# Patient Record
Sex: FEMALE | NOT HISPANIC OR LATINO
[De-identification: names, ages, dates, MRNs, and addresses within clinical notes are randomized per-mention and may not be internally consistent; named-entity substitution may affect disease eponyms.]

---

## 2017-03-21 ENCOUNTER — APPOINTMENT (OUTPATIENT)
Dept: ULTRASOUND IMAGING | Facility: CLINIC | Age: 82
End: 2017-03-21

## 2017-03-21 ENCOUNTER — OUTPATIENT (OUTPATIENT)
Dept: OUTPATIENT SERVICES | Facility: HOSPITAL | Age: 82
LOS: 1 days | End: 2017-03-21
Payer: MEDICARE

## 2017-03-21 DIAGNOSIS — Z00.8 ENCOUNTER FOR OTHER GENERAL EXAMINATION: ICD-10-CM

## 2017-03-21 PROBLEM — Z00.00 ENCOUNTER FOR PREVENTIVE HEALTH EXAMINATION: Status: ACTIVE | Noted: 2017-03-21

## 2017-03-21 PROCEDURE — 76700 US EXAM ABDOM COMPLETE: CPT

## 2017-03-21 PROCEDURE — 76700 US EXAM ABDOM COMPLETE: CPT | Mod: 26

## 2017-09-08 ENCOUNTER — APPOINTMENT (OUTPATIENT)
Dept: ORTHOPEDIC SURGERY | Facility: CLINIC | Age: 82
End: 2017-09-08
Payer: MEDICARE

## 2017-09-08 VITALS
HEART RATE: 56 BPM | DIASTOLIC BLOOD PRESSURE: 74 MMHG | SYSTOLIC BLOOD PRESSURE: 205 MMHG | BODY MASS INDEX: 28.52 KG/M2 | WEIGHT: 155 LBS | HEIGHT: 62 IN | TEMPERATURE: 97.5 F

## 2017-09-08 DIAGNOSIS — Z56.0 UNEMPLOYMENT, UNSPECIFIED: ICD-10-CM

## 2017-09-08 DIAGNOSIS — Z86.39 PERSONAL HISTORY OF OTHER ENDOCRINE, NUTRITIONAL AND METABOLIC DISEASE: ICD-10-CM

## 2017-09-08 DIAGNOSIS — Z72.3 LACK OF PHYSICAL EXERCISE: ICD-10-CM

## 2017-09-08 DIAGNOSIS — Z78.9 OTHER SPECIFIED HEALTH STATUS: ICD-10-CM

## 2017-09-08 PROCEDURE — 20550 NJX 1 TENDON SHEATH/LIGAMENT: CPT | Mod: 50

## 2017-09-08 PROCEDURE — 99203 OFFICE O/P NEW LOW 30 MIN: CPT | Mod: 25

## 2017-09-08 SDOH — ECONOMIC STABILITY - INCOME SECURITY: UNEMPLOYMENT, UNSPECIFIED: Z56.0

## 2017-10-05 ENCOUNTER — APPOINTMENT (OUTPATIENT)
Dept: ORTHOPEDIC SURGERY | Facility: CLINIC | Age: 82
End: 2017-10-05

## 2017-10-12 ENCOUNTER — APPOINTMENT (OUTPATIENT)
Dept: ULTRASOUND IMAGING | Facility: CLINIC | Age: 82
End: 2017-10-12

## 2017-10-16 ENCOUNTER — OUTPATIENT (OUTPATIENT)
Dept: OUTPATIENT SERVICES | Facility: HOSPITAL | Age: 82
LOS: 1 days | End: 2017-10-16
Payer: COMMERCIAL

## 2017-10-16 ENCOUNTER — APPOINTMENT (OUTPATIENT)
Dept: ULTRASOUND IMAGING | Facility: CLINIC | Age: 82
End: 2017-10-16
Payer: MEDICARE

## 2017-10-16 DIAGNOSIS — N18.3 CHRONIC KIDNEY DISEASE, STAGE 3 (MODERATE): ICD-10-CM

## 2017-10-16 PROCEDURE — 93975 VASCULAR STUDY: CPT | Mod: 26

## 2017-10-16 PROCEDURE — 93975 VASCULAR STUDY: CPT

## 2017-12-01 ENCOUNTER — OUTPATIENT (OUTPATIENT)
Dept: OUTPATIENT SERVICES | Facility: HOSPITAL | Age: 82
LOS: 1 days | End: 2017-12-01
Payer: COMMERCIAL

## 2017-12-01 ENCOUNTER — EMERGENCY (EMERGENCY)
Facility: HOSPITAL | Age: 82
LOS: 1 days | Discharge: HOME | End: 2017-12-01
Attending: EMERGENCY MEDICINE
Payer: COMMERCIAL

## 2017-12-01 VITALS
RESPIRATION RATE: 20 BRPM | SYSTOLIC BLOOD PRESSURE: 217 MMHG | TEMPERATURE: 98 F | DIASTOLIC BLOOD PRESSURE: 77 MMHG | HEART RATE: 61 BPM | HEIGHT: 61 IN | WEIGHT: 139.99 LBS

## 2017-12-01 LAB
ALBUMIN SERPL ELPH-MCNC: 4.2 G/DL — SIGNIFICANT CHANGE UP (ref 3.3–5.2)
ALP SERPL-CCNC: 100 U/L — SIGNIFICANT CHANGE UP (ref 40–120)
ALT FLD-CCNC: 14 U/L — SIGNIFICANT CHANGE UP
ANION GAP SERPL CALC-SCNC: 16 MMOL/L — SIGNIFICANT CHANGE UP (ref 5–17)
APTT BLD: 32.4 SEC — SIGNIFICANT CHANGE UP (ref 27.5–37.4)
AST SERPL-CCNC: 13 U/L — SIGNIFICANT CHANGE UP
BASOPHILS # BLD AUTO: 0 K/UL — SIGNIFICANT CHANGE UP (ref 0–0.2)
BASOPHILS NFR BLD AUTO: 0.1 % — SIGNIFICANT CHANGE UP (ref 0–2)
BILIRUB SERPL-MCNC: 0.3 MG/DL — LOW (ref 0.4–2)
BUN SERPL-MCNC: 39 MG/DL — HIGH (ref 8–20)
CALCIUM SERPL-MCNC: 9.6 MG/DL — SIGNIFICANT CHANGE UP (ref 8.6–10.2)
CHLORIDE SERPL-SCNC: 101 MMOL/L — SIGNIFICANT CHANGE UP (ref 98–107)
CK SERPL-CCNC: 97 U/L — SIGNIFICANT CHANGE UP (ref 25–170)
CO2 SERPL-SCNC: 27 MMOL/L — SIGNIFICANT CHANGE UP (ref 22–29)
CREAT SERPL-MCNC: 2.46 MG/DL — HIGH (ref 0.5–1.3)
EOSINOPHIL # BLD AUTO: 0.3 K/UL — SIGNIFICANT CHANGE UP (ref 0–0.5)
EOSINOPHIL NFR BLD AUTO: 3.6 % — SIGNIFICANT CHANGE UP (ref 0–6)
GLUCOSE SERPL-MCNC: 223 MG/DL — HIGH (ref 70–115)
HCT VFR BLD CALC: 34.1 % — LOW (ref 37–47)
HGB BLD-MCNC: 11.1 G/DL — LOW (ref 12–16)
INR BLD: 0.9 RATIO — SIGNIFICANT CHANGE UP (ref 0.88–1.16)
LYMPHOCYTES # BLD AUTO: 2.1 K/UL — SIGNIFICANT CHANGE UP (ref 1–4.8)
LYMPHOCYTES # BLD AUTO: 22.6 % — SIGNIFICANT CHANGE UP (ref 20–55)
MCHC RBC-ENTMCNC: 29.3 PG — SIGNIFICANT CHANGE UP (ref 27–31)
MCHC RBC-ENTMCNC: 32.6 G/DL — SIGNIFICANT CHANGE UP (ref 32–36)
MCV RBC AUTO: 90 FL — SIGNIFICANT CHANGE UP (ref 81–99)
MONOCYTES # BLD AUTO: 0.7 K/UL — SIGNIFICANT CHANGE UP (ref 0–0.8)
MONOCYTES NFR BLD AUTO: 7.5 % — SIGNIFICANT CHANGE UP (ref 3–10)
NEUTROPHILS # BLD AUTO: 6.1 K/UL — SIGNIFICANT CHANGE UP (ref 1.8–8)
NEUTROPHILS NFR BLD AUTO: 66 % — SIGNIFICANT CHANGE UP (ref 37–73)
PLATELET # BLD AUTO: 256 K/UL — SIGNIFICANT CHANGE UP (ref 150–400)
POTASSIUM SERPL-MCNC: 4.4 MMOL/L — SIGNIFICANT CHANGE UP (ref 3.5–5.3)
POTASSIUM SERPL-SCNC: 4.4 MMOL/L — SIGNIFICANT CHANGE UP (ref 3.5–5.3)
PROT SERPL-MCNC: 7.6 G/DL — SIGNIFICANT CHANGE UP (ref 6.6–8.7)
PROTHROM AB SERPL-ACNC: 9.9 SEC — SIGNIFICANT CHANGE UP (ref 9.8–12.7)
RBC # BLD: 3.79 M/UL — LOW (ref 4.4–5.2)
RBC # FLD: 13.3 % — SIGNIFICANT CHANGE UP (ref 11–15.6)
SODIUM SERPL-SCNC: 144 MMOL/L — SIGNIFICANT CHANGE UP (ref 135–145)
TROPONIN T SERPL-MCNC: <0.01 NG/ML — SIGNIFICANT CHANGE UP (ref 0–0.06)
WBC # BLD: 9.2 K/UL — SIGNIFICANT CHANGE UP (ref 4.8–10.8)
WBC # FLD AUTO: 9.2 K/UL — SIGNIFICANT CHANGE UP (ref 4.8–10.8)

## 2017-12-01 PROCEDURE — 71045 X-RAY EXAM CHEST 1 VIEW: CPT

## 2017-12-01 PROCEDURE — 36415 COLL VENOUS BLD VENIPUNCTURE: CPT

## 2017-12-01 PROCEDURE — T1013: CPT

## 2017-12-01 PROCEDURE — 85027 COMPLETE CBC AUTOMATED: CPT

## 2017-12-01 PROCEDURE — 70450 CT HEAD/BRAIN W/O DYE: CPT

## 2017-12-01 PROCEDURE — 84484 ASSAY OF TROPONIN QUANT: CPT

## 2017-12-01 PROCEDURE — 80053 COMPREHEN METABOLIC PANEL: CPT

## 2017-12-01 PROCEDURE — 82550 ASSAY OF CK (CPK): CPT

## 2017-12-01 PROCEDURE — G9001: CPT

## 2017-12-01 PROCEDURE — 85610 PROTHROMBIN TIME: CPT

## 2017-12-01 PROCEDURE — 96374 THER/PROPH/DIAG INJ IV PUSH: CPT

## 2017-12-01 PROCEDURE — 93005 ELECTROCARDIOGRAM TRACING: CPT

## 2017-12-01 PROCEDURE — 71010: CPT | Mod: 26

## 2017-12-01 PROCEDURE — 99284 EMERGENCY DEPT VISIT MOD MDM: CPT | Mod: 25

## 2017-12-01 PROCEDURE — 99285 EMERGENCY DEPT VISIT HI MDM: CPT

## 2017-12-01 PROCEDURE — 93010 ELECTROCARDIOGRAM REPORT: CPT

## 2017-12-01 PROCEDURE — 85730 THROMBOPLASTIN TIME PARTIAL: CPT

## 2017-12-01 PROCEDURE — 70450 CT HEAD/BRAIN W/O DYE: CPT | Mod: 26

## 2017-12-01 RX ORDER — HYDRALAZINE HCL 50 MG
5 TABLET ORAL ONCE
Qty: 0 | Refills: 0 | Status: COMPLETED | OUTPATIENT
Start: 2017-12-01 | End: 2017-12-01

## 2017-12-01 RX ORDER — SODIUM CHLORIDE 9 MG/ML
3 INJECTION INTRAMUSCULAR; INTRAVENOUS; SUBCUTANEOUS ONCE
Qty: 0 | Refills: 0 | Status: COMPLETED | OUTPATIENT
Start: 2017-12-01 | End: 2017-12-01

## 2017-12-01 RX ADMIN — Medication 5 MILLIGRAM(S): at 19:59

## 2017-12-01 RX ADMIN — SODIUM CHLORIDE 3 MILLILITER(S): 9 INJECTION INTRAMUSCULAR; INTRAVENOUS; SUBCUTANEOUS at 19:54

## 2017-12-01 RX ADMIN — Medication 0.1 MILLIGRAM(S): at 21:05

## 2017-12-01 NOTE — ED PROVIDER NOTE - OBJECTIVE STATEMENT
80 y/o F pt with a PMHx of HTN, cardiomegaly, cataract surgery presents to the ED c/o dizziness, generalized weakness, headache, palpitations with HTN over the last few days. Pt called her PMD who told her to come the ED. Reports a HTN at 170/unknown while at home. Non smoker, non drinker. Denies syncope, n/v/d, chest pain, fatigue, sob, blurred vision, fever, chills, back pain, rash, urinary symptoms or any other complaints. NKDA.  PMD: Dr. Alena Cornejo.  Cardiologist: [facility]  Nephrologist: Dr. Sandoval.

## 2017-12-01 NOTE — ED PROVIDER NOTE - ENMT, MLM
Airway patent, Nasal mucosa clear. Moist mm. Throat has no vesicles, no oropharyngeal exudates and uvula is midline.

## 2017-12-01 NOTE — ED ADULT NURSE NOTE - OBJECTIVE STATEMENT
Pt c/o htn, headache, dizziness, blurred vision and palpitations x 2 days.  Pt has hx of htn and has been compliant w meds.  Denies cp, sob. Pt was sent in by PMD.  Pt placed on cardiac monitor showing normal sinus.  No acute distress noted.

## 2017-12-01 NOTE — ED ADULT TRIAGE NOTE - CHIEF COMPLAINT QUOTE
Patient reports feeling dizzy for several days with elevated blood pressure and headache.  Dr. Alena Cornejo advised patient to come to ED.

## 2017-12-01 NOTE — ED PROVIDER NOTE - CARE PLAN
Principal Discharge DX:	HTN (hypertension)  Secondary Diagnosis:	Dizziness  Secondary Diagnosis:	Palpitations

## 2017-12-01 NOTE — ED PROVIDER NOTE - PROGRESS NOTE DETAILS
Labs, CT results as noted.  Repeat BP as noted after meds.  Pt feels improved with meds and is now ambulatory in ED with steady gait and is stable for d/c

## 2017-12-01 NOTE — ED PROVIDER NOTE - CARDIAC, MLM
Normal rate, regular rhythm.  Heart sounds S1, S2.  No murmurs, rubs or gallops. No peripheral edema.

## 2017-12-02 VITALS
TEMPERATURE: 98 F | OXYGEN SATURATION: 97 % | HEART RATE: 55 BPM | SYSTOLIC BLOOD PRESSURE: 160 MMHG | DIASTOLIC BLOOD PRESSURE: 71 MMHG | RESPIRATION RATE: 18 BRPM

## 2017-12-02 DIAGNOSIS — Z86.69 PERSONAL HISTORY OF OTHER DISEASES OF THE NERVOUS SYSTEM AND SENSE ORGANS: Chronic | ICD-10-CM

## 2017-12-05 DIAGNOSIS — R69 ILLNESS, UNSPECIFIED: ICD-10-CM

## 2018-04-01 ENCOUNTER — OUTPATIENT (OUTPATIENT)
Dept: OUTPATIENT SERVICES | Facility: HOSPITAL | Age: 83
LOS: 1 days | End: 2018-04-01
Payer: MEDICAID

## 2018-04-01 DIAGNOSIS — Z86.69 PERSONAL HISTORY OF OTHER DISEASES OF THE NERVOUS SYSTEM AND SENSE ORGANS: Chronic | ICD-10-CM

## 2018-04-01 PROCEDURE — G9001: CPT

## 2018-04-06 DIAGNOSIS — R69 ILLNESS, UNSPECIFIED: ICD-10-CM

## 2018-07-03 ENCOUNTER — APPOINTMENT (OUTPATIENT)
Dept: ORTHOPEDIC SURGERY | Facility: CLINIC | Age: 83
End: 2018-07-03
Payer: MEDICARE

## 2018-07-03 VITALS
SYSTOLIC BLOOD PRESSURE: 91 MMHG | HEIGHT: 62 IN | WEIGHT: 155 LBS | BODY MASS INDEX: 28.52 KG/M2 | HEART RATE: 46 BPM | DIASTOLIC BLOOD PRESSURE: 51 MMHG

## 2018-07-03 PROCEDURE — 20550 NJX 1 TENDON SHEATH/LIGAMENT: CPT | Mod: RT

## 2018-07-03 PROCEDURE — 99215 OFFICE O/P EST HI 40 MIN: CPT | Mod: 25

## 2019-06-24 PROBLEM — E11.9 TYPE 2 DIABETES MELLITUS WITHOUT COMPLICATIONS: Chronic | Status: ACTIVE | Noted: 2017-12-02

## 2019-06-24 PROBLEM — E78.00 PURE HYPERCHOLESTEROLEMIA, UNSPECIFIED: Chronic | Status: ACTIVE | Noted: 2017-12-02

## 2019-06-24 PROBLEM — I10 ESSENTIAL (PRIMARY) HYPERTENSION: Chronic | Status: ACTIVE | Noted: 2017-12-02

## 2019-06-24 PROBLEM — I51.7 CARDIOMEGALY: Chronic | Status: ACTIVE | Noted: 2017-12-02

## 2019-06-26 ENCOUNTER — APPOINTMENT (OUTPATIENT)
Dept: FAMILY MEDICINE | Facility: CLINIC | Age: 84
End: 2019-06-26
Payer: MEDICARE

## 2019-06-26 VITALS
HEART RATE: 61 BPM | BODY MASS INDEX: 26.68 KG/M2 | DIASTOLIC BLOOD PRESSURE: 65 MMHG | WEIGHT: 145 LBS | HEIGHT: 62 IN | OXYGEN SATURATION: 100 % | TEMPERATURE: 97.9 F | SYSTOLIC BLOOD PRESSURE: 128 MMHG

## 2019-06-26 DIAGNOSIS — Z95.828 PRESENCE OF OTHER VASCULAR IMPLANTS AND GRAFTS: ICD-10-CM

## 2019-06-26 DIAGNOSIS — M65.331 TRIGGER FINGER, RIGHT MIDDLE FINGER: ICD-10-CM

## 2019-06-26 DIAGNOSIS — M65.311 TRIGGER THUMB, RIGHT THUMB: ICD-10-CM

## 2019-06-26 DIAGNOSIS — M65.332 TRIGGER FINGER, LEFT MIDDLE FINGER: ICD-10-CM

## 2019-06-26 PROCEDURE — 36415 COLL VENOUS BLD VENIPUNCTURE: CPT

## 2019-06-26 PROCEDURE — 99204 OFFICE O/P NEW MOD 45 MIN: CPT | Mod: 25

## 2019-06-26 PROCEDURE — 83036 HEMOGLOBIN GLYCOSYLATED A1C: CPT | Mod: QW

## 2019-06-26 RX ORDER — ALLOPURINOL 100 MG/1
100 TABLET ORAL
Qty: 90 | Refills: 0 | Status: DISCONTINUED | COMMUNITY
Start: 2017-07-03 | End: 2019-06-26

## 2019-06-26 RX ORDER — DOXAZOSIN 4 MG/1
4 TABLET ORAL
Qty: 90 | Refills: 0 | Status: DISCONTINUED | COMMUNITY
Start: 2017-07-03 | End: 2019-06-26

## 2019-06-26 RX ORDER — GLIPIZIDE 5 MG/1
5 TABLET ORAL
Qty: 30 | Refills: 0 | Status: DISCONTINUED | COMMUNITY
Start: 2016-12-12 | End: 2019-06-26

## 2019-06-26 RX ORDER — FUROSEMIDE 40 MG/1
40 TABLET ORAL
Qty: 90 | Refills: 0 | Status: DISCONTINUED | COMMUNITY
Start: 2017-07-03 | End: 2019-06-26

## 2019-06-26 RX ORDER — TRIAMCINOLONE ACETONIDE 1 MG/G
0.1 CREAM TOPICAL
Qty: 80 | Refills: 0 | Status: DISCONTINUED | COMMUNITY
Start: 2017-08-18 | End: 2019-06-26

## 2019-06-26 RX ORDER — METOCLOPRAMIDE 5 MG/1
5 TABLET ORAL
Qty: 30 | Refills: 0 | Status: DISCONTINUED | COMMUNITY
Start: 2017-04-17 | End: 2019-06-26

## 2019-06-26 RX ORDER — HYDRALAZINE HYDROCHLORIDE 10 MG/1
10 TABLET ORAL
Qty: 270 | Refills: 0 | Status: DISCONTINUED | COMMUNITY
Start: 2017-07-22 | End: 2019-06-26

## 2019-06-26 RX ORDER — LORATADINE 10 MG/1
10 TABLET ORAL
Qty: 30 | Refills: 0 | Status: DISCONTINUED | COMMUNITY
Start: 2017-05-09 | End: 2019-06-26

## 2019-06-26 RX ORDER — CIPROFLOXACIN HYDROCHLORIDE 250 MG/1
250 TABLET, FILM COATED ORAL
Qty: 20 | Refills: 0 | Status: DISCONTINUED | COMMUNITY
Start: 2017-07-03 | End: 2019-06-26

## 2019-06-26 RX ORDER — TERCONAZOLE 4 MG/G
0.4 CREAM VAGINAL
Qty: 45 | Refills: 0 | Status: DISCONTINUED | COMMUNITY
Start: 2017-06-01 | End: 2019-06-26

## 2019-06-26 RX ORDER — CICLOPIROX 80 MG/ML
8 SOLUTION TOPICAL
Qty: 7 | Refills: 0 | Status: DISCONTINUED | COMMUNITY
Start: 2017-05-09 | End: 2019-06-26

## 2019-06-26 RX ORDER — METFORMIN HYDROCHLORIDE 1000 MG/1
1000 TABLET, COATED ORAL
Qty: 180 | Refills: 0 | Status: DISCONTINUED | COMMUNITY
Start: 2017-07-03 | End: 2019-06-26

## 2019-06-26 RX ORDER — SULFAMETHOXAZOLE AND TRIMETHOPRIM 800; 160 MG/1; MG/1
800-160 TABLET ORAL
Qty: 14 | Refills: 0 | Status: DISCONTINUED | COMMUNITY
Start: 2017-06-01 | End: 2019-06-26

## 2019-06-26 NOTE — PAST MEDICAL HISTORY
[Menopause Age____] : age at menopause was [unfilled] [Menarche Age ____] : age at menarche was [unfilled] [Total Preg ___] : G[unfilled] [Live Births ___] : P[unfilled]  [Living ___] : Living: [unfilled] [Full Term ___] : Full Term: [unfilled]

## 2019-06-26 NOTE — PAST MEDICAL HISTORY
[Menopause Age____] : age at menopause was [unfilled] [Menarche Age ____] : age at menarche was [unfilled] [Total Preg ___] : G[unfilled] [Full Term ___] : Full Term: [unfilled] [Living ___] : Living: [unfilled] [Live Births ___] : P[unfilled]

## 2019-06-26 NOTE — HEALTH RISK ASSESSMENT
[Intercurrent hospitalizations] : was admitted to the hospital  [No] : In the past 12 months have you used drugs other than those required for medical reasons? No [No falls in past year] : Patient reported no falls in the past year [0] : 1) Little interest or pleasure doing things: Not at all (0) [3] : 2) Feeling down, depressed, or hopeless for nearly every day (3) [Good] : ~his/her~  mood as  good [Patient reported colonoscopy was normal] : Patient reported colonoscopy was normal [HIV test declined] : HIV test declined [Hepatitis C test offered] : Hepatitis C test offered [Unemployed] : unemployed [With Family] : lives with family [None] : None [# Of Children ___] : has [unfilled] children [] :  [Fully functional (bathing, dressing, toileting, transferring, walking, feeding)] : Fully functional (bathing, dressing, toileting, transferring, walking, feeding) [Fully functional (using the telephone, shopping, preparing meals, housekeeping, doing laundry, using] : Fully functional and needs no help or supervision to perform IADLs (using the telephone, shopping, preparing meals, housekeeping, doing laundry, using transportation, managing medications and managing finances) [Smoke Detector] : smoke detector [Reports normal functional visual acuity (ie: able to read med bottle)] : Reports normal functional visual acuity [Carbon Monoxide Detector] : carbon monoxide detector [Travel to Developing Areas] : travel to developing areas [Seat Belt] :  uses seat belt [Patient/Caregiver not ready to engage] : Patient/Caregiver not ready to engage [] : No [de-identified] : 2018- stent placement in leg- as per patient  [de-identified] : none [IFP9Pytpr] : 9 [RYY1Hagkk] : 3 [de-identified] : regular  [Language] : denies difficulty with language [Change in mental status noted] : No change in mental status noted [Behavior] : denies difficulty with behavior [Learning/Retaining New Information] : denies difficulty learning/retaining new information [Handling Complex Tasks] : denies difficulty handling complex tasks [Reasoning] : denies difficulty with reasoning [Spatial Ability and Orientation] : denies difficulty with spatial ability and orientation [Sexually Active] : not sexually active [High Risk Behavior] : no high risk behavior [Reports changes in hearing] : Reports no changes in hearing [Reports changes in vision] : Reports no changes in vision [Reports changes in dental health] : Reports no changes in dental health [Guns at Home] : no guns at home [Sunscreen] : does not use sunscreen [TB Exposure] : is not being exposed to tuberculosis [BoneDensityComments] : Unknown [ColonoscopyDate] : 01/2014 [ColonoscopyComments] : as per patient - done in [place] [AdvancecareDate] : 06/19

## 2019-06-26 NOTE — HEALTH RISK ASSESSMENT
[Intercurrent hospitalizations] : was admitted to the hospital  [No] : In the past 12 months have you used drugs other than those required for medical reasons? No [No falls in past year] : Patient reported no falls in the past year [0] : 1) Little interest or pleasure doing things: Not at all (0) [3] : 2) Feeling down, depressed, or hopeless for nearly every day (3) [Good] : ~his/her~ current health as good [Patient reported colonoscopy was normal] : Patient reported colonoscopy was normal [Hepatitis C test offered] : Hepatitis C test offered [HIV test declined] : HIV test declined [None] : None [With Family] : lives with family [Unemployed] : unemployed [# Of Children ___] : has [unfilled] children [] :  [Fully functional (bathing, dressing, toileting, transferring, walking, feeding)] : Fully functional (bathing, dressing, toileting, transferring, walking, feeding) [Fully functional (using the telephone, shopping, preparing meals, housekeeping, doing laundry, using] : Fully functional and needs no help or supervision to perform IADLs (using the telephone, shopping, preparing meals, housekeeping, doing laundry, using transportation, managing medications and managing finances) [Reports normal functional visual acuity (ie: able to read med bottle)] : Reports normal functional visual acuity [Smoke Detector] : smoke detector [Carbon Monoxide Detector] : carbon monoxide detector [Seat Belt] :  uses seat belt [Travel to Developing Areas] : travel to developing areas [Patient/Caregiver not ready to engage] : Patient/Caregiver not ready to engage [] : No [de-identified] : 2018- stent placement in leg- as per patient  [de-identified] : none [THO6Yvwxk] : 9 [de-identified] : regular  [DWS2Vxveo] : 3 [Change in mental status noted] : No change in mental status noted [Language] : denies difficulty with language [Behavior] : denies difficulty with behavior [Learning/Retaining New Information] : denies difficulty learning/retaining new information [Handling Complex Tasks] : denies difficulty handling complex tasks [Reasoning] : denies difficulty with reasoning [Spatial Ability and Orientation] : denies difficulty with spatial ability and orientation [Sexually Active] : not sexually active [High Risk Behavior] : no high risk behavior [Reports changes in hearing] : Reports no changes in hearing [Reports changes in vision] : Reports no changes in vision [Reports changes in dental health] : Reports no changes in dental health [Guns at Home] : no guns at home [Sunscreen] : does not use sunscreen [TB Exposure] : is not being exposed to tuberculosis [BoneDensityComments] : Unknown [ColonoscopyDate] : 01/2014 [ColonoscopyComments] : as per patient - done in [place] [AdvancecareDate] : 06/19

## 2019-06-26 NOTE — ASSESSMENT
[FreeTextEntry1] : 83 y.o female with PMHx significant for HTN, HLD, DM2, diabetic neuropathy, PAD s/p Right LE Stent (?), presenting to establish as a new patient in the practice and c/o bilateral LE pain and numbness.\par \par ENDOCRINE/NEUROLOGY: h/o T2DM, diabetic neuropathy.\par -HgA1c in house 7.5\par -Currently on Glipizide and Januvia\par -Recommend fasting BS <130.\par -Moderate exercise recommended\par -Dietary changes discussed.\par -Change Gabapentin 100 mg TID to 200 mg at bedtime as pt reports daytime somnolence.\par -Prescription for MetanX sent to pharmacy, unknown wether will be covered by insurance.\par -Reports last Diabetic eye exam recently, will request records.\par \par [facility]: h/o HTN, HLD, PAD s/p stent to RLE\par -Cardiology - [facility], will request records\par -Will review records for name of Vascular surgeon.\par -Blood pressure well controlled on current regimen\par -Continue Carvedilol, Clonidine, Hydralazine, Losartan , Simvastatin, Clopidogrel.\par -Moderate exercise recommended.\par \par GI PPx- Continue Famotidine\par \par RENAL: ? CKD\par -Following with Dr Sidhu\par -Will check CMP today.\par \par HCM:\par -Will obtain CBC, CMP, Vitamin D, TSH, Urine for microalbumin.\par -Will request records from previous PMD RE: immunizations\par -Will request records from Nephrology, Cardiology, Ophthalmology.\par -RTO upon returning from [place] in 2 months for follow up

## 2019-06-26 NOTE — PHYSICAL EXAM
[No Acute Distress] : no acute distress [Well Nourished] : well nourished [No JVD] : no jugular venous distention [Well Developed] : well developed [Well-Appearing] : well-appearing [Supple] : supple [No Lymphadenopathy] : no lymphadenopathy [No Respiratory Distress] : no respiratory distress  [No Accessory Muscle Use] : no accessory muscle use [Clear to Auscultation] : lungs were clear to auscultation bilaterally [Normal Rate] : normal rate  [Regular Rhythm] : with a regular rhythm [Normal S1, S2] : normal S1 and S2 [No Murmur] : no murmur heard [No Edema] : there was no peripheral edema [Soft] : abdomen soft [Non Tender] : non-tender [Normal Gait] : normal gait [No Rash] : no rash [Normal Bowel Sounds] : normal bowel sounds [Normal Affect] : the affect was normal [Coordination Grossly Intact] : coordination grossly intact [No Focal Deficits] : no focal deficits [Normal Insight/Judgement] : insight and judgment were intact

## 2019-06-26 NOTE — COUNSELING
[Weight management counseling provided] : Weight management [Activity counseling provided] : activity [Healthy eating counseling provided] : healthy eating [Good understanding] : Patient has a good understanding of disease, goals and obesity follow-up plan [None] : None

## 2019-06-26 NOTE — REVIEW OF SYSTEMS
[Joint Pain] : joint pain [Muscle Pain] : muscle pain [Muscle Weakness] : muscle weakness [Headache] : headache [Depression] : depression [Negative] : Integumentary [de-identified] : depression related to her brother's death

## 2019-06-26 NOTE — ASSESSMENT
[FreeTextEntry1] : 83 y.o female with PMHx significant for HTN, HLD, DM2, diabetic neuropathy, PAD s/p Right LE Stent (?), presenting to establish as a new patient in the practice and c/o bilateral LE pain and numbness.\par \par ENDOCRINE/NEUROLOGY: h/o T2DM, diabetic neuropathy.\par -HgA1c in house 7.5\par -Currently on Glipizide and Januvia\par -Recommend fasting BS <130.\par -Moderate exercise recommended\par -Dietary changes discussed.\par -Change Gabapentin 100 mg TID to 200 mg at bedtime as pt reports daytime somnolence.\par -Prescription for MetanX sent to pharmacy, unknown wether will be covered by insurance.\par -Reports last Diabetic eye exam recently, will request records.\par \par [facility]: h/o HTN, HLD, PAD s/p stent to RLE\par -Cardiology - [facility], will request records\par -Will review records for name of Vascular surgeon.\par -Blood pressure well controlled on current regimen\par -Continue Carvedilol, Clonidine, Hydralazine, Losartan , Simvastatin, Clopidogrel.\par -Moderate exercise recommended.\par \par GI PPx- Continue Famotidine\par \par RENAL: ? CKD\par -Following with Dr Sidhu\par -Will check CMP today.\par \par HCM:\par -Will obtain CBC, CMP, Vitamin D, TSH, Urine for microalbumin.\par -Will request records from previous PMD RE: immunizations\par -Will request records from Nephrology, Cardiology, Ophthalmology.\par -RTO upon returning from [place] in 2 months for follow up

## 2019-06-26 NOTE — HISTORY OF PRESENT ILLNESS
[Family Member] : family member [FreeTextEntry8] : establish care with new PCP .\par \par 83 y.o female with PMHx significant for HTN, HLD, DM2, diabetic neuropathy, presenting to establish as a new patient in the practice and complains of pain bilaterally in LE's, muscle pain in thighs, ankle pain, and burning sensation on plantar of feet, when at rest feels paresthesia as "pins and needles". Also feels pain in muscle of arm with some weakness as per patient interferes with ADL's rates pain at 10/10.  Onset about 3 months ago and gradually getting worse, takes aspirin for pain. \par \par Previous PMD Dr Alena Cornejo

## 2019-06-26 NOTE — PHYSICAL EXAM
[Well Nourished] : well nourished [No Acute Distress] : no acute distress [Well-Appearing] : well-appearing [No JVD] : no jugular venous distention [Well Developed] : well developed [Supple] : supple [No Lymphadenopathy] : no lymphadenopathy [No Respiratory Distress] : no respiratory distress  [Clear to Auscultation] : lungs were clear to auscultation bilaterally [No Accessory Muscle Use] : no accessory muscle use [Normal Rate] : normal rate  [Regular Rhythm] : with a regular rhythm [No Murmur] : no murmur heard [Normal S1, S2] : normal S1 and S2 [Soft] : abdomen soft [No Edema] : there was no peripheral edema [Non Tender] : non-tender [No Rash] : no rash [Normal Bowel Sounds] : normal bowel sounds [Normal Gait] : normal gait [Coordination Grossly Intact] : coordination grossly intact [No Focal Deficits] : no focal deficits [Normal Affect] : the affect was normal [Normal Insight/Judgement] : insight and judgment were intact

## 2019-06-26 NOTE — REVIEW OF SYSTEMS
[Joint Pain] : joint pain [Muscle Weakness] : muscle weakness [Muscle Pain] : muscle pain [Headache] : headache [Depression] : depression [Negative] : Heme/Lymph [de-identified] : depression related to her brother's death

## 2019-06-27 LAB
25(OH)D3 SERPL-MCNC: 22.9 NG/ML
ALBUMIN SERPL ELPH-MCNC: 4.3 G/DL
ALP BLD-CCNC: 81 U/L
ALT SERPL-CCNC: 13 U/L
ANION GAP SERPL CALC-SCNC: 13 MMOL/L
AST SERPL-CCNC: 7 U/L
BASOPHILS # BLD AUTO: 0.02 K/UL
BASOPHILS NFR BLD AUTO: 0.2 %
BILIRUB SERPL-MCNC: 0.3 MG/DL
BUN SERPL-MCNC: 62 MG/DL
CALCIUM SERPL-MCNC: 9 MG/DL
CHLORIDE SERPL-SCNC: 100 MMOL/L
CO2 SERPL-SCNC: 24 MMOL/L
CREAT SERPL-MCNC: 3.66 MG/DL
CREAT SPEC-SCNC: 56 MG/DL
EOSINOPHIL # BLD AUTO: 0.24 K/UL
EOSINOPHIL NFR BLD AUTO: 2.9 %
GLUCOSE BLDC GLUCOMTR-MCNC: 330
GLUCOSE SERPL-MCNC: 281 MG/DL
HBA1C MFR BLD HPLC: 7.4
HCT VFR BLD CALC: 30.9 %
HGB BLD-MCNC: 9.2 G/DL
IMM GRANULOCYTES NFR BLD AUTO: 0.4 %
LYMPHOCYTES # BLD AUTO: 1.17 K/UL
LYMPHOCYTES NFR BLD AUTO: 14.2 %
MAN DIFF?: NORMAL
MCHC RBC-ENTMCNC: 27.8 PG
MCHC RBC-ENTMCNC: 29.8 GM/DL
MCV RBC AUTO: 93.4 FL
MICROALBUMIN 24H UR DL<=1MG/L-MCNC: 1.2 MG/DL
MICROALBUMIN/CREAT 24H UR-RTO: 21 MG/G
MONOCYTES # BLD AUTO: 0.59 K/UL
MONOCYTES NFR BLD AUTO: 7.2 %
NEUTROPHILS # BLD AUTO: 6.2 K/UL
NEUTROPHILS NFR BLD AUTO: 75.1 %
PLATELET # BLD AUTO: 272 K/UL
POTASSIUM SERPL-SCNC: 5.5 MMOL/L
PROT SERPL-MCNC: 6.5 G/DL
RBC # BLD: 3.31 M/UL
RBC # FLD: 14 %
SODIUM SERPL-SCNC: 137 MMOL/L
TSH SERPL-ACNC: 2.52 UIU/ML
WBC # FLD AUTO: 8.25 K/UL

## 2019-07-10 ENCOUNTER — FORM ENCOUNTER (OUTPATIENT)
Age: 84
End: 2019-07-10

## 2019-07-11 ENCOUNTER — APPOINTMENT (OUTPATIENT)
Dept: ULTRASOUND IMAGING | Facility: CLINIC | Age: 84
End: 2019-07-11
Payer: MEDICARE

## 2019-07-11 ENCOUNTER — OUTPATIENT (OUTPATIENT)
Dept: OUTPATIENT SERVICES | Facility: HOSPITAL | Age: 84
LOS: 1 days | End: 2019-07-11
Payer: MEDICARE

## 2019-07-11 DIAGNOSIS — N18.5 CHRONIC KIDNEY DISEASE, STAGE 5: ICD-10-CM

## 2019-07-11 DIAGNOSIS — Z86.69 PERSONAL HISTORY OF OTHER DISEASES OF THE NERVOUS SYSTEM AND SENSE ORGANS: Chronic | ICD-10-CM

## 2019-07-11 PROCEDURE — 76775 US EXAM ABDO BACK WALL LIM: CPT

## 2019-07-11 PROCEDURE — 76775 US EXAM ABDO BACK WALL LIM: CPT | Mod: 26

## 2019-07-11 PROCEDURE — 76770 US EXAM ABDO BACK WALL COMP: CPT

## 2019-07-15 ENCOUNTER — APPOINTMENT (OUTPATIENT)
Dept: FAMILY MEDICINE | Facility: CLINIC | Age: 84
End: 2019-07-15
Payer: MEDICARE

## 2019-07-15 VITALS
WEIGHT: 146 LBS | HEIGHT: 62 IN | OXYGEN SATURATION: 97 % | HEART RATE: 66 BPM | SYSTOLIC BLOOD PRESSURE: 114 MMHG | BODY MASS INDEX: 26.87 KG/M2 | TEMPERATURE: 98 F | DIASTOLIC BLOOD PRESSURE: 63 MMHG

## 2019-07-15 PROCEDURE — 36415 COLL VENOUS BLD VENIPUNCTURE: CPT

## 2019-07-15 PROCEDURE — 99213 OFFICE O/P EST LOW 20 MIN: CPT | Mod: 25

## 2019-07-16 NOTE — PAST MEDICAL HISTORY
[Menarche Age ____] : age at menarche was [unfilled] [Menopause Age____] : age at menopause was [unfilled] [Total Preg ___] : G[unfilled] [Live Births ___] : P[unfilled]  [Full Term ___] : Full Term: [unfilled] [Living ___] : Living: [unfilled]

## 2019-07-16 NOTE — HISTORY OF PRESENT ILLNESS
[FreeTextEntry1] : abnormal US [de-identified] : 83 y.o female with PMHx significant for HTN, HLD, DM2, diabetic neuropathy, PAD s/p Right LE Stent (?), presenting for follow up of abnormal labs, pt found with Cr of 3.66 (UNKNOWN BASELINE), BUN of 62, was sent to get Renal US found with Medical renal disease.

## 2019-07-16 NOTE — HEALTH RISK ASSESSMENT
[Intercurrent hospitalizations] : was admitted to the hospital  [No] : In the past 12 months have you used drugs other than those required for medical reasons? No [No falls in past year] : Patient reported no falls in the past year [0] : 1) Little interest or pleasure doing things: Not at all (0) [3] : 2) Feeling down, depressed, or hopeless for nearly every day (3) [] : No [de-identified] : 2018- stent placement in leg- as per patient  [de-identified] : none [de-identified] : regular  [UMJ7Snxvk] : 3 [VUA7Qfacy] : 9

## 2019-07-16 NOTE — PHYSICAL EXAM
[No Acute Distress] : no acute distress [Well Nourished] : well nourished [Well Developed] : well developed [Well-Appearing] : well-appearing [Normal] : soft, non-tender, non-distended, no masses palpated, no HSM and normal bowel sounds

## 2019-07-16 NOTE — ASSESSMENT
[FreeTextEntry1] : 83 y.o female with PMHx significant for HTN, HLD, DM2, diabetic neuropathy, PAD s/p Right LE Stent (?), presenting for follow up of abnormal labs.\par \par \par RENAL: CKD Stage 5\par -Renal US with Medical Renal Disease\par -Referral to see Nephrology Dr Nahum segovia\par -Will trend Cr with CMP today.\par -Avoid Nephrotoxins\par \par ENDOCRINE/NEUROLOGY: h/o T2DM, diabetic neuropathy.\par -Last HgA1c in house 7.5\par -Currently on Glipizide and Januvia\par -Recommend fasting BS <130.\par -Moderate exercise recommended\par -Dietary changes discussed.\par -Change Gabapentin 100 mg TID to 200 mg at bedtime due to poor Cr clearance.\par -Prescription for MetanX sent to pharmacy, unknown wether will be covered by insurance.\par -Reports last Diabetic eye exam recently, will request records.\par \par [facility]: h/o HTN, HLD, PAD s/p stent to RLE for severe right leg claudication to right tibio-Peroneal and balloon Angioplasty to Right posterior tibial and right anterior tibial.\par -Cardiology - [facility], will request records\par -Stent placed by Dr Anurag Mcgee.\par -Blood pressure well controlled on current regimen\par -Continue Carvedilol, Clonidine, Hydralazine, Simvastatin, Clopidogrel.\par -Discontinue Losartan, may need to Increase Hydralazine if BP uncontrolled..\par -Moderate exercise recommended.\par -NST 4/30/19 negative for Ischemia\par -Carotid duplex Mild-Mod 16-49% stenosis of Right bulb and pRICA. Mild to Mod 16-49% stenosis of the left Bulb and pLICA\par -2D Echo (5/20/19) EF 65-70%, normal LVSF, mild to mod AV stenosis.\par \par GI PPx- Continue Famotidine\par \par HEME: Anemia of chronic disease\par -Will check CBC today.\par \par HCM:\par -Will obtain CBC, CMP, today.\par -Will request records from previous PMD RE: immunizations\par -Will request records from Nephrology, Cardiology, Ophthalmology.\par -RTO after Nephrology visit.

## 2019-07-19 LAB
ALBUMIN SERPL ELPH-MCNC: 4.5 G/DL
ANION GAP SERPL CALC-SCNC: 17 MMOL/L
BASOPHILS # BLD AUTO: 0.02 K/UL
BASOPHILS NFR BLD AUTO: 0.2 %
BUN SERPL-MCNC: 70 MG/DL
CALCIUM SERPL-MCNC: 9.6 MG/DL
CHLORIDE SERPL-SCNC: 99 MMOL/L
CO2 SERPL-SCNC: 23 MMOL/L
CREAT SERPL-MCNC: 3.25 MG/DL
EOSINOPHIL # BLD AUTO: 0.34 K/UL
EOSINOPHIL NFR BLD AUTO: 4.2 %
GLUCOSE SERPL-MCNC: 84 MG/DL
HCT VFR BLD CALC: 30.5 %
HGB BLD-MCNC: 9.2 G/DL
IMM GRANULOCYTES NFR BLD AUTO: 0.2 %
LYMPHOCYTES # BLD AUTO: 1.7 K/UL
LYMPHOCYTES NFR BLD AUTO: 20.8 %
MAN DIFF?: NORMAL
MCHC RBC-ENTMCNC: 28.1 PG
MCHC RBC-ENTMCNC: 30.2 GM/DL
MCV RBC AUTO: 93.3 FL
MONOCYTES # BLD AUTO: 0.63 K/UL
MONOCYTES NFR BLD AUTO: 7.7 %
NEUTROPHILS # BLD AUTO: 5.46 K/UL
NEUTROPHILS NFR BLD AUTO: 66.9 %
PHOSPHATE SERPL-MCNC: 4.9 MG/DL
PLATELET # BLD AUTO: 273 K/UL
POTASSIUM SERPL-SCNC: 5.3 MMOL/L
RBC # BLD: 3.27 M/UL
RBC # FLD: 13.4 %
SODIUM SERPL-SCNC: 139 MMOL/L
WBC # FLD AUTO: 8.17 K/UL

## 2019-08-01 ENCOUNTER — RX RENEWAL (OUTPATIENT)
Age: 84
End: 2019-08-01

## 2019-08-27 ENCOUNTER — APPOINTMENT (OUTPATIENT)
Dept: FAMILY MEDICINE | Facility: CLINIC | Age: 84
End: 2019-08-27
Payer: MEDICARE

## 2019-08-27 VITALS
BODY MASS INDEX: 26.68 KG/M2 | SYSTOLIC BLOOD PRESSURE: 147 MMHG | HEART RATE: 60 BPM | OXYGEN SATURATION: 98 % | WEIGHT: 145 LBS | DIASTOLIC BLOOD PRESSURE: 68 MMHG | HEIGHT: 62 IN | TEMPERATURE: 97.9 F

## 2019-08-27 PROCEDURE — 99214 OFFICE O/P EST MOD 30 MIN: CPT | Mod: 25

## 2019-08-27 PROCEDURE — 36415 COLL VENOUS BLD VENIPUNCTURE: CPT

## 2019-08-27 NOTE — PHYSICAL EXAM
[Well Nourished] : well nourished [No Acute Distress] : no acute distress [Well Developed] : well developed [Well-Appearing] : well-appearing [Normal] : soft, non-tender, non-distended, no masses palpated, no HSM and normal bowel sounds

## 2019-08-27 NOTE — HISTORY OF PRESENT ILLNESS
[FreeTextEntry1] : follow up [de-identified] : 83 y.o female with PMHx significant for HTN, HLD, DM2, diabetic neuropathy, PAD s/p Right LE Stent (?), presenting for follow up of abnormal labs, pt found with Cr of 3.66 (UNKNOWN BASELINE), BUN of 62, was sent to get Renal US found with Medical renal disease.

## 2019-08-27 NOTE — HEALTH RISK ASSESSMENT
[Intercurrent hospitalizations] : was admitted to the hospital  [No] : In the past 12 months have you used drugs other than those required for medical reasons? No [No falls in past year] : Patient reported no falls in the past year [0] : 1) Little interest or pleasure doing things: Not at all (0) [3] : 2) Feeling down, depressed, or hopeless for nearly every day (3) [] : No [de-identified] : 2018- stent placement in leg- as per patient  [de-identified] : none [de-identified] : regular  [FBT3Mbwyr] : 3 [NBQ5Dplvw] : 9

## 2019-08-27 NOTE — ASSESSMENT
[FreeTextEntry1] : 83 y.o female with PMHx significant for HTN, HLD, DM2, diabetic neuropathy, PAD s/p Right LE Stent (?), presenting for follow up of abnormal labs.\par \par RENAL: CKD Stage 5\par -Renal US with Medical Renal Disease\par -Nephrology Dr Sidhu records requested.\par -Avoid Nephrotoxins\par \par ENDOCRINE/NEUROLOGY: h/o T2DM, diabetic neuropathy.\par -Last HgA1c in house 7.5\par -Currently on Glipizide and Januvia\par -Recommend fasting BS <130.\par -Moderate exercise recommended\par -Dietary changes discussed.\par -Change Gabapentin 100 mg TID to 200 mg at bedtime due to poor Cr clearance.\par -Diabetic eye exam July 2019.\par \par [facility]: h/o HTN, HLD, PAD s/p stent to RLE for severe right leg claudication to right tibio-Peroneal and balloon Angioplasty to Right posterior tibial and right anterior tibial.\par -Cardiology - [facility], will request records\par -Stent placed by Dr Anurag Mcgee.\par -Blood pressure well controlled on current regimen\par -Continue Carvedilol, Clonidine, Hydralazine, Simvastatin, Clopidogrel.\par -Discontinue Losartan, may need to Increase Hydralazine if BP uncontrolled..\par -Moderate exercise recommended.\par -NST 4/30/19 negative for Ischemia\par -Carotid duplex Mild-Mod 16-49% stenosis of Right bulb and pRICA. Mild to Mod 16-49% stenosis of the left Bulb and pLICA\par -2D Echo (5/20/19) EF 65-70%, normal LVSF, mild to mod AV stenosis.\par \par GI PPx- Continue Famotidine\par \par HEME: Anemia of chronic disease\par -Will check CBC today.\par \par NEURO: Memory difficulties. \par -Pt unable to recall words (2 OF 3), not able to draw Clock showing 12:10\par -Will start Namenda 5 mg daily, will watch for side effects, may titrate up as needed.\par \par HCM:\par -Will request records from previous PMD RE: immunizations\par -Will request records from Nephrology, Cardiology, Ophthalmology.\par -Will follow up with Dr Sidhu in October, will RTO at the beginning of Oct for Flu vaccine, diabetes check and pre-visit blood work for Dr Sidhu.

## 2019-09-03 LAB
ALBUMIN SERPL ELPH-MCNC: 4.6 G/DL
ANION GAP SERPL CALC-SCNC: 16 MMOL/L
BASOPHILS # BLD AUTO: 0.02 K/UL
BASOPHILS NFR BLD AUTO: 0.3 %
BUN SERPL-MCNC: 65 MG/DL
CALCIUM SERPL-MCNC: 10.4 MG/DL
CHLORIDE SERPL-SCNC: 102 MMOL/L
CO2 SERPL-SCNC: 25 MMOL/L
CREAT SERPL-MCNC: 2.97 MG/DL
EOSINOPHIL # BLD AUTO: 0.27 K/UL
EOSINOPHIL NFR BLD AUTO: 3.6 %
GLUCOSE SERPL-MCNC: 127 MG/DL
HCT VFR BLD CALC: 30.8 %
HGB BLD-MCNC: 9.4 G/DL
IMM GRANULOCYTES NFR BLD AUTO: 0.3 %
LYMPHOCYTES # BLD AUTO: 1.58 K/UL
LYMPHOCYTES NFR BLD AUTO: 21.2 %
MAN DIFF?: NORMAL
MCHC RBC-ENTMCNC: 28.7 PG
MCHC RBC-ENTMCNC: 30.5 GM/DL
MCV RBC AUTO: 93.9 FL
MONOCYTES # BLD AUTO: 0.56 K/UL
MONOCYTES NFR BLD AUTO: 7.5 %
NEUTROPHILS # BLD AUTO: 5 K/UL
NEUTROPHILS NFR BLD AUTO: 67.1 %
PHOSPHATE SERPL-MCNC: 4.8 MG/DL
PLATELET # BLD AUTO: 281 K/UL
POTASSIUM SERPL-SCNC: 5.3 MMOL/L
RBC # BLD: 3.28 M/UL
RBC # FLD: 12.8 %
SODIUM SERPL-SCNC: 143 MMOL/L
WBC # FLD AUTO: 7.45 K/UL

## 2019-09-19 ENCOUNTER — RX RENEWAL (OUTPATIENT)
Age: 84
End: 2019-09-19

## 2019-10-11 ENCOUNTER — APPOINTMENT (OUTPATIENT)
Dept: FAMILY MEDICINE | Facility: CLINIC | Age: 84
End: 2019-10-11

## 2019-11-21 ENCOUNTER — RX RENEWAL (OUTPATIENT)
Age: 84
End: 2019-11-21

## 2019-11-26 ENCOUNTER — APPOINTMENT (OUTPATIENT)
Dept: FAMILY MEDICINE | Facility: CLINIC | Age: 84
End: 2019-11-26
Payer: MEDICARE

## 2019-11-26 ENCOUNTER — RESULT CHARGE (OUTPATIENT)
Age: 84
End: 2019-11-26

## 2019-11-26 VITALS
HEIGHT: 62 IN | DIASTOLIC BLOOD PRESSURE: 57 MMHG | HEART RATE: 55 BPM | SYSTOLIC BLOOD PRESSURE: 158 MMHG | WEIGHT: 138 LBS | OXYGEN SATURATION: 99 % | BODY MASS INDEX: 25.4 KG/M2 | TEMPERATURE: 98 F

## 2019-11-26 PROCEDURE — 36415 COLL VENOUS BLD VENIPUNCTURE: CPT

## 2019-11-26 PROCEDURE — 90670 PCV13 VACCINE IM: CPT

## 2019-11-26 PROCEDURE — G0009: CPT

## 2019-11-26 PROCEDURE — 83036 HEMOGLOBIN GLYCOSYLATED A1C: CPT | Mod: QW

## 2019-11-26 PROCEDURE — 99214 OFFICE O/P EST MOD 30 MIN: CPT | Mod: 25

## 2019-11-26 RX ORDER — LOSARTAN POTASSIUM 100 MG/1
100 TABLET, FILM COATED ORAL DAILY
Qty: 90 | Refills: 1 | Status: DISCONTINUED | COMMUNITY
Start: 2017-07-21 | End: 2019-11-26

## 2019-11-26 NOTE — HISTORY OF PRESENT ILLNESS
[FreeTextEntry1] : blood pressure and diabetes check. [de-identified] : 83 y.o female with PMHx significant for HTN, HLD, DM2, diabetic neuropathy, PAD s/p Right LE Stent (?), presenting for follow up of abnormal labs, pt found with Cr of 3.66 (UNKNOWN BASELINE), BUN of 62, was sent to get Renal US found with Medical renal disease.

## 2019-11-26 NOTE — ASSESSMENT
[FreeTextEntry1] : 83 y.o female with PMHx significant for HTN, HLD, DM2, diabetic neuropathy, PAD s/p Right LE Stent (?), presenting for follow up of diabetes,high blood pressure.\par \par RENAL: CKD Stage 5\par -Check Renal panel today.\par -Renal US with Medical Renal Disease\par -Nephrology Dr Sidhu records requested, family requests Nephrology referral as Dr Sidhu unable to see her periodically.\par -Avoid Nephrotoxins\par \par ENDOCRINE/NEUROLOGY: h/o T2DM, diabetic neuropathy.\par -Last HgA1c 7.4, today 7.2 in house.\par -Currently on Glipizide and Januvia\par -Recommend fasting BS <130.\par -Moderate exercise recommended\par -Dietary changes discussed.\par -Change Gabapentin 100 mg TID to 200 mg at bedtime due to poor Cr clearance.\par -Diabetic eye exam July 2019.\par \par [facility]: h/o HTN, HLD, PAD s/p stent to RLE for severe right leg claudication to right tibio-Peroneal and balloon Angioplasty to Right posterior tibial and right anterior tibial.\par -Cardiology - [facility], will request records\par -Stent placed by Dr Anurag Mcgee.\par -Blood pressure well controlled on current regimen\par -Continue Carvedilol, Clonidine, Hydralazine, Simvastatin, Clopidogrel.\par -Will increase Hydralazine to 50 mg tid as her BP is uncontrolled.\par -Moderate exercise recommended.\par -NST 4/30/19 negative for Ischemia\par -Carotid duplex Mild-Mod 16-49% stenosis of Right bulb and pRICA. Mild to Mod 16-49% stenosis of the left Bulb and pLICA\par -2D Echo (5/20/19) EF 65-70%, normal LVSF, mild to mod AV stenosis.\par \par GI PPx- Continue Famotidine\par \par HEME: Anemia of chronic disease\par -Will check CBC today.\par \par NEURO: Memory difficulties. \par -Continue Namenda 5 mg daily.\par \par HCM:\par -Will request records from previous PMD RE: immunizations\par -Will request records from Nephrology, Cardiology, Ophthalmology.\par -Received Prevnar 13 today in office\par -Reports Flu vaccine at local pharmacy  in Oct 2019

## 2019-11-26 NOTE — HEALTH RISK ASSESSMENT
[Intercurrent hospitalizations] : was admitted to the hospital  [No] : In the past 12 months have you used drugs other than those required for medical reasons? No [No falls in past year] : Patient reported no falls in the past year [0] : 1) Little interest or pleasure doing things: Not at all (0) [3] : 2) Feeling down, depressed, or hopeless for nearly every day (3) [] : No [de-identified] : regular  [de-identified] : none [de-identified] : 2018- stent placement in leg- as per patient  [OPW6Clagh] : 3 [GCE0Hvlht] : 9

## 2019-11-26 NOTE — COUNSELING
[Fall prevention counseling provided] : Fall prevention counseling provided [Adequate lighting] : Adequate lighting [Use proper foot wear] : Use proper foot wear [AUDIT-C Screening administered and reviewed] : AUDIT-C Screening administered and reviewed [Good understanding] : Patient has a good understanding of lifestyle changes and steps needed to achieve self management goal [None] : None

## 2019-12-22 LAB
ALBUMIN SERPL ELPH-MCNC: 4 G/DL
ANION GAP SERPL CALC-SCNC: 13 MMOL/L
BUN SERPL-MCNC: 61 MG/DL
CALCIUM SERPL-MCNC: 9.9 MG/DL
CHLORIDE SERPL-SCNC: 100 MMOL/L
CO2 SERPL-SCNC: 27 MMOL/L
CREAT SERPL-MCNC: 2.61 MG/DL
GLUCOSE SERPL-MCNC: 317 MG/DL
HBA1C MFR BLD HPLC: 7.2
PHOSPHATE SERPL-MCNC: 4.6 MG/DL
POTASSIUM SERPL-SCNC: 5 MMOL/L
SODIUM SERPL-SCNC: 140 MMOL/L

## 2020-01-04 ENCOUNTER — MEDICATION RENEWAL (OUTPATIENT)
Age: 85
End: 2020-01-04

## 2020-01-08 ENCOUNTER — RX RENEWAL (OUTPATIENT)
Age: 85
End: 2020-01-08

## 2020-01-10 ENCOUNTER — APPOINTMENT (OUTPATIENT)
Dept: NEPHROLOGY | Facility: CLINIC | Age: 85
End: 2020-01-10
Payer: MEDICARE

## 2020-01-10 VITALS
BODY MASS INDEX: 26.31 KG/M2 | HEART RATE: 56 BPM | HEIGHT: 62 IN | SYSTOLIC BLOOD PRESSURE: 135 MMHG | WEIGHT: 143 LBS | DIASTOLIC BLOOD PRESSURE: 61 MMHG

## 2020-01-10 PROCEDURE — 99205 OFFICE O/P NEW HI 60 MIN: CPT | Mod: 25

## 2020-01-10 PROCEDURE — 36415 COLL VENOUS BLD VENIPUNCTURE: CPT

## 2020-01-10 RX ORDER — BLOOD SUGAR DIAGNOSTIC
STRIP MISCELLANEOUS
Qty: 100 | Refills: 5 | Status: DISCONTINUED | COMMUNITY
Start: 2017-04-19 | End: 2020-01-10

## 2020-01-10 NOTE — HISTORY OF PRESENT ILLNESS
[FreeTextEntry1] : DM , HTN - w. Known CKD - 4, \par \par Cardiology : SHG, No C/O, \par \par SBP @ Home 140 -160 mm.,\par \par -250 mg.,\par \par Office SBP < 130.

## 2020-01-10 NOTE — PHYSICAL EXAM
[General Appearance - Alert] : alert [General Appearance - In No Acute Distress] : in no acute distress [FreeTextEntry1] : Pale,  [Outer Ear] : the ears and nose were normal in appearance [Oropharynx] : the oropharynx was normal [Neck Appearance] : the appearance of the neck was normal [Neck Cervical Mass (___cm)] : no neck mass was observed [Jugular Venous Distention Increased] : there was no jugular-venous distention [Thyroid Diffuse Enlargement] : the thyroid was not enlarged [Thyroid Nodule] : there were no palpable thyroid nodules [Auscultation Breath Sounds / Voice Sounds] : lungs were clear to auscultation bilaterally [Heart Rate And Rhythm] : heart rate was normal and rhythm regular [Heart Sounds] : normal S1 and S2 [Heart Sounds Gallop] : no gallops [Murmurs] : no murmurs [Heart Sounds Pericardial Friction Rub] : no pericardial rub [Full Pulse] : the pedal pulses are present [Edema] : there was no peripheral edema [Bowel Sounds] : normal bowel sounds [Abdomen Soft] : soft [Abdomen Tenderness] : non-tender [Cervical Lymph Nodes Enlarged Posterior Bilaterally] : posterior cervical [Abdomen Mass (___ Cm)] : no abdominal mass palpated [Cervical Lymph Nodes Enlarged Anterior Bilaterally] : anterior cervical [Supraclavicular Lymph Nodes Enlarged Bilaterally] : supraclavicular [Axillary Lymph Nodes Enlarged Bilaterally] : axillary [Femoral Lymph Nodes Enlarged Bilaterally] : femoral [Inguinal Lymph Nodes Enlarged Bilaterally] : inguinal [No CVA Tenderness] : no ~M costovertebral angle tenderness [No Spinal Tenderness] : no spinal tenderness [Abnormal Walk] : normal gait [Nail Clubbing] : no clubbing  or cyanosis of the fingernails [Musculoskeletal - Swelling] : no joint swelling seen [Motor Tone] : muscle strength and tone were normal [Skin Color & Pigmentation] : normal skin color and pigmentation [Skin Turgor] : normal skin turgor [] : no rash [Deep Tendon Reflexes (DTR)] : deep tendon reflexes were 2+ and symmetric [No Focal Deficits] : no focal deficits [Sensation] : the sensory exam was normal to light touch and pinprick [Oriented To Time, Place, And Person] : oriented to person, place, and time [Impaired Insight] : insight and judgment were intact [Affect] : the affect was normal

## 2020-01-10 NOTE — ASSESSMENT
[FreeTextEntry1] : Diabetes mellitus (250.00) (E11.9) HTN (hypertension) (401.9) (I10) CKD (chronic kidney disease), stage V (585.5) (N18.5) Diabetic neuropathy (250.60,357.2) (E11.40) Need for Streptococcus pneumoniae vaccination (V03.82) (Z23)  83 y.o female with PMHx significant for HTN, HLD, DM2, diabetic neuropathy, PAD s/p Right LE Stent (?), presenting for follow up of diabetes,high blood pressure.  RENAL: CKD Stage 5 -Check Renal panel today. -Renal US with Medical Renal Disease -Nephrology Dr Sidhu records requested, family requests Nephrology referral as Dr Sidhu unable to see her periodically. -Avoid Nephrotoxins  ENDOCRINE/NEUROLOGY: h/o T2DM, diabetic neuropathy. -Last HgA1c 7.4, today 7.2 in house. -Currently on Glipizide and Januvia -Recommend fasting BS <130. -Moderate exercise recommended -Dietary changes discussed. -Change Gabapentin 100 mg TID to 200 mg at bedtime due to poor Cr clearance. -Diabetic eye exam July 2019.  CVS: h/o HTN, HLD, PAD s/p stent to RLE for severe right leg claudication to right tibio-Peroneal and balloon Angioplasty to Right posterior tibial and right anterior tibial. -Cardiology - [facility], will request records -Stent placed by Dr Anurag Mcgee. -Blood pressure well controlled on current regimen -Continue Carvedilol, Clonidine, Hydralazine, Simvastatin, Clopidogrel. -Will increase Hydralazine to 50 mg tid as her BP is uncontrolled. -Moderate exercise recommended. -NST 4/30/19 negative for Ischemia -Carotid duplex Mild-Mod 16-49% stenosis of Right bulb and pRICA. Mild to Mod 16-49% stenosis of the left Bulb and pLICA -2D Echo (5/20/19) EF 65-70%, normal LVSF, mild to mod AV stenosis.  GI PPx- Continue Famotidine  HEME: Anemia of chronic disease -Will check CBC today.  NEURO: Memory difficulties.  -Continue Namenda 5 mg daily.  HCM: -Will request records from previous PMD RE: immunizations -Will request records from Nephrology, Cardiology, Ophthalmology. -Received Prevnar 13 today in office -Reports Flu vaccine at local pharmacy in Oct 2019.     Plan CKD (chronic kidney disease), stage V  Nephrology Referral Outpatient  .  Status: Hold For - Scheduling  Requested for: 26Nov2019 CKD (chronic kidney disease), stage V, Diabetes mellitus  Renal Panel; Status:Active; Requested for:26Nov2019;  Diabetes mellitus  POCT - Hemoglobin A1C; Status:Resulted - Requires Verification;   Done: 26Nov2019 11:34AM Diabetic neuropathy, Health Maintenance  Renew: Foltanx RF 3-90.314-2-35 MG Oral Capsule; TOME MIKE CAPSULA DOS VECES AL ETHAN HTN (hypertension)  Changed: From  hydrALAZINE HCl - 25 MG Oral Tablet TAKE 1 TABLET 3 times daily To hydrALAZINE HCl - 50 MG Oral Tablet TAKE 1 TABLET 3 TIMES DAILY Need for Streptococcus pneumoniae vaccination  Administered: Prevnar 13 Intramuscular Suspension  End of Encounter Meds Aspirin 325 MG Oral Tablet; TAKE 1 TABLET DAILY Carvedilol 12.5 MG Oral Tablet; TAKE 1 TABLET BY MOUTH TWICE A DAY WITH FOOD cloNIDine HCl - 0.1 MG Oral Tablet; TAKE 1 TABLET 3 times daily one tablet in am, 2 tabs at night Clopidogrel Bisulfate 75 MG Oral Tablet; TAKE 1 TABLET BY MOUTH EVERY DAY Famotidine 40 MG Oral Tablet Foltanx RF 3-90.314-2-35 MG Oral Capsule; TOME MIKE CAPSULA DOS VECES AL ETHAN FreeStyle Lite Test In Vitro Strip; TEST TWICE A DAY Gabapentin 100 MG Oral Capsule; TOME 2 CAPSULAS POR VIA ORAL LAYO VECES AL ETHAN glipiZIDE 10 MG Oral Tablet; TAKE 1 TABLET DAILY AS DIRECTED hydrALAZINE HCl - 50 MG Oral Tablet; TAKE 1 TABLET 3 TIMES DAILY Januvia 100 MG Oral Tablet; TAKE 1 TABLET ONCE DAILY Memantine HCl - 5 MG Oral Tablet; TAKE 1 TABLET ONCE DAILY Simvastatin 20 MG Oral Tablet; TAKE 1 TABLET DAILY IN THE EVENING  El\par \par P : Hold Losartan, Lasix PRN,\par \par RTO X 3 weeks,

## 2020-01-11 LAB
25(OH)D3 SERPL-MCNC: 35.8 NG/ML
ALBUMIN SERPL ELPH-MCNC: 4.3 G/DL
ANION GAP SERPL CALC-SCNC: 16 MMOL/L
BUN SERPL-MCNC: 61 MG/DL
CALCIUM SERPL-MCNC: 9.9 MG/DL
CALCIUM SERPL-MCNC: 9.9 MG/DL
CHLORIDE SERPL-SCNC: 100 MMOL/L
CO2 SERPL-SCNC: 24 MMOL/L
CREAT SERPL-MCNC: 2.39 MG/DL
ESTIMATED AVERAGE GLUCOSE: 180 MG/DL
GLUCOSE SERPL-MCNC: 391 MG/DL
HBA1C MFR BLD HPLC: 7.9 %
PARATHYROID HORMONE INTACT: 26 PG/ML
PHOSPHATE SERPL-MCNC: 4 MG/DL
POTASSIUM SERPL-SCNC: 4.5 MMOL/L
SODIUM SERPL-SCNC: 140 MMOL/L

## 2020-01-12 LAB
APPEARANCE: CLEAR
BACTERIA: NEGATIVE
BASOPHILS # BLD AUTO: 0.01 K/UL
BASOPHILS NFR BLD AUTO: 0.1 %
BILIRUBIN URINE: NEGATIVE
BLOOD URINE: NEGATIVE
COLOR: NORMAL
CREAT SPEC-SCNC: 94 MG/DL
CREAT/PROT UR: 0.2 RATIO
EOSINOPHIL # BLD AUTO: 0.14 K/UL
EOSINOPHIL NFR BLD AUTO: 2 %
GLUCOSE QUALITATIVE U: ABNORMAL
HCT VFR BLD CALC: 26.5 %
HGB BLD-MCNC: 7.4 G/DL
HYALINE CASTS: 2 /LPF
IMM GRANULOCYTES NFR BLD AUTO: 0.6 %
KETONES URINE: NEGATIVE
LEUKOCYTE ESTERASE URINE: NEGATIVE
LYMPHOCYTES # BLD AUTO: 1.02 K/UL
LYMPHOCYTES NFR BLD AUTO: 14.9 %
MAN DIFF?: NORMAL
MCHC RBC-ENTMCNC: 23.6 PG
MCHC RBC-ENTMCNC: 27.9 GM/DL
MCV RBC AUTO: 84.7 FL
MICROSCOPIC-UA: NORMAL
MONOCYTES # BLD AUTO: 0.43 K/UL
MONOCYTES NFR BLD AUTO: 6.3 %
NEUTROPHILS # BLD AUTO: 5.22 K/UL
NEUTROPHILS NFR BLD AUTO: 76.1 %
NITRITE URINE: NEGATIVE
PH URINE: 5.5
PLATELET # BLD AUTO: 303 K/UL
PROT UR-MCNC: 22 MG/DL
PROTEIN URINE: NORMAL
RBC # BLD: 3.13 M/UL
RBC # FLD: 16.2 %
RED BLOOD CELLS URINE: 0 /HPF
SPECIFIC GRAVITY URINE: 1.02
SQUAMOUS EPITHELIAL CELLS: 2 /HPF
UROBILINOGEN URINE: NORMAL
WBC # FLD AUTO: 6.86 K/UL
WHITE BLOOD CELLS URINE: 3 /HPF

## 2020-01-30 ENCOUNTER — RX RENEWAL (OUTPATIENT)
Age: 85
End: 2020-01-30

## 2020-02-07 ENCOUNTER — APPOINTMENT (OUTPATIENT)
Dept: NEPHROLOGY | Facility: CLINIC | Age: 85
End: 2020-02-07
Payer: MEDICARE

## 2020-02-07 VITALS
WEIGHT: 145 LBS | DIASTOLIC BLOOD PRESSURE: 59 MMHG | SYSTOLIC BLOOD PRESSURE: 140 MMHG | HEIGHT: 62 IN | HEART RATE: 58 BPM | BODY MASS INDEX: 26.68 KG/M2

## 2020-02-07 DIAGNOSIS — R80.9 PROTEINURIA, UNSPECIFIED: ICD-10-CM

## 2020-02-07 PROCEDURE — 99214 OFFICE O/P EST MOD 30 MIN: CPT

## 2020-02-07 NOTE — HISTORY OF PRESENT ILLNESS
[FreeTextEntry1] : DM , HTN - w. Known CKD - 4, \par \par Cardiology : SHG, No C/O, \par \par Office SBP < 130  mm.,  Feels well,

## 2020-02-07 NOTE — PHYSICAL EXAM
[General Appearance - In No Acute Distress] : in no acute distress [General Appearance - Alert] : alert [Outer Ear] : the ears and nose were normal in appearance [Neck Appearance] : the appearance of the neck was normal [Neck Cervical Mass (___cm)] : no neck mass was observed [Oropharynx] : the oropharynx was normal [Thyroid Nodule] : there were no palpable thyroid nodules [Thyroid Diffuse Enlargement] : the thyroid was not enlarged [Jugular Venous Distention Increased] : there was no jugular-venous distention [Auscultation Breath Sounds / Voice Sounds] : lungs were clear to auscultation bilaterally [Heart Rate And Rhythm] : heart rate was normal and rhythm regular [Heart Sounds] : normal S1 and S2 [Murmurs] : no murmurs [Heart Sounds Gallop] : no gallops [Heart Sounds Pericardial Friction Rub] : no pericardial rub [Full Pulse] : the pedal pulses are present [Bowel Sounds] : normal bowel sounds [Edema] : there was no peripheral edema [Abdomen Soft] : soft [Abdomen Mass (___ Cm)] : no abdominal mass palpated [Abdomen Tenderness] : non-tender [Cervical Lymph Nodes Enlarged Posterior Bilaterally] : posterior cervical [Cervical Lymph Nodes Enlarged Anterior Bilaterally] : anterior cervical [Axillary Lymph Nodes Enlarged Bilaterally] : axillary [Supraclavicular Lymph Nodes Enlarged Bilaterally] : supraclavicular [No CVA Tenderness] : no ~M costovertebral angle tenderness [Inguinal Lymph Nodes Enlarged Bilaterally] : inguinal [Femoral Lymph Nodes Enlarged Bilaterally] : femoral [No Spinal Tenderness] : no spinal tenderness [Musculoskeletal - Swelling] : no joint swelling seen [Abnormal Walk] : normal gait [Nail Clubbing] : no clubbing  or cyanosis of the fingernails [Skin Color & Pigmentation] : normal skin color and pigmentation [Motor Tone] : muscle strength and tone were normal [Skin Turgor] : normal skin turgor [] : no rash [Deep Tendon Reflexes (DTR)] : deep tendon reflexes were 2+ and symmetric [Sensation] : the sensory exam was normal to light touch and pinprick [No Focal Deficits] : no focal deficits [Oriented To Time, Place, And Person] : oriented to person, place, and time [Impaired Insight] : insight and judgment were intact [Affect] : the affect was normal [FreeTextEntry1] : Pale,

## 2020-02-07 NOTE — ASSESSMENT
[FreeTextEntry1] : Diabetes mellitus (250.00) (E11.9) HTN (hypertension) (401.9) (I10) CKD (chronic kidney disease), stage V (585.5) (N18.5) Diabetic neuropathy (250.60,357.2) (E11.40) Need for Streptococcus pneumoniae vaccination (V03.82) (Z23)\par \par   83 y.o female with PMHx significant for HTN, HLD, DM2, diabetic neuropathy, PAD s/p Right LE Stent (?), presenting for follow up of diabetes,high blood pressure.  RENAL: CKD Stage 5 -Check Renal panel today. -Renal US with Medical Renal Disease - to poor Cr clearance. -Diabetic eye exam July 2019.  CVS: h/o HTN,  negative for Ischemia -Carotid duplex Mild-Mod 16-49% stenosis of Right bulb and pRICA. Mild to Mod 16-49% stenosis of the left Bulb and pLICA -2D Echo (5/20/19) EF 65-70%, normal LVSF, mild to mod AV stenosis.   \par \par P : Hold Losartan, Lasix PRN,\par \par Clinically well, Ref. to Hematology for  Management of anemia, \par \par RTO X 3 weeks,

## 2020-02-11 ENCOUNTER — OUTPATIENT (OUTPATIENT)
Dept: OUTPATIENT SERVICES | Facility: HOSPITAL | Age: 85
LOS: 1 days | Discharge: HOME | End: 2020-02-11

## 2020-02-11 DIAGNOSIS — Z86.69 PERSONAL HISTORY OF OTHER DISEASES OF THE NERVOUS SYSTEM AND SENSE ORGANS: Chronic | ICD-10-CM

## 2020-02-11 DIAGNOSIS — D64.9 ANEMIA, UNSPECIFIED: ICD-10-CM

## 2020-02-18 ENCOUNTER — APPOINTMENT (OUTPATIENT)
Dept: HEMATOLOGY ONCOLOGY | Facility: CLINIC | Age: 85
End: 2020-02-18
Payer: MEDICARE

## 2020-02-18 VITALS
HEIGHT: 62 IN | OXYGEN SATURATION: 97 % | BODY MASS INDEX: 26.87 KG/M2 | TEMPERATURE: 98.3 F | HEART RATE: 64 BPM | SYSTOLIC BLOOD PRESSURE: 203 MMHG | WEIGHT: 146.01 LBS | DIASTOLIC BLOOD PRESSURE: 70 MMHG

## 2020-02-18 PROCEDURE — 99204 OFFICE O/P NEW MOD 45 MIN: CPT

## 2020-02-18 NOTE — HISTORY OF PRESENT ILLNESS
[de-identified] : Ms. Hastings is an 83 yo presenting for initial consultation for anemia. She is [place] speaking and is accompanied by her son who acted as . \par She is taking iron supplements. \par She has a history of diabetes and renal failure, secondary to diabetes. She follows with Dr. Strickland.  [de-identified] : Patient presents for initial evaluation.\par Feeling tired and fatigue. Son reports a loss of energy.\par Is taking iron supplements.\par No other complaints today.

## 2020-02-18 NOTE — ASSESSMENT
[FreeTextEntry1] : Plan:\par -Labs including B12 folate, ferratin, and serum electrophoresis to rule out multiple myeloma\par -Aranesp 300 mcg q3 weeks and will observe for response\par

## 2020-02-24 ENCOUNTER — APPOINTMENT (OUTPATIENT)
Age: 85
End: 2020-02-24

## 2020-02-24 ENCOUNTER — RX RENEWAL (OUTPATIENT)
Age: 85
End: 2020-02-24

## 2020-02-24 ENCOUNTER — RESULT REVIEW (OUTPATIENT)
Age: 85
End: 2020-02-24

## 2020-02-24 LAB
BASOPHILS # BLD AUTO: 0 K/UL — SIGNIFICANT CHANGE UP (ref 0–0.2)
BASOPHILS NFR BLD AUTO: 0.5 % — SIGNIFICANT CHANGE UP (ref 0–2)
EOSINOPHIL # BLD AUTO: 0.2 K/UL — SIGNIFICANT CHANGE UP (ref 0–0.5)
EOSINOPHIL NFR BLD AUTO: 2.7 % — SIGNIFICANT CHANGE UP (ref 0–6)
HCT VFR BLD CALC: 29.6 % — LOW (ref 34.5–45)
HGB BLD-MCNC: 9.2 G/DL — LOW (ref 11.5–15.5)
LYMPHOCYTES # BLD AUTO: 0.9 K/UL — LOW (ref 1–3.3)
LYMPHOCYTES # BLD AUTO: 10.5 % — LOW (ref 13–44)
MCHC RBC-ENTMCNC: 25.6 PG — LOW (ref 27–34)
MCHC RBC-ENTMCNC: 31 G/DL — LOW (ref 32–36)
MCV RBC AUTO: 82.6 FL — SIGNIFICANT CHANGE UP (ref 80–100)
MONOCYTES # BLD AUTO: 0.6 K/UL — SIGNIFICANT CHANGE UP (ref 0–0.9)
MONOCYTES NFR BLD AUTO: 7.2 % — SIGNIFICANT CHANGE UP (ref 2–14)
NEUTROPHILS # BLD AUTO: 6.6 K/UL — SIGNIFICANT CHANGE UP (ref 1.8–7.4)
NEUTROPHILS NFR BLD AUTO: 79.1 % — HIGH (ref 43–77)
PLATELET # BLD AUTO: 254 K/UL — SIGNIFICANT CHANGE UP (ref 150–400)
RBC # BLD: 3.58 M/UL — LOW (ref 3.8–5.2)
RBC # FLD: 17.8 % — HIGH (ref 10.3–14.5)
WBC # BLD: 8.3 K/UL — SIGNIFICANT CHANGE UP (ref 3.8–10.5)
WBC # FLD AUTO: 8.3 K/UL — SIGNIFICANT CHANGE UP (ref 3.8–10.5)

## 2020-02-25 DIAGNOSIS — N18.5 CHRONIC KIDNEY DISEASE, STAGE 5: ICD-10-CM

## 2020-02-25 DIAGNOSIS — D63.1 ANEMIA IN CHRONIC KIDNEY DISEASE: ICD-10-CM

## 2020-03-12 ENCOUNTER — OUTPATIENT (OUTPATIENT)
Dept: OUTPATIENT SERVICES | Facility: HOSPITAL | Age: 85
LOS: 1 days | Discharge: HOME | End: 2020-03-12

## 2020-03-12 DIAGNOSIS — N18.5 CHRONIC KIDNEY DISEASE, STAGE 5: ICD-10-CM

## 2020-03-12 DIAGNOSIS — D63.1 ANEMIA IN CHRONIC KIDNEY DISEASE: ICD-10-CM

## 2020-03-12 DIAGNOSIS — Z86.69 PERSONAL HISTORY OF OTHER DISEASES OF THE NERVOUS SYSTEM AND SENSE ORGANS: Chronic | ICD-10-CM

## 2020-03-16 ENCOUNTER — RESULT REVIEW (OUTPATIENT)
Age: 85
End: 2020-03-16

## 2020-03-16 ENCOUNTER — APPOINTMENT (OUTPATIENT)
Age: 85
End: 2020-03-16

## 2020-03-16 LAB
BASOPHILS # BLD AUTO: 0 K/UL — SIGNIFICANT CHANGE UP (ref 0–0.2)
BASOPHILS NFR BLD AUTO: 0.7 % — SIGNIFICANT CHANGE UP (ref 0–2)
EOSINOPHIL # BLD AUTO: 0.2 K/UL — SIGNIFICANT CHANGE UP (ref 0–0.5)
EOSINOPHIL NFR BLD AUTO: 3.4 % — SIGNIFICANT CHANGE UP (ref 0–6)
HCT VFR BLD CALC: 34.6 % — SIGNIFICANT CHANGE UP (ref 34.5–45)
HGB BLD-MCNC: 10.4 G/DL — LOW (ref 11.5–15.5)
LYMPHOCYTES # BLD AUTO: 0.9 K/UL — LOW (ref 1–3.3)
LYMPHOCYTES # BLD AUTO: 13 % — SIGNIFICANT CHANGE UP (ref 13–44)
MCHC RBC-ENTMCNC: 24.6 PG — LOW (ref 27–34)
MCHC RBC-ENTMCNC: 29.9 G/DL — LOW (ref 32–36)
MCV RBC AUTO: 82.3 FL — SIGNIFICANT CHANGE UP (ref 80–100)
MONOCYTES # BLD AUTO: 0.6 K/UL — SIGNIFICANT CHANGE UP (ref 0–0.9)
MONOCYTES NFR BLD AUTO: 8.2 % — SIGNIFICANT CHANGE UP (ref 2–14)
NEUTROPHILS # BLD AUTO: 5.1 K/UL — SIGNIFICANT CHANGE UP (ref 1.8–7.4)
NEUTROPHILS NFR BLD AUTO: 74.7 % — SIGNIFICANT CHANGE UP (ref 43–77)
PLATELET # BLD AUTO: 267 K/UL — SIGNIFICANT CHANGE UP (ref 150–400)
RBC # BLD: 4.21 M/UL — SIGNIFICANT CHANGE UP (ref 3.8–5.2)
RBC # FLD: 16.6 % — HIGH (ref 10.3–14.5)
WBC # BLD: 6.9 K/UL — SIGNIFICANT CHANGE UP (ref 3.8–10.5)
WBC # FLD AUTO: 6.9 K/UL — SIGNIFICANT CHANGE UP (ref 3.8–10.5)

## 2020-03-17 ENCOUNTER — RX RENEWAL (OUTPATIENT)
Age: 85
End: 2020-03-17

## 2020-04-06 ENCOUNTER — APPOINTMENT (OUTPATIENT)
Age: 85
End: 2020-04-06

## 2020-04-27 ENCOUNTER — OUTPATIENT (OUTPATIENT)
Dept: OUTPATIENT SERVICES | Facility: HOSPITAL | Age: 85
LOS: 1 days | Discharge: HOME | End: 2020-04-27

## 2020-04-27 DIAGNOSIS — Z86.69 PERSONAL HISTORY OF OTHER DISEASES OF THE NERVOUS SYSTEM AND SENSE ORGANS: Chronic | ICD-10-CM

## 2020-04-27 DIAGNOSIS — N18.5 CHRONIC KIDNEY DISEASE, STAGE 5: ICD-10-CM

## 2020-04-27 DIAGNOSIS — D63.1 ANEMIA IN CHRONIC KIDNEY DISEASE: ICD-10-CM

## 2020-04-30 ENCOUNTER — RX RENEWAL (OUTPATIENT)
Age: 85
End: 2020-04-30

## 2020-04-30 ENCOUNTER — APPOINTMENT (OUTPATIENT)
Dept: FAMILY MEDICINE | Facility: CLINIC | Age: 85
End: 2020-04-30
Payer: MEDICARE

## 2020-04-30 DIAGNOSIS — Z76.89 PERSONS ENCOUNTERING HEALTH SERVICES IN OTHER SPECIFIED CIRCUMSTANCES: ICD-10-CM

## 2020-04-30 PROCEDURE — 99442: CPT

## 2020-05-04 ENCOUNTER — APPOINTMENT (OUTPATIENT)
Dept: FAMILY MEDICINE | Facility: CLINIC | Age: 85
End: 2020-05-04

## 2020-05-04 ENCOUNTER — RESULT REVIEW (OUTPATIENT)
Age: 85
End: 2020-05-04

## 2020-05-04 ENCOUNTER — APPOINTMENT (OUTPATIENT)
Age: 85
End: 2020-05-04

## 2020-05-04 LAB
BASOPHILS # BLD AUTO: 0.1 K/UL — SIGNIFICANT CHANGE UP (ref 0–0.2)
BASOPHILS NFR BLD AUTO: 0.7 % — SIGNIFICANT CHANGE UP (ref 0–2)
EOSINOPHIL # BLD AUTO: 0.4 K/UL — SIGNIFICANT CHANGE UP (ref 0–0.5)
EOSINOPHIL NFR BLD AUTO: 4.8 % — SIGNIFICANT CHANGE UP (ref 0–6)
HCT VFR BLD CALC: 36.5 % — SIGNIFICANT CHANGE UP (ref 34.5–45)
HGB BLD-MCNC: 11.3 G/DL — LOW (ref 11.5–15.5)
LYMPHOCYTES # BLD AUTO: 1.3 K/UL — SIGNIFICANT CHANGE UP (ref 1–3.3)
LYMPHOCYTES # BLD AUTO: 14.8 % — SIGNIFICANT CHANGE UP (ref 13–44)
MCHC RBC-ENTMCNC: 24.8 PG — LOW (ref 27–34)
MCHC RBC-ENTMCNC: 31.1 G/DL — LOW (ref 32–36)
MCV RBC AUTO: 79.7 FL — LOW (ref 80–100)
MONOCYTES # BLD AUTO: 0.5 K/UL — SIGNIFICANT CHANGE UP (ref 0–0.9)
MONOCYTES NFR BLD AUTO: 5.9 % — SIGNIFICANT CHANGE UP (ref 2–14)
NEUTROPHILS # BLD AUTO: 6.3 K/UL — SIGNIFICANT CHANGE UP (ref 1.8–7.4)
NEUTROPHILS NFR BLD AUTO: 73.8 % — SIGNIFICANT CHANGE UP (ref 43–77)
PLATELET # BLD AUTO: 211 K/UL — SIGNIFICANT CHANGE UP (ref 150–400)
RBC # BLD: 4.58 M/UL — SIGNIFICANT CHANGE UP (ref 3.8–5.2)
RBC # FLD: 16.6 % — HIGH (ref 10.3–14.5)
WBC # BLD: 8.6 K/UL — SIGNIFICANT CHANGE UP (ref 3.8–10.5)
WBC # FLD AUTO: 8.6 K/UL — SIGNIFICANT CHANGE UP (ref 3.8–10.5)

## 2020-05-12 ENCOUNTER — APPOINTMENT (OUTPATIENT)
Dept: FAMILY MEDICINE | Facility: CLINIC | Age: 85
End: 2020-05-12
Payer: MEDICARE

## 2020-05-12 VITALS
WEIGHT: 145 LBS | DIASTOLIC BLOOD PRESSURE: 72 MMHG | HEART RATE: 66 BPM | SYSTOLIC BLOOD PRESSURE: 150 MMHG | OXYGEN SATURATION: 96 % | RESPIRATION RATE: 13 BRPM | HEIGHT: 62 IN | BODY MASS INDEX: 26.68 KG/M2

## 2020-05-12 DIAGNOSIS — Z92.29 PERSONAL HISTORY OF OTHER DRUG THERAPY: ICD-10-CM

## 2020-05-12 PROCEDURE — 36415 COLL VENOUS BLD VENIPUNCTURE: CPT

## 2020-05-12 PROCEDURE — 83036 HEMOGLOBIN GLYCOSYLATED A1C: CPT | Mod: QW

## 2020-05-12 PROCEDURE — 99214 OFFICE O/P EST MOD 30 MIN: CPT | Mod: 25

## 2020-05-12 RX ORDER — BLOOD PRESSURE TEST KIT-LARGE
KIT MISCELLANEOUS
Qty: 1 | Refills: 0 | Status: ACTIVE | COMMUNITY
Start: 2020-05-12 | End: 1900-01-01

## 2020-05-12 NOTE — COUNSELING
[Fall prevention counseling provided] : Fall prevention counseling provided [Adequate lighting] : Adequate lighting [Use proper foot wear] : Use proper foot wear [AUDIT-C Screening administered and reviewed] : AUDIT-C Screening administered and reviewed [None] : None [Good understanding] : Patient has a good understanding of lifestyle changes and steps needed to achieve self management goal [Encouraged to increase physical activity] : Encouraged to increase physical activity [____ min/wk Activity] : [unfilled] min/wk activity

## 2020-05-12 NOTE — HEALTH RISK ASSESSMENT
[Intercurrent hospitalizations] : was admitted to the hospital  [No] : In the past 12 months have you used drugs other than those required for medical reasons? No [No falls in past year] : Patient reported no falls in the past year [0] : 1) Little interest or pleasure doing things: Not at all (0) [3] : 2) Feeling down, depressed, or hopeless for nearly every day (3) [] : No [de-identified] : 2018- stent placement in leg- as per patient  [de-identified] : regular  [de-identified] : none [[id number]] : 3 [[id number]] : 9

## 2020-05-12 NOTE — HISTORY OF PRESENT ILLNESS
[FreeTextEntry1] : blood pressure and diabetes check. [de-identified] : 83 y.o female with PMHx significant for HTN, HLD, DM2, diabetic neuropathy, PAD s/p Right LE Stent (?), CKD, presenting for follow up of diabetes tanner elevated BP readings at home. Pt brought in Log book of BP readings for the past 2 months, readings ranging from 260/93 to 166/70. Blood sugars ranging from high 200s to high 100s.

## 2020-05-13 LAB
ALBUMIN SERPL ELPH-MCNC: 4.4 G/DL
ALP BLD-CCNC: 117 U/L
ALT SERPL-CCNC: 9 U/L
ANION GAP SERPL CALC-SCNC: 15 MMOL/L
AST SERPL-CCNC: 10 U/L
BILIRUB SERPL-MCNC: 0.3 MG/DL
BUN SERPL-MCNC: 40 MG/DL
CALCIUM SERPL-MCNC: 10.2 MG/DL
CHLORIDE SERPL-SCNC: 95 MMOL/L
CO2 SERPL-SCNC: 27 MMOL/L
CREAT SERPL-MCNC: 1.99 MG/DL
GLUCOSE SERPL-MCNC: 384 MG/DL
POTASSIUM SERPL-SCNC: 4.7 MMOL/L
PROT SERPL-MCNC: 7.3 G/DL
SODIUM SERPL-SCNC: 137 MMOL/L

## 2020-05-13 RX ORDER — HYDRALAZINE HYDROCHLORIDE 25 MG/1
25 TABLET ORAL
Qty: 270 | Refills: 1 | Status: DISCONTINUED | COMMUNITY
Start: 2020-04-30 | End: 2020-05-13

## 2020-05-14 ENCOUNTER — APPOINTMENT (OUTPATIENT)
Dept: HEMATOLOGY ONCOLOGY | Facility: CLINIC | Age: 85
End: 2020-05-14
Payer: MEDICARE

## 2020-05-14 PROCEDURE — 99441: CPT

## 2020-05-15 NOTE — HISTORY OF PRESENT ILLNESS
[de-identified] : Ms. Hastings is an 85 yo presenting for follow up  for anemia. She is [place] speaking. \par She is taking iron supplements. \par She has a history of diabetes and renal failure, secondary to diabetes. She follows with Dr. Strickland and .  [de-identified] : Patient tolerating Aranesp injections well. Patient states she feels much better after the injections. Patient has no acute complaints. Denies fever/chills, fatigue,nausea, vomiting, diarrhea,constipation,  abdominal pain, bleeding, easy bruising or visual changes, chest pain,  SOB or RUTH,  LE edema.\par \par CBC on 5/4/20\par Hg- 11.3\par Hct- 36.5\par Plt- 211\par

## 2020-05-15 NOTE — ASSESSMENT
[FreeTextEntry1] : Verbal consent given on 05/14/2020  and 10:56 am  by patient via .\par \par Spent 10 minutes on phone with patient and son utilizing . \par \par

## 2020-05-18 ENCOUNTER — APPOINTMENT (OUTPATIENT)
Dept: FAMILY MEDICINE | Facility: CLINIC | Age: 85
End: 2020-05-18
Payer: MEDICARE

## 2020-05-18 VITALS
DIASTOLIC BLOOD PRESSURE: 48 MMHG | RESPIRATION RATE: 14 BRPM | WEIGHT: 145 LBS | SYSTOLIC BLOOD PRESSURE: 100 MMHG | HEART RATE: 60 BPM | OXYGEN SATURATION: 97 % | BODY MASS INDEX: 26.68 KG/M2 | HEIGHT: 62 IN

## 2020-05-18 VITALS — DIASTOLIC BLOOD PRESSURE: 58 MMHG | SYSTOLIC BLOOD PRESSURE: 128 MMHG

## 2020-05-18 PROCEDURE — 99211 OFF/OP EST MAY X REQ PHY/QHP: CPT

## 2020-05-18 NOTE — HISTORY OF PRESENT ILLNESS
[FreeTextEntry1] : blood pressure check [de-identified] : Pt presents for blood pressure check, seen last week with elevated blood pressure, had not taken her medication that dy, told to come in today for monitoring. No new complains.

## 2020-05-18 NOTE — ASSESSMENT
[FreeTextEntry1] : Blood pressure vastly improved from last visit after taking medications this morning. Pt will  new monitor and will continue to monitor her blood pressure and report if consistently elevated readings. Will need to make an appointment in one month.

## 2020-06-10 ENCOUNTER — RX CHANGE (OUTPATIENT)
Age: 85
End: 2020-06-10

## 2020-06-10 RX ORDER — LEVOCETIRIZINE DIHYDROCHLORIDE 5 MG/1
5 TABLET ORAL DAILY
Qty: 30 | Refills: 0 | Status: DISCONTINUED | COMMUNITY
Start: 2020-05-13 | End: 2020-06-10

## 2020-06-11 ENCOUNTER — RX CHANGE (OUTPATIENT)
Age: 85
End: 2020-06-11

## 2020-06-15 ENCOUNTER — RESULT CHARGE (OUTPATIENT)
Age: 85
End: 2020-06-15

## 2020-06-15 ENCOUNTER — APPOINTMENT (OUTPATIENT)
Dept: FAMILY MEDICINE | Facility: CLINIC | Age: 85
End: 2020-06-15
Payer: MEDICARE

## 2020-06-15 VITALS
HEIGHT: 62 IN | DIASTOLIC BLOOD PRESSURE: 64 MMHG | WEIGHT: 136 LBS | BODY MASS INDEX: 25.03 KG/M2 | OXYGEN SATURATION: 98 % | HEART RATE: 66 BPM | SYSTOLIC BLOOD PRESSURE: 137 MMHG | TEMPERATURE: 98 F

## 2020-06-15 LAB — HBA1C MFR BLD HPLC: 8.9

## 2020-06-15 PROCEDURE — 83036 HEMOGLOBIN GLYCOSYLATED A1C: CPT | Mod: QW

## 2020-06-15 PROCEDURE — 99214 OFFICE O/P EST MOD 30 MIN: CPT | Mod: 25

## 2020-06-15 NOTE — HISTORY OF PRESENT ILLNESS
[FreeTextEntry1] : blood pressure check [de-identified] : 83 y.o female with PMHx significant for HTN, HLD, DM2, diabetic neuropathy, PAD s/p Right LE Stent (?), CKD, presenting for follow up of diabetes and BP. Pt brought in Log book of BP readings for the past 2 months, readings ranging between 150s and 180s,. Blood sugars ranging from high 140s to 160s.

## 2020-06-15 NOTE — PHYSICAL EXAM
[Normal Rate] : normal rate  [Regular Rhythm] : with a regular rhythm [Normal S1, S2] : normal S1 and S2 [No Edema] : there was no peripheral edema [Normal] : soft, non-tender, non-distended, no masses palpated, no HSM and normal bowel sounds [de-identified] : TOSHIA 2-3/6

## 2020-06-15 NOTE — COUNSELING
[Fall prevention counseling provided] : Fall prevention counseling provided [Adequate lighting] : Adequate lighting [Use proper foot wear] : Use proper foot wear [AUDIT-C Screening administered and reviewed] : AUDIT-C Screening administered and reviewed [Encouraged to increase physical activity] : Encouraged to increase physical activity [____ min/wk Activity] : [unfilled] min/wk activity [None] : None [Good understanding] : Patient has a good understanding of lifestyle changes and steps needed to achieve self management goal

## 2020-06-15 NOTE — HEALTH RISK ASSESSMENT
[Intercurrent hospitalizations] : was admitted to the hospital  [No] : In the past 12 months have you used drugs other than those required for medical reasons? No [No falls in past year] : Patient reported no falls in the past year [0] : 1) Little interest or pleasure doing things: Not at all (0) [3] : 2) Feeling down, depressed, or hopeless for nearly every day (3) [] : No [de-identified] : 2018- stent placement in leg- as per patient  [de-identified] : none [de-identified] : regular  [[id number]] : 3 [[id number]] : 9

## 2020-06-15 NOTE — ASSESSMENT
[FreeTextEntry1] : 83 y.o female with PMHx significant for HTN, HLD, DM2, diabetic neuropathy, PAD s/p Right LE Stent (?), presenting for follow up of diabetes, high blood pressure.\par \par RENAL: CKD Stage 5\par -Stable, last Cr 5/20 1.99\par -Renal US with Medical Renal Disease\par -Nephrology Dr Marco A zheng.\par -Avoid Nephrotoxins\par \par ENDOCRINE/NEUROLOGY: h/o T2DM, diabetic neuropathy.\par -Last HgA1c 10.7, today 8.9  in house.\par -Currently on Glipizide, Januvia, Jardiance 10 mg (renally dosed)\par -Recommend fasting BS <130.\par -Moderate exercise recommended\par -Dietary changes discussed.\par -Continue Gabapentin 200 mg at bedtime due to poor Cr clearance.\par -Diabetic eye exam July 2019, referral given today..\par \par CVS: h/o HTN, HLD, PAD s/p stent to RLE for severe right leg claudication to right tibio-Peroneal and balloon Angioplasty to Right posterior tibial and right anterior tibial.\par -Cardiology - [facility], Dr D'Agate.\par -Stent placed by Dr Anurag Mcgee.\par -Blood pressure improved.\par -RTO in 1 month for BP check.\par -Continue Carvedilol, Clonidine, Hydralazine, Simvastatin, Clopidogrel.\par -Moderate exercise recommended.\par -NST 4/30/19 negative for Ischemia\par -Carotid duplex Mild-Mod 16-49% stenosis of Right bulb and pRICA. Mild to Mod 16-49% stenosis of the left Bulb and pLICA\par -2D Echo (5/20/19) EF 65-70%, normal LVSF, mild to mod AV stenosis.\par \par GI PPx- Continue Famotidine\par \par HEME: Anemia of chronic disease\par -Stable, no longer receiving injections.\par \par NEURO: Memory difficulties. \par -Continue Namenda 5 mg daily.\par \par HCM:\par -Will request records from previous Dr Alena Cornejo PMD RE: immunizations\par -Ophthalmology referral given, no appointment set up yet.\par -Received Prevnar 13 in office Nov 2019\par -Reports Flu vaccine at local pharmacy  in Oct 2019\par -RTO in 1 month for BP check

## 2020-06-23 ENCOUNTER — OUTPATIENT (OUTPATIENT)
Dept: OUTPATIENT SERVICES | Facility: HOSPITAL | Age: 85
LOS: 1 days | Discharge: HOME | End: 2020-06-23

## 2020-06-23 DIAGNOSIS — Z86.69 PERSONAL HISTORY OF OTHER DISEASES OF THE NERVOUS SYSTEM AND SENSE ORGANS: Chronic | ICD-10-CM

## 2020-06-23 DIAGNOSIS — N18.5 CHRONIC KIDNEY DISEASE, STAGE 5: ICD-10-CM

## 2020-06-23 DIAGNOSIS — D63.1 ANEMIA IN CHRONIC KIDNEY DISEASE: ICD-10-CM

## 2020-06-29 ENCOUNTER — RESULT REVIEW (OUTPATIENT)
Age: 85
End: 2020-06-29

## 2020-06-29 ENCOUNTER — APPOINTMENT (OUTPATIENT)
Age: 85
End: 2020-06-29

## 2020-06-29 LAB
BASOPHILS # BLD AUTO: 0 K/UL — SIGNIFICANT CHANGE UP (ref 0–0.2)
BASOPHILS NFR BLD AUTO: 0.5 % — SIGNIFICANT CHANGE UP (ref 0–2)
EOSINOPHIL # BLD AUTO: 0.3 K/UL — SIGNIFICANT CHANGE UP (ref 0–0.5)
EOSINOPHIL NFR BLD AUTO: 3.1 % — SIGNIFICANT CHANGE UP (ref 0–6)
HCT VFR BLD CALC: 23.8 % — LOW (ref 34.5–45)
HGB BLD-MCNC: 7.8 G/DL — LOW (ref 11.5–15.5)
LYMPHOCYTES # BLD AUTO: 1.3 K/UL — SIGNIFICANT CHANGE UP (ref 1–3.3)
LYMPHOCYTES # BLD AUTO: 15.9 % — SIGNIFICANT CHANGE UP (ref 13–44)
MCHC RBC-ENTMCNC: 29 PG — SIGNIFICANT CHANGE UP (ref 27–34)
MCHC RBC-ENTMCNC: 32.5 G/DL — SIGNIFICANT CHANGE UP (ref 32–36)
MCV RBC AUTO: 89.1 FL — SIGNIFICANT CHANGE UP (ref 80–100)
MONOCYTES # BLD AUTO: 0.7 K/UL — SIGNIFICANT CHANGE UP (ref 0–0.9)
MONOCYTES NFR BLD AUTO: 8.1 % — SIGNIFICANT CHANGE UP (ref 2–14)
NEUTROPHILS # BLD AUTO: 6 K/UL — SIGNIFICANT CHANGE UP (ref 1.8–7.4)
NEUTROPHILS NFR BLD AUTO: 72.4 % — SIGNIFICANT CHANGE UP (ref 43–77)
PLATELET # BLD AUTO: 252 K/UL — SIGNIFICANT CHANGE UP (ref 150–400)
RBC # BLD: 2.68 M/UL — LOW (ref 3.8–5.2)
RBC # FLD: 18.5 % — HIGH (ref 10.3–14.5)
WBC # BLD: 8.3 K/UL — SIGNIFICANT CHANGE UP (ref 3.8–10.5)
WBC # FLD AUTO: 8.3 K/UL — SIGNIFICANT CHANGE UP (ref 3.8–10.5)

## 2020-07-06 ENCOUNTER — RX RENEWAL (OUTPATIENT)
Age: 85
End: 2020-07-06

## 2020-07-13 ENCOUNTER — APPOINTMENT (OUTPATIENT)
Dept: FAMILY MEDICINE | Facility: CLINIC | Age: 85
End: 2020-07-13
Payer: MEDICARE

## 2020-07-13 VITALS
WEIGHT: 135 LBS | TEMPERATURE: 98.1 F | BODY MASS INDEX: 24.84 KG/M2 | HEART RATE: 64 BPM | DIASTOLIC BLOOD PRESSURE: 64 MMHG | SYSTOLIC BLOOD PRESSURE: 130 MMHG | HEIGHT: 62 IN | OXYGEN SATURATION: 96 %

## 2020-07-13 DIAGNOSIS — M54.2 CERVICALGIA: ICD-10-CM

## 2020-07-13 PROCEDURE — 99214 OFFICE O/P EST MOD 30 MIN: CPT

## 2020-07-13 NOTE — COUNSELING
[Fall prevention counseling provided] : Fall prevention counseling provided [Adequate lighting] : Adequate lighting [Use proper foot wear] : Use proper foot wear [AUDIT-C Screening administered and reviewed] : AUDIT-C Screening administered and reviewed [____ min/wk Activity] : [unfilled] min/wk activity [Encouraged to increase physical activity] : Encouraged to increase physical activity [Good understanding] : Patient has a good understanding of lifestyle changes and steps needed to achieve self management goal [None] : None

## 2020-07-13 NOTE — HISTORY OF PRESENT ILLNESS
[de-identified] : 83 y.o female with PMHx significant for HTN, HLD, DM2, diabetic neuropathy, PAD s/p Right LE Stent (?), CKD, presenting for follow up of  BP. Pt states that her BP has improved but her sugars continue to be "up and down", log book shows BS ranging 160s-190s the last 2 weeks, previously up in the 200s. Pt awaiting to be able to go back to [facility] where she would reside. [FreeTextEntry1] : Blood Pressure.

## 2020-07-13 NOTE — REVIEW OF SYSTEMS
[Muscle Pain] : muscle pain [Negative] : Integumentary [Headache] : no headache [Confusion] : no confusion [FreeTextEntry9] : neck pain. [Unsteady Walking] : no ataxia

## 2020-07-13 NOTE — HEALTH RISK ASSESSMENT
[No] : In the past 12 months have you used drugs other than those required for medical reasons? No [No falls in past year] : Patient reported no falls in the past year [] : No [de-identified] : walking [de-identified] : Low Carbs.

## 2020-07-13 NOTE — PHYSICAL EXAM
[No JVD] : no jugular venous distention [Normal] : no rash [Normal Affect] : the affect was normal [Normal Insight/Judgement] : insight and judgment were intact [de-identified] : C-spinal tenderness at C5-C6, C6-C7

## 2020-07-13 NOTE — ASSESSMENT
[FreeTextEntry1] : 84 y.o female with PMHx significant for HTN, HLD, DM2, diabetic neuropathy, PAD s/p Right LE Stent (?), presenting for follow up of blood pressure, now c/o neck pain.\par \par MSK: Cervicalgia\par -C-spine Xrays.\par -If Xrays normal, may give P.T. referral.\par \par RENAL: CKD Stage 5\par -Stable, last Cr 5/20 1.99\par -Renal US with Medical Renal Disease\par -Nephrology Dr Strickland following.\par -Avoid Nephrotoxins\par \par ENDOCRINE/NEUROLOGY: h/o T2DM, diabetic neuropathy.\par -Last HgA1c 8.9  in house.\par -Currently on Glipizide, Januvia, Jardiance 10 mg (renally dosed)\par -Recommend fasting BS <130.\par -Moderate exercise recommended\par -Dietary changes discussed.\par -Continue Gabapentin 200 mg at bedtime due to poor Cr clearance.\par -Diabetic eye exam July 2019, referral given not done yet.\par \par CVS: h/o HTN, HLD, PAD s/p stent to RLE for severe right leg claudication to right tibio-Peroneal and balloon Angioplasty to Right posterior tibial and right anterior tibial.\par -Cardiology - [facility], Dr D'Agate.\par -Stent placed by Dr Anurag Mcgee.\par -Blood pressure improved.\par -BP well controlled.\par -Continue Carvedilol, Clonidine, Hydralazine, Simvastatin, Clopidogrel.\par -Moderate exercise recommended.\par -NST 4/30/19 negative for Ischemia\par -Carotid duplex Mild-Mod 16-49% stenosis of Right bulb and pRICA. Mild to Mod 16-49% stenosis of the left Bulb and pLICA\par -2D Echo (5/20/19) EF 65-70%, normal LVSF, mild to mod AV stenosis.\par \par GI PPx- Continue Famotidine\par \par HEME: Anemia of chronic disease\par -Stable, re-start Aranesp q3 weeks..\par \par NEURO: Memory difficulties. \par -Continue Namenda 5 mg daily.\par \par HCM:\par -Will request records from previous Dr Alena Cornejo PMD RE: immunizations\par -Ophthalmology referral given, no appointment set up yet.\par -Received Prevnar 13 in office Nov 2019\par -Reports Flu vaccine at local pharmacy  in Oct 2019\par -RTO in 2 months for diabetes check.

## 2020-07-20 ENCOUNTER — RESULT REVIEW (OUTPATIENT)
Age: 85
End: 2020-07-20

## 2020-07-20 ENCOUNTER — APPOINTMENT (OUTPATIENT)
Age: 85
End: 2020-07-20

## 2020-07-20 LAB
BASOPHILS # BLD AUTO: 0 K/UL — SIGNIFICANT CHANGE UP (ref 0–0.2)
BASOPHILS NFR BLD AUTO: 0.4 % — SIGNIFICANT CHANGE UP (ref 0–2)
EOSINOPHIL # BLD AUTO: 0.3 K/UL — SIGNIFICANT CHANGE UP (ref 0–0.5)
EOSINOPHIL NFR BLD AUTO: 4.4 % — SIGNIFICANT CHANGE UP (ref 0–6)
HCT VFR BLD CALC: 33.4 % — LOW (ref 34.5–45)
HGB BLD-MCNC: 10.8 G/DL — LOW (ref 11.5–15.5)
LYMPHOCYTES # BLD AUTO: 1.2 K/UL — SIGNIFICANT CHANGE UP (ref 1–3.3)
LYMPHOCYTES # BLD AUTO: 16.8 % — SIGNIFICANT CHANGE UP (ref 13–44)
MCHC RBC-ENTMCNC: 30.4 PG — SIGNIFICANT CHANGE UP (ref 27–34)
MCHC RBC-ENTMCNC: 32.3 G/DL — SIGNIFICANT CHANGE UP (ref 32–36)
MCV RBC AUTO: 94.3 FL — SIGNIFICANT CHANGE UP (ref 80–100)
MONOCYTES # BLD AUTO: 0.6 K/UL — SIGNIFICANT CHANGE UP (ref 0–0.9)
MONOCYTES NFR BLD AUTO: 8.5 % — SIGNIFICANT CHANGE UP (ref 2–14)
NEUTROPHILS # BLD AUTO: 4.8 K/UL — SIGNIFICANT CHANGE UP (ref 1.8–7.4)
NEUTROPHILS NFR BLD AUTO: 69.8 % — SIGNIFICANT CHANGE UP (ref 43–77)
PLATELET # BLD AUTO: 190 K/UL — SIGNIFICANT CHANGE UP (ref 150–400)
RBC # BLD: 3.55 M/UL — LOW (ref 3.8–5.2)
RBC # FLD: 15 % — HIGH (ref 10.3–14.5)
WBC # BLD: 6.8 K/UL — SIGNIFICANT CHANGE UP (ref 3.8–10.5)
WBC # FLD AUTO: 6.8 K/UL — SIGNIFICANT CHANGE UP (ref 3.8–10.5)

## 2020-08-03 ENCOUNTER — OUTPATIENT (OUTPATIENT)
Dept: OUTPATIENT SERVICES | Facility: HOSPITAL | Age: 85
LOS: 1 days | Discharge: HOME | End: 2020-08-03

## 2020-08-03 DIAGNOSIS — D63.1 ANEMIA IN CHRONIC KIDNEY DISEASE: ICD-10-CM

## 2020-08-03 DIAGNOSIS — Z86.69 PERSONAL HISTORY OF OTHER DISEASES OF THE NERVOUS SYSTEM AND SENSE ORGANS: Chronic | ICD-10-CM

## 2020-08-06 ENCOUNTER — APPOINTMENT (OUTPATIENT)
Dept: HEMATOLOGY ONCOLOGY | Facility: CLINIC | Age: 85
End: 2020-08-06

## 2020-08-10 ENCOUNTER — APPOINTMENT (OUTPATIENT)
Age: 85
End: 2020-08-10

## 2020-08-18 ENCOUNTER — RX RENEWAL (OUTPATIENT)
Age: 85
End: 2020-08-18

## 2020-08-31 ENCOUNTER — APPOINTMENT (OUTPATIENT)
Dept: HEMATOLOGY ONCOLOGY | Facility: CLINIC | Age: 85
End: 2020-08-31

## 2020-08-31 ENCOUNTER — APPOINTMENT (OUTPATIENT)
Age: 85
End: 2020-08-31

## 2020-10-06 ENCOUNTER — APPOINTMENT (OUTPATIENT)
Dept: FAMILY MEDICINE | Facility: CLINIC | Age: 85
End: 2020-10-06
Payer: MEDICARE

## 2020-10-06 ENCOUNTER — RESULT CHARGE (OUTPATIENT)
Age: 85
End: 2020-10-06

## 2020-10-06 ENCOUNTER — RX RENEWAL (OUTPATIENT)
Age: 85
End: 2020-10-06

## 2020-10-06 VITALS
HEART RATE: 55 BPM | HEIGHT: 62 IN | SYSTOLIC BLOOD PRESSURE: 110 MMHG | WEIGHT: 137 LBS | BODY MASS INDEX: 25.21 KG/M2 | OXYGEN SATURATION: 98 % | DIASTOLIC BLOOD PRESSURE: 70 MMHG | TEMPERATURE: 96.3 F | RESPIRATION RATE: 14 BRPM

## 2020-10-06 DIAGNOSIS — R26.81 UNSTEADINESS ON FEET: ICD-10-CM

## 2020-10-06 PROCEDURE — 83036 HEMOGLOBIN GLYCOSYLATED A1C: CPT | Mod: QW

## 2020-10-06 PROCEDURE — 90662 IIV NO PRSV INCREASED AG IM: CPT

## 2020-10-06 PROCEDURE — 99214 OFFICE O/P EST MOD 30 MIN: CPT | Mod: 25

## 2020-10-06 PROCEDURE — G0008: CPT

## 2020-10-07 ENCOUNTER — EMERGENCY (EMERGENCY)
Facility: HOSPITAL | Age: 85
LOS: 1 days | Discharge: HOME | End: 2020-10-07
Attending: EMERGENCY MEDICINE
Payer: MEDICARE

## 2020-10-07 VITALS
RESPIRATION RATE: 18 BRPM | SYSTOLIC BLOOD PRESSURE: 125 MMHG | OXYGEN SATURATION: 95 % | WEIGHT: 167.99 LBS | DIASTOLIC BLOOD PRESSURE: 47 MMHG | HEART RATE: 68 BPM | TEMPERATURE: 98 F | HEIGHT: 61 IN

## 2020-10-07 VITALS
RESPIRATION RATE: 17 BRPM | SYSTOLIC BLOOD PRESSURE: 192 MMHG | DIASTOLIC BLOOD PRESSURE: 61 MMHG | OXYGEN SATURATION: 97 % | TEMPERATURE: 98 F | HEART RATE: 55 BPM

## 2020-10-07 DIAGNOSIS — Z86.69 PERSONAL HISTORY OF OTHER DISEASES OF THE NERVOUS SYSTEM AND SENSE ORGANS: Chronic | ICD-10-CM

## 2020-10-07 LAB
ABO RH CONFIRMATION: SIGNIFICANT CHANGE UP
ACETONE SERPL-MCNC: NEGATIVE — SIGNIFICANT CHANGE UP
ALBUMIN SERPL ELPH-MCNC: 4 G/DL — SIGNIFICANT CHANGE UP (ref 3.3–5.2)
ALP SERPL-CCNC: 100 U/L — SIGNIFICANT CHANGE UP (ref 40–120)
ALT FLD-CCNC: 10 U/L — SIGNIFICANT CHANGE UP
ANION GAP SERPL CALC-SCNC: 18 MMOL/L — HIGH (ref 5–17)
ANISOCYTOSIS BLD QL: SLIGHT — SIGNIFICANT CHANGE UP
APPEARANCE UR: CLEAR — SIGNIFICANT CHANGE UP
APTT BLD: 30.6 SEC — SIGNIFICANT CHANGE UP (ref 27.5–35.5)
AST SERPL-CCNC: 9 U/L — SIGNIFICANT CHANGE UP
BACTERIA # UR AUTO: ABNORMAL
BASOPHILS # BLD AUTO: 0.01 K/UL — SIGNIFICANT CHANGE UP (ref 0–0.2)
BASOPHILS NFR BLD AUTO: 0.2 % — SIGNIFICANT CHANGE UP (ref 0–2)
BILIRUB SERPL-MCNC: 0.2 MG/DL — LOW (ref 0.4–2)
BILIRUB UR-MCNC: NEGATIVE — SIGNIFICANT CHANGE UP
BLD GP AB SCN SERPL QL: SIGNIFICANT CHANGE UP
BUN SERPL-MCNC: 72 MG/DL — HIGH (ref 8–20)
CALCIUM SERPL-MCNC: 9.9 MG/DL — SIGNIFICANT CHANGE UP (ref 8.6–10.2)
CHLORIDE SERPL-SCNC: 98 MMOL/L — SIGNIFICANT CHANGE UP (ref 98–107)
CO2 SERPL-SCNC: 23 MMOL/L — SIGNIFICANT CHANGE UP (ref 22–29)
COLOR SPEC: YELLOW — SIGNIFICANT CHANGE UP
CREAT SERPL-MCNC: 2.59 MG/DL — HIGH (ref 0.5–1.3)
DIFF PNL FLD: NEGATIVE — SIGNIFICANT CHANGE UP
EOSINOPHIL # BLD AUTO: 0.27 K/UL — SIGNIFICANT CHANGE UP (ref 0–0.5)
EOSINOPHIL NFR BLD AUTO: 4.2 % — SIGNIFICANT CHANGE UP (ref 0–6)
EPI CELLS # UR: SIGNIFICANT CHANGE UP
FERRITIN SERPL-MCNC: 5 NG/ML — LOW (ref 15–150)
GAS PNL BLDV: SIGNIFICANT CHANGE UP
GLUCOSE SERPL-MCNC: 246 MG/DL — HIGH (ref 70–99)
GLUCOSE UR QL: 1000 MG/DL
HCO3 BLDV-SCNC: 26 MMOL/L — SIGNIFICANT CHANGE UP (ref 21–29)
HCT VFR BLD CALC: 23.5 % — LOW (ref 34.5–45)
HGB BLD-MCNC: 7.1 G/DL — LOW (ref 11.5–15.5)
HYPOCHROMIA BLD QL: SLIGHT — SIGNIFICANT CHANGE UP
IMM GRANULOCYTES NFR BLD AUTO: 0.3 % — SIGNIFICANT CHANGE UP (ref 0–1.5)
INR BLD: 1.02 RATIO — SIGNIFICANT CHANGE UP (ref 0.88–1.16)
IRON SATN MFR SERPL: 19 UG/DL — LOW (ref 37–145)
IRON SATN MFR SERPL: 4 % — LOW (ref 14–50)
KETONES UR-MCNC: NEGATIVE — SIGNIFICANT CHANGE UP
LEUKOCYTE ESTERASE UR-ACNC: ABNORMAL
LYMPHOCYTES # BLD AUTO: 0.97 K/UL — LOW (ref 1–3.3)
LYMPHOCYTES # BLD AUTO: 14.9 % — SIGNIFICANT CHANGE UP (ref 13–44)
MANUAL SMEAR VERIFICATION: SIGNIFICANT CHANGE UP
MCHC RBC-ENTMCNC: 23.1 PG — LOW (ref 27–34)
MCHC RBC-ENTMCNC: 28.9 GM/DL — LOW (ref 32–36)
MCV RBC AUTO: 79.9 FL — LOW (ref 80–100)
MONOCYTES # BLD AUTO: 0.58 K/UL — SIGNIFICANT CHANGE UP (ref 0–0.9)
MONOCYTES NFR BLD AUTO: 8.9 % — SIGNIFICANT CHANGE UP (ref 2–14)
NEUTROPHILS # BLD AUTO: 4.65 K/UL — SIGNIFICANT CHANGE UP (ref 1.8–7.4)
NEUTROPHILS NFR BLD AUTO: 71.5 % — SIGNIFICANT CHANGE UP (ref 43–77)
NITRITE UR-MCNC: NEGATIVE — SIGNIFICANT CHANGE UP
OB PNL STL: NEGATIVE — SIGNIFICANT CHANGE UP
PCO2 BLDV: 48 MMHG — SIGNIFICANT CHANGE UP (ref 35–50)
PH BLDV: 7.36 — SIGNIFICANT CHANGE UP (ref 7.32–7.43)
PH UR: 6 — SIGNIFICANT CHANGE UP (ref 5–8)
PLAT MORPH BLD: NORMAL — SIGNIFICANT CHANGE UP
PLATELET # BLD AUTO: 370 K/UL — SIGNIFICANT CHANGE UP (ref 150–400)
PO2 BLDV: 61 MMHG — HIGH (ref 25–45)
POTASSIUM SERPL-MCNC: 3.6 MMOL/L — SIGNIFICANT CHANGE UP (ref 3.5–5.3)
POTASSIUM SERPL-SCNC: 3.6 MMOL/L — SIGNIFICANT CHANGE UP (ref 3.5–5.3)
PROT SERPL-MCNC: 7.1 G/DL — SIGNIFICANT CHANGE UP (ref 6.6–8.7)
PROT UR-MCNC: 15 MG/DL
PROTHROM AB SERPL-ACNC: 11.8 SEC — SIGNIFICANT CHANGE UP (ref 10.6–13.6)
RBC # BLD: 2.94 M/UL — LOW (ref 3.8–5.2)
RBC # FLD: 17.7 % — HIGH (ref 10.3–14.5)
RBC BLD AUTO: ABNORMAL
RBC CASTS # UR COMP ASSIST: SIGNIFICANT CHANGE UP /HPF (ref 0–4)
SAO2 % BLDV: 90 % — SIGNIFICANT CHANGE UP
SODIUM SERPL-SCNC: 139 MMOL/L — SIGNIFICANT CHANGE UP (ref 135–145)
SP GR SPEC: 1.01 — SIGNIFICANT CHANGE UP (ref 1.01–1.02)
TIBC SERPL-MCNC: 498 UG/DL — HIGH (ref 220–430)
TRANSFERRIN SERPL-MCNC: 348 MG/DL — SIGNIFICANT CHANGE UP (ref 192–382)
UROBILINOGEN FLD QL: NEGATIVE MG/DL — SIGNIFICANT CHANGE UP
WBC # BLD: 6.5 K/UL — SIGNIFICANT CHANGE UP (ref 3.8–10.5)
WBC # FLD AUTO: 6.5 K/UL — SIGNIFICANT CHANGE UP (ref 3.8–10.5)
WBC UR QL: SIGNIFICANT CHANGE UP

## 2020-10-07 PROCEDURE — 86901 BLOOD TYPING SEROLOGIC RH(D): CPT

## 2020-10-07 PROCEDURE — 86850 RBC ANTIBODY SCREEN: CPT

## 2020-10-07 PROCEDURE — 99218: CPT

## 2020-10-07 PROCEDURE — 99285 EMERGENCY DEPT VISIT HI MDM: CPT | Mod: 25

## 2020-10-07 PROCEDURE — 82272 OCCULT BLD FECES 1-3 TESTS: CPT

## 2020-10-07 PROCEDURE — 80053 COMPREHEN METABOLIC PANEL: CPT

## 2020-10-07 PROCEDURE — 85730 THROMBOPLASTIN TIME PARTIAL: CPT

## 2020-10-07 PROCEDURE — T1013: CPT

## 2020-10-07 PROCEDURE — 84466 ASSAY OF TRANSFERRIN: CPT

## 2020-10-07 PROCEDURE — 81001 URINALYSIS AUTO W/SCOPE: CPT

## 2020-10-07 PROCEDURE — 82803 BLOOD GASES ANY COMBINATION: CPT

## 2020-10-07 PROCEDURE — 86923 COMPATIBILITY TEST ELECTRIC: CPT

## 2020-10-07 PROCEDURE — G0378: CPT

## 2020-10-07 PROCEDURE — 96374 THER/PROPH/DIAG INJ IV PUSH: CPT

## 2020-10-07 PROCEDURE — 83540 ASSAY OF IRON: CPT

## 2020-10-07 PROCEDURE — 83550 IRON BINDING TEST: CPT

## 2020-10-07 PROCEDURE — 82009 KETONE BODYS QUAL: CPT

## 2020-10-07 PROCEDURE — 85025 COMPLETE CBC W/AUTO DIFF WBC: CPT

## 2020-10-07 PROCEDURE — 86900 BLOOD TYPING SEROLOGIC ABO: CPT

## 2020-10-07 PROCEDURE — 85610 PROTHROMBIN TIME: CPT

## 2020-10-07 PROCEDURE — 36430 TRANSFUSION BLD/BLD COMPNT: CPT

## 2020-10-07 PROCEDURE — P9016: CPT

## 2020-10-07 PROCEDURE — 82728 ASSAY OF FERRITIN: CPT

## 2020-10-07 PROCEDURE — 36415 COLL VENOUS BLD VENIPUNCTURE: CPT

## 2020-10-07 PROCEDURE — 83036 HEMOGLOBIN GLYCOSYLATED A1C: CPT

## 2020-10-07 RX ORDER — METOPROLOL TARTRATE 50 MG
5 TABLET ORAL ONCE
Refills: 0 | Status: COMPLETED | OUTPATIENT
Start: 2020-10-07 | End: 2020-10-07

## 2020-10-07 RX ORDER — CARVEDILOL PHOSPHATE 80 MG/1
12.5 CAPSULE, EXTENDED RELEASE ORAL ONCE
Refills: 0 | Status: COMPLETED | OUTPATIENT
Start: 2020-10-07 | End: 2020-10-07

## 2020-10-07 RX ADMIN — Medication 5 MILLIGRAM(S): at 21:08

## 2020-10-07 RX ADMIN — CARVEDILOL PHOSPHATE 12.5 MILLIGRAM(S): 80 CAPSULE, EXTENDED RELEASE ORAL at 19:29

## 2020-10-07 NOTE — ED STATDOCS - PROGRESS NOTE DETAILS
pt is seen by Dr shell initially agreed with hx , PE and plan  Pt hx of CKD under and anemia  f.u With Dr Myers out pt . pt has no endo . pt called back by pcp for HGB 6.9 , HGB is 7.1 in ER negative fecal occult . pt is been explained the blood transfusion reaction . consent signed . alexa shell 1 unit of PRBC ordered. consult Endo since pt has no endocrinology called for consult

## 2020-10-07 NOTE — ED ADULT NURSE REASSESSMENT NOTE - NS ED NURSE REASSESS COMMENT FT1
Patient received from RN at 1930. AOx4, [place]ian speaking only. daughter at bedside. denies any current pain or discomfort. patient is currently receiving 1 unit of PRBC. BP elevated, PA aware and coreg given. patent IV in LAC #20. Ambulate with 1-assist. safety maintained, will continue to monitor

## 2020-10-07 NOTE — ED ADULT TRIAGE NOTE - CHIEF COMPLAINT QUOTE
pt sent from dr edwards, [place] Hastings of [place] for abnormal lab value. unknown lab or value. pt was seen in Dr. Hastings office for elevated glucose. pt with no medical complaints at this time

## 2020-10-07 NOTE — ED CDU PROVIDER DISPOSITION NOTE - PATIENT PORTAL LINK FT
You can access the FollowMyHealth Patient Portal offered by [facility] by registering at the following website: [email or web address]. By joining [employer]’s FollowMyHealth portal, you will also be able to view your health information using other applications (apps) compatible with our system.

## 2020-10-07 NOTE — ED ADULT NURSE REASSESSMENT NOTE - NS ED NURSE REASSESS COMMENT FT1
Patient safely discharged home. Discharge paperwork given to patient and daughter. IV catheter removed from LAC #20. Wheelchair provided.

## 2020-10-07 NOTE — ED CDU PROVIDER DISPOSITION NOTE - CLINICAL COURSE
83 y/o female with PMHx of Anemia, Cardiomegaly, HTN, HLD, and DM on Glipizide 10mg, and Losartan presents to ED c/o abnormal lab result. Patients daughter states patient was sent to the ED by her PMD for elevated blood sugars. Patient had outpatient blood work, and found to have a Hemoglobin 6.9. Has a hx of Anemia, taken meds in the past for it, but has never needed a blood transfusion. obtain 1 unit of PRBC, BP has improved, Lungs CTA bilaterally, s1, s2 present, RRR, Pt reassessed, pt feeling better at this time, vss, pt able to walk, talk and vocalized plan of action. Discussed in depth and explained to pt in depth the next steps that need to be taking including proper follow up with PCP or specialists. All incidental findings were discussed with pt as well. Pt verbalized their concerns and all questions were answered. Pt understands dispo and wants discharge. Given good instructions when to return to ED and importance of f/u.

## 2020-10-07 NOTE — ED STATDOCS - CLINICAL SUMMARY MEDICAL DECISION MAKING FREE TEXT BOX
Brown stool in rectal vault with hx of iron deficiency, makes iron deficiency anemia likely. Check occult blood, iron level, and transfuse if below 7. Asymptomatic hyperglycemia, evaluate for potential signs of DKA.

## 2020-10-07 NOTE — ED STATDOCS - OBJECTIVE STATEMENT
83 y/o female with PMHx of Anemia, Cardiomegaly, HTN, HLD, and DM on Glipizide 10mg, and Losartan presents to ED c/o abnormal lab result. Patients daughter states patient was sent to the ED by her PMD for elevated blood sugars. Patient had outpatient blood work, and found to have a Hemoglobin 6.9. Has a hx of Anemia, taken meds in the past for it, but has never needed a blood transfusion. Today's blood sugar today was 223.     Denies CP, SOB, blood in stool, black stools  : Becky

## 2020-10-07 NOTE — ED CDU PROVIDER INITIAL DAY NOTE - PROGRESS NOTE DETAILS
POLO- pt signed out to me by FRANC Horne, obtain 1 unit of PRBC, BP has improved, Lungs CTA bilaterally, s1, s2 present, RRR, Pt reassessed, pt feeling better at this time, vss, pt able to walk, talk and vocalized plan of action. Discussed in depth and explained to pt in depth the next steps that need to be taking including proper follow up with PCP or specialists. All incidental findings were discussed with pt as well. Pt verbalized their concerns and all questions were answered. Pt understands dispo and wants discharge. Given good instructions when to return to ED and importance of f/u.

## 2020-10-07 NOTE — ED CDU PROVIDER DISPOSITION NOTE - ATTENDING CONTRIBUTION TO CARE
83 y/o female with PMHx of Anemia. Pt. admitted to observation for blood transfusion. I, Dr. Goldsmith, performed a face to face bedside interview with this patient regarding history of present illness, review of symptoms and relevant past medical, social and family history.  I completed an independent physical examination.  I have also reviewed the ACP's note(s) and discussed the plan with the ACP.

## 2020-10-07 NOTE — ED CDU PROVIDER INITIAL DAY NOTE - OBJECTIVE STATEMENT
See original HPI:    83 y/o female with PMHx of Anemia, Cardiomegaly, HTN, HLD, and DM on Glipizide 10mg, and Losartan presents to ED c/o abnormal lab result. Patients daughter states patient was sent to the ED by her PMD for elevated blood sugars. Patient had outpatient blood work, and found to have a Hemoglobin 6.9. Has a hx of Anemia, taken meds in the past for it, but has never needed a blood transfusion. Today's blood sugar today was 223.

## 2020-10-07 NOTE — ED STATDOCS - ATTENDING CONTRIBUTION TO CARE
I, Iglesia Tellez, performed the initial face to face bedside interview with this patient regarding history of present illness, review of symptoms and relevant past medical, social and family history.  I completed an independent physical examination.  I was the initial provider who evaluated this patient. I have signed out the follow up of any pending tests (i.e. labs, radiological studies) to the ACP.  I have communicated the patient’s plan of care and disposition with the ACP.

## 2020-10-07 NOTE — ED CDU PROVIDER INITIAL DAY NOTE - ATTENDING CONTRIBUTION TO CARE
HPI: 83yo F with h/o chronic kidney disease, DM, HTN, anemia, presenting to ED with incidental lab finding- Hb 6.9. Patient has been having elevated glucose at home, had labs performed as outpatient which showed abnormal H/H. Denies black/bloody stools. Follows with Dr. Myers (Hem/onc) and Dr. Strickland (renal). Denies CP/SOB.     PE:  Gen: NAD  Head: NCAT  HEENT: Oral mucosa moist, normal conjunctiva  CV: murmurs, normal perfusion  Resp: no respiratory distress  GI: Abd Soft NTND  Neuro: No focal neuro deficits  MSK: FROM all 4 extremities, no deformity  Skin: No rash, no bruising  Psych: Normal affect    MDM: Pt with incidental finding of anemia- Hb 7.1 here, will transfuse 1 unit prbc, reassess. Hyperglycemia- pt not in DKA, HbA1c 9, dc home with endocrine f/u. Emilia Lizarraga DO     I performed a history and physical exam of the patient and discussed their management with the advanced care provider. I reviewed the advanced care provider's note and agree with the documented findings and plan of care. My medical decision making and objective findings are found above. HPI: 83yo F with h/o chronic kidney disease, DM, HTN, anemia, presenting to ED with incidental lab finding- Hb 6.9. Patient has been having elevated glucose at home, had labs performed as outpatient which showed abnormal H/H. Denies black/bloody stools. Follows with Dr. Myers (Hem/onc) and Dr. Strickland (renal). Denies CP/SOB.     PE:  Gen: NAD  Head: NCAT  HEENT: Oral mucosa moist, normal conjunctiva  CV: normal perfusion  Resp: no respiratory distress  Neuro: No focal neuro deficits  MSK: FROM all 4 extremities, no deformity  Skin: No rash, no bruising  Psych: Normal affect    MDM: Pt with incidental finding of anemia- Hb 7.1 here, will transfuse 1 unit prbc, reassess. Hyperglycemia- pt not in DKA, HbA1c 9, dc home with endocrine f/u. Emilia Lizarraga DO     I performed a history and physical exam of the patient and discussed their management with the advanced care provider. I reviewed the advanced care provider's note and agree with the documented findings and plan of care. My medical decision making and objective findings are found above.

## 2020-10-08 ENCOUNTER — OUTPATIENT (OUTPATIENT)
Dept: OUTPATIENT SERVICES | Facility: HOSPITAL | Age: 85
LOS: 1 days | Discharge: HOME | End: 2020-10-08

## 2020-10-08 DIAGNOSIS — D63.1 ANEMIA IN CHRONIC KIDNEY DISEASE: ICD-10-CM

## 2020-10-08 DIAGNOSIS — Z86.69 PERSONAL HISTORY OF OTHER DISEASES OF THE NERVOUS SYSTEM AND SENSE ORGANS: Chronic | ICD-10-CM

## 2020-10-08 NOTE — HEALTH RISK ASSESSMENT
[No] : In the past 12 months have you used drugs other than those required for medical reasons? No [No falls in past year] : Patient reported no falls in the past year [] : No [de-identified] : walking [de-identified] : Low Carbs.

## 2020-10-08 NOTE — PHYSICAL EXAM
[No Edema] : there was no peripheral edema [Soft] : abdomen soft [Non Tender] : non-tender [Normal Bowel Sounds] : normal bowel sounds [Normal] : soft, non-tender, non-distended, no masses palpated, no HSM and normal bowel sounds

## 2020-10-08 NOTE — HISTORY OF PRESENT ILLNESS
[FreeTextEntry1] : diabetes follow up and blood pressure check [de-identified] : 83 y.o female with PMHx significant for HTN, HLD, DM2, diabetic neuropathy, PAD s/p Right LE Stent (?), CKD, presenting for follow up of  BP and diabetes chec. Pt brings in log book of bp readings for the last month, all within the normal range. Pt also brings her FBS log book ith readings ranging from the 150s to 190s. Pt denies any CP, SOB, dizziness, palpitations.

## 2020-10-08 NOTE — ASSESSMENT
[FreeTextEntry1] : 84 y.o female with PMHx significant for HTN, HLD, DM2, diabetic neuropathy, PAD s/p Right LE Stent (?), presenting for follow up of blood pressure, and  diabetes check.\par \par RENAL: CKD Stage 5\par -Stable, last Cr 5/20 1.99\par -Renal US with [facility]\par -Nephrology Dr Marco A zheng.\par -Avoid Nephrotoxins\par -Will check CMP today\par \par ENDOCRINE/NEUROLOGY: h/o T2DM, diabetic neuropathy.\par -Last HgA1c 8.9  in house, today 9.2\par -Currently on Glipizide, Januvia, Jardiance 10 mg (renally dosed)\par -Recommend fasting BS <130.\par -Moderate exercise recommended\par -Dietary changes discussed.\par -Continue Gabapentin 200 mg at bedtime due to poor Cr clearance.\par -Diabetic eye exam July 2019, referral given not done yet.\par \par CVS: h/o HTN, HLD, PAD s/p stent to RLE for severe right leg claudication to right tibio-Peroneal and balloon Angioplasty to Right posterior tibial and right anterior tibial.\par -Cardiology - [facility], Dr D'Agate.\par -Stent placed by Dr Anurag Mcgee.\par -Blood pressure optimal.\par -Continue Carvedilol, Clonidine, Hydralazine, Simvastatin, Clopidogrel.\par -Moderate exercise recommended.\par -NST 4/30/19 negative for Ischemia\par -Carotid duplex Mild-Mod 16-49% stenosis of Right bulb and pRICA. Mild to Mod 16-49% stenosis of the left Bulb and pLICA\par -2D Echo (5/20/19) EF 65-70%, normal LVSF, mild to mod AV stenosis.\par \par GI PPx- Continue Famotidine\par \par HEME: Anemia of chronic disease\par -Stable, currently on Aranesp q3 weeks..\par \par NEURO: Memory difficulties. \par -Continue Namenda 5 mg daily.\par \par HEME: h/o anemia\par -? of chronic disease\par -check CBC today, baseline 10-11\par \par HCM:\par -Ophthalmology referral given, no appointment set up yet.\par -Received Prevnar 13 in office Nov 2019\par -Flu vaccine administered in house today.\par -RTO in 3 months for diabetes check.

## 2020-10-09 ENCOUNTER — RESULT REVIEW (OUTPATIENT)
Age: 85
End: 2020-10-09

## 2020-10-09 ENCOUNTER — APPOINTMENT (OUTPATIENT)
Dept: HEMATOLOGY ONCOLOGY | Facility: CLINIC | Age: 85
End: 2020-10-09
Payer: MEDICARE

## 2020-10-09 ENCOUNTER — APPOINTMENT (OUTPATIENT)
Age: 85
End: 2020-10-09

## 2020-10-09 VITALS
SYSTOLIC BLOOD PRESSURE: 133 MMHG | HEART RATE: 64 BPM | BODY MASS INDEX: 25.04 KG/M2 | WEIGHT: 136.06 LBS | OXYGEN SATURATION: 93 % | TEMPERATURE: 96.8 F | HEIGHT: 62 IN | DIASTOLIC BLOOD PRESSURE: 81 MMHG

## 2020-10-09 LAB
ALBUMIN SERPL ELPH-MCNC: 4.2 G/DL
ALP BLD-CCNC: 108 U/L
ALT SERPL-CCNC: 11 U/L
ANION GAP SERPL CALC-SCNC: 14 MMOL/L
AST SERPL-CCNC: 8 U/L
BASOPHILS # BLD AUTO: 0 K/UL — SIGNIFICANT CHANGE UP (ref 0–0.2)
BASOPHILS # BLD AUTO: 0.02 K/UL
BASOPHILS NFR BLD AUTO: 0.3 %
BASOPHILS NFR BLD AUTO: 0.4 % — SIGNIFICANT CHANGE UP (ref 0–2)
BILIRUB SERPL-MCNC: 0.2 MG/DL
BUN SERPL-MCNC: 75 MG/DL
CALCIUM SERPL-MCNC: 9.8 MG/DL
CHLORIDE SERPL-SCNC: 98 MMOL/L
CO2 SERPL-SCNC: 25 MMOL/L
CREAT SERPL-MCNC: 2.91 MG/DL
EOSINOPHIL # BLD AUTO: 0.2 K/UL — SIGNIFICANT CHANGE UP (ref 0–0.5)
EOSINOPHIL # BLD AUTO: 0.23 K/UL
EOSINOPHIL NFR BLD AUTO: 2.5 % — SIGNIFICANT CHANGE UP (ref 0–6)
EOSINOPHIL NFR BLD AUTO: 3.2 %
GLUCOSE SERPL-MCNC: 421 MG/DL
HBA1C MFR BLD HPLC: 9.2
HCT VFR BLD CALC: 24.5 %
HCT VFR BLD CALC: 28.6 % — LOW (ref 34.5–45)
HGB BLD-MCNC: 6.9 G/DL
HGB BLD-MCNC: 8.8 G/DL — LOW (ref 11.5–15.5)
IMM GRANULOCYTES NFR BLD AUTO: 0.3 %
LYMPHOCYTES # BLD AUTO: 0.87 K/UL
LYMPHOCYTES # BLD AUTO: 1 K/UL — SIGNIFICANT CHANGE UP (ref 1–3.3)
LYMPHOCYTES # BLD AUTO: 12.7 % — LOW (ref 13–44)
LYMPHOCYTES NFR BLD AUTO: 12 %
MAN DIFF?: NORMAL
MCHC RBC-ENTMCNC: 23.5 PG
MCHC RBC-ENTMCNC: 24.2 PG — LOW (ref 27–34)
MCHC RBC-ENTMCNC: 28.2 GM/DL
MCHC RBC-ENTMCNC: 30.7 G/DL — LOW (ref 32–36)
MCV RBC AUTO: 78.9 FL — LOW (ref 80–100)
MCV RBC AUTO: 83.3 FL
MONOCYTES # BLD AUTO: 0.4 K/UL — SIGNIFICANT CHANGE UP (ref 0–0.9)
MONOCYTES # BLD AUTO: 0.45 K/UL
MONOCYTES NFR BLD AUTO: 5.3 % — SIGNIFICANT CHANGE UP (ref 2–14)
MONOCYTES NFR BLD AUTO: 6.2 %
NEUTROPHILS # BLD AUTO: 5.69 K/UL
NEUTROPHILS # BLD AUTO: 6.4 K/UL — SIGNIFICANT CHANGE UP (ref 1.8–7.4)
NEUTROPHILS NFR BLD AUTO: 78 %
NEUTROPHILS NFR BLD AUTO: 79.2 % — HIGH (ref 43–77)
PLATELET # BLD AUTO: 353 K/UL — SIGNIFICANT CHANGE UP (ref 150–400)
PLATELET # BLD AUTO: 392 K/UL
POTASSIUM SERPL-SCNC: 4.2 MMOL/L
PROT SERPL-MCNC: 6.7 G/DL
RBC # BLD: 2.94 M/UL
RBC # BLD: 3.63 M/UL — LOW (ref 3.8–5.2)
RBC # FLD: 17.4 % — HIGH (ref 10.3–14.5)
RBC # FLD: 17.6 %
SODIUM SERPL-SCNC: 138 MMOL/L
WBC # BLD: 8.1 K/UL — SIGNIFICANT CHANGE UP (ref 3.8–10.5)
WBC # FLD AUTO: 7.28 K/UL
WBC # FLD AUTO: 8.1 K/UL — SIGNIFICANT CHANGE UP (ref 3.8–10.5)

## 2020-10-09 PROCEDURE — 99213 OFFICE O/P EST LOW 20 MIN: CPT

## 2020-10-09 RX ORDER — LEVOMEFOLATE/B6/B12/ALGAL OIL 3-35-2 MG
3-90.314-2-35 CAPSULE ORAL
Qty: 60 | Refills: 3 | Status: DISCONTINUED | COMMUNITY
Start: 2019-08-01 | End: 2020-10-09

## 2020-10-09 RX ORDER — DICLOFENAC SODIUM 10 MG/G
1 GEL TOPICAL DAILY
Qty: 1 | Refills: 2 | Status: DISCONTINUED | COMMUNITY
Start: 2020-07-13 | End: 2020-10-09

## 2020-10-09 RX ORDER — FAMOTIDINE 40 MG/1
40 TABLET, FILM COATED ORAL
Qty: 90 | Refills: 0 | Status: DISCONTINUED | COMMUNITY
Start: 2017-07-21 | End: 2020-10-09

## 2020-10-09 NOTE — HISTORY OF PRESENT ILLNESS
[de-identified] : Ms. Hastings is an 85 yo presenting for follow up for anemia. She is Nauruan speaking. \par She is taking iron supplements. \par She has a history of diabetes and renal failure, secondary to diabetes. She follows with Dr. Strickland and . \par  [de-identified] : She was sent to [facility] earlier this week by her primary care MD when found that her HG was 6. She received 1 unit of packed cells, She had been on Aranesp, , but last aranesp was in July 2020. When asked why she did not return , her daughter stated that she was dizzy and did not want to come in. , She was found to iron deficient and currently is on 1 iron tablet a day, she does tolerate this.. Currently no evidence of GI bleeding.\par \par Information obtained with  Michael ID [id number]

## 2020-10-09 NOTE — PHYSICAL EXAM
[Restricted in physically strenuous activity but ambulatory and able to carry out work of a light or sedentary nature] : Status 1- Restricted in physically strenuous activity but ambulatory and able to carry out work of a light or sedentary nature, e.g., light house work, office work [Thin] : thin [Normal] : affect appropriate [de-identified] : + boris

## 2020-10-09 NOTE — ASSESSMENT
[FreeTextEntry1] : Ms. Hastings is an 85 yo presenting for follow up for anemia. She is Turkmen speaking. \par She is taking iron supplements. \par She has a history of diabetes and renal failure, secondary to diabetes. She follows with Dr. Strickland and . \par She is S/P 1 unit of packed cells earlier this week, HGB up to 8.8. Plan is to restart her back on Aranesp, 300mcg every 3 weeks, Will increase her oral iron to 2 per day for 1 week, if tolerated , go to 3 per day, RTO in 3 weeks , will check iron studies at hat time. Will discuss with Dr. Myers, if he wants her to go to GI.

## 2020-10-12 DIAGNOSIS — N18.5 CHRONIC KIDNEY DISEASE, STAGE 5: ICD-10-CM

## 2020-10-26 ENCOUNTER — RX RENEWAL (OUTPATIENT)
Age: 85
End: 2020-10-26

## 2020-10-30 ENCOUNTER — APPOINTMENT (OUTPATIENT)
Age: 85
End: 2020-10-30

## 2020-10-30 ENCOUNTER — RESULT REVIEW (OUTPATIENT)
Age: 85
End: 2020-10-30

## 2020-10-30 ENCOUNTER — APPOINTMENT (OUTPATIENT)
Dept: HEMATOLOGY ONCOLOGY | Facility: CLINIC | Age: 85
End: 2020-10-30
Payer: MEDICARE

## 2020-10-30 VITALS
DIASTOLIC BLOOD PRESSURE: 55 MMHG | WEIGHT: 137.01 LBS | HEIGHT: 62 IN | OXYGEN SATURATION: 97 % | SYSTOLIC BLOOD PRESSURE: 96 MMHG | HEART RATE: 59 BPM | BODY MASS INDEX: 25.21 KG/M2

## 2020-10-30 LAB
BASOPHILS # BLD AUTO: 0 K/UL — SIGNIFICANT CHANGE UP (ref 0–0.2)
BASOPHILS NFR BLD AUTO: 0.4 % — SIGNIFICANT CHANGE UP (ref 0–2)
EOSINOPHIL # BLD AUTO: 0.2 K/UL — SIGNIFICANT CHANGE UP (ref 0–0.5)
EOSINOPHIL NFR BLD AUTO: 2.3 % — SIGNIFICANT CHANGE UP (ref 0–6)
HCT VFR BLD CALC: 33.6 % — LOW (ref 34.5–45)
HGB BLD-MCNC: 10.6 G/DL — LOW (ref 11.5–15.5)
LYMPHOCYTES # BLD AUTO: 0.8 K/UL — LOW (ref 1–3.3)
LYMPHOCYTES # BLD AUTO: 12.2 % — LOW (ref 13–44)
MCHC RBC-ENTMCNC: 25 PG — LOW (ref 27–34)
MCHC RBC-ENTMCNC: 31.5 G/DL — LOW (ref 32–36)
MCV RBC AUTO: 79.2 FL — LOW (ref 80–100)
MONOCYTES # BLD AUTO: 0.4 K/UL — SIGNIFICANT CHANGE UP (ref 0–0.9)
MONOCYTES NFR BLD AUTO: 5.7 % — SIGNIFICANT CHANGE UP (ref 2–14)
NEUTROPHILS # BLD AUTO: 5.4 K/UL — SIGNIFICANT CHANGE UP (ref 1.8–7.4)
NEUTROPHILS NFR BLD AUTO: 79.4 % — HIGH (ref 43–77)
PLATELET # BLD AUTO: 188 K/UL — SIGNIFICANT CHANGE UP (ref 150–400)
RBC # BLD: 4.24 M/UL — SIGNIFICANT CHANGE UP (ref 3.8–5.2)
RBC # FLD: 26.2 % — HIGH (ref 10.3–14.5)
WBC # BLD: 6.8 K/UL — SIGNIFICANT CHANGE UP (ref 3.8–10.5)
WBC # FLD AUTO: 6.8 K/UL — SIGNIFICANT CHANGE UP (ref 3.8–10.5)

## 2020-10-30 PROCEDURE — 99072 ADDL SUPL MATRL&STAF TM PHE: CPT

## 2020-10-30 PROCEDURE — 99213 OFFICE O/P EST LOW 20 MIN: CPT

## 2020-10-30 NOTE — ASSESSMENT
[FreeTextEntry1] : Ms. Hastings is an 83 yo presenting for follow up for anemia.\par She has a history of diabetes and renal failure, secondary to diabetes. She follows with Dr. Strickland and . Has been treated  with aranesp., Her HGb is up to 10.6, will give aranesp today, Parameters say OK up to 11 gms. Her BP is low today, was normal in AM before she took her meds at home, she is asymptomatic. I gave her son , the BP reading s from today and asked them to recheck her BP when she gets home, if still low, to call her MD regarding BP and  medications. HGB is improving nicely and discussed the fact that she may not need the next Aranesp. will recheck iron levels at next visit, They are aware that if she does not get the next aranesp, she will need F/U in 1 month to follow CBC..

## 2020-10-30 NOTE — HISTORY OF PRESENT ILLNESS
[de-identified] : \par Ms. Hastings is an 83 yo presenting for follow up for anemia.\par She has a history of diabetes and renal failure, secondary to diabetes. She follows with Dr. Strickland and . Has been treated  with aranesp. [de-identified] : Restarted aranesp. Is feeling stronger.

## 2020-10-30 NOTE — PHYSICAL EXAM
[Restricted in physically strenuous activity but ambulatory and able to carry out work of a light or sedentary nature] : Status 1- Restricted in physically strenuous activity but ambulatory and able to carry out work of a light or sedentary nature, e.g., light house work, office work [Thin] : thin [Normal] : affect appropriate [de-identified] : + boris

## 2020-11-02 ENCOUNTER — RX CHANGE (OUTPATIENT)
Age: 85
End: 2020-11-02

## 2020-11-05 ENCOUNTER — RX RENEWAL (OUTPATIENT)
Age: 85
End: 2020-11-05

## 2020-11-16 ENCOUNTER — RX RENEWAL (OUTPATIENT)
Age: 85
End: 2020-11-16

## 2020-11-16 ENCOUNTER — OUTPATIENT (OUTPATIENT)
Dept: OUTPATIENT SERVICES | Facility: HOSPITAL | Age: 85
LOS: 1 days | Discharge: HOME | End: 2020-11-16

## 2020-11-16 DIAGNOSIS — Z86.69 PERSONAL HISTORY OF OTHER DISEASES OF THE NERVOUS SYSTEM AND SENSE ORGANS: Chronic | ICD-10-CM

## 2020-11-16 DIAGNOSIS — D63.1 ANEMIA IN CHRONIC KIDNEY DISEASE: ICD-10-CM

## 2020-11-17 ENCOUNTER — APPOINTMENT (OUTPATIENT)
Dept: FAMILY MEDICINE | Facility: CLINIC | Age: 85
End: 2020-11-17
Payer: MEDICARE

## 2020-11-17 ENCOUNTER — RESULT CHARGE (OUTPATIENT)
Age: 85
End: 2020-11-17

## 2020-11-17 VITALS
HEART RATE: 61 BPM | HEIGHT: 62 IN | OXYGEN SATURATION: 98 % | WEIGHT: 136 LBS | TEMPERATURE: 96.7 F | DIASTOLIC BLOOD PRESSURE: 62 MMHG | BODY MASS INDEX: 25.03 KG/M2 | RESPIRATION RATE: 14 BRPM | SYSTOLIC BLOOD PRESSURE: 124 MMHG

## 2020-11-17 DIAGNOSIS — J30.2 OTHER SEASONAL ALLERGIC RHINITIS: ICD-10-CM

## 2020-11-17 PROCEDURE — 83036 HEMOGLOBIN GLYCOSYLATED A1C: CPT | Mod: QW

## 2020-11-17 PROCEDURE — 36415 COLL VENOUS BLD VENIPUNCTURE: CPT

## 2020-11-17 PROCEDURE — 99214 OFFICE O/P EST MOD 30 MIN: CPT | Mod: 25

## 2020-11-17 RX ORDER — ASPIRIN 325 MG/1
325 TABLET, FILM COATED ORAL DAILY
Qty: 90 | Refills: 1 | Status: DISCONTINUED | COMMUNITY
Start: 2019-06-26 | End: 2020-11-17

## 2020-11-20 ENCOUNTER — RESULT REVIEW (OUTPATIENT)
Age: 85
End: 2020-11-20

## 2020-11-20 ENCOUNTER — NON-APPOINTMENT (OUTPATIENT)
Age: 85
End: 2020-11-20

## 2020-11-20 ENCOUNTER — APPOINTMENT (OUTPATIENT)
Age: 85
End: 2020-11-20

## 2020-11-20 PROBLEM — J30.2 SEASONAL ALLERGIES: Status: ACTIVE | Noted: 2020-05-13

## 2020-11-20 LAB
BASOPHILS # BLD AUTO: 0 K/UL — SIGNIFICANT CHANGE UP (ref 0–0.2)
BASOPHILS NFR BLD AUTO: 0.5 % — SIGNIFICANT CHANGE UP (ref 0–2)
EOSINOPHIL # BLD AUTO: 0.3 K/UL — SIGNIFICANT CHANGE UP (ref 0–0.5)
EOSINOPHIL NFR BLD AUTO: 3 % — SIGNIFICANT CHANGE UP (ref 0–6)
HCT VFR BLD CALC: 46.9 % — HIGH (ref 34.5–45)
HGB BLD-MCNC: 14.3 G/DL — SIGNIFICANT CHANGE UP (ref 11.5–15.5)
LYMPHOCYTES # BLD AUTO: 1.2 K/UL — SIGNIFICANT CHANGE UP (ref 1–3.3)
LYMPHOCYTES # BLD AUTO: 13.3 % — SIGNIFICANT CHANGE UP (ref 13–44)
MCHC RBC-ENTMCNC: 26.7 PG — LOW (ref 27–34)
MCHC RBC-ENTMCNC: 30.6 G/DL — LOW (ref 32–36)
MCV RBC AUTO: 87.4 FL — SIGNIFICANT CHANGE UP (ref 80–100)
MONOCYTES # BLD AUTO: 0.7 K/UL — SIGNIFICANT CHANGE UP (ref 0–0.9)
MONOCYTES NFR BLD AUTO: 8 % — SIGNIFICANT CHANGE UP (ref 2–14)
NEUTROPHILS # BLD AUTO: 6.8 K/UL — SIGNIFICANT CHANGE UP (ref 1.8–7.4)
NEUTROPHILS NFR BLD AUTO: 75.3 % — SIGNIFICANT CHANGE UP (ref 43–77)
PLATELET # BLD AUTO: 218 K/UL — SIGNIFICANT CHANGE UP (ref 150–400)
RBC # BLD: 5.36 M/UL — HIGH (ref 3.8–5.2)
RBC # FLD: 22.9 % — HIGH (ref 10.3–14.5)
WBC # BLD: 9.1 K/UL — SIGNIFICANT CHANGE UP (ref 3.8–10.5)
WBC # FLD AUTO: 9.1 K/UL — SIGNIFICANT CHANGE UP (ref 3.8–10.5)

## 2020-11-20 NOTE — HISTORY OF PRESENT ILLNESS
[FreeTextEntry1] : diabetes and BP check [de-identified] : 83 y.o female with PMHx significant for HTN, HLD, DM2, diabetic neuropathy, PAD s/p Right LE Stent (?), CKD, presenting for follow up of  BP and diabetes chec. Pt brings in log book of bp readings for the last month, all within the normal range. Pt also brings her FBS log book ith readings ranging from the 150s to 190s. Pt denies any CP, SOB, dizziness, palpitations.

## 2020-11-20 NOTE — HEALTH RISK ASSESSMENT
[No] : In the past 12 months have you used drugs other than those required for medical reasons? No [No falls in past year] : Patient reported no falls in the past year [] : No [de-identified] : walking [de-identified] : Low Carbs.

## 2020-11-20 NOTE — ASSESSMENT
[FreeTextEntry1] : 84 y.o female with PMHx significant for HTN, HLD, DM2, diabetic neuropathy, PAD s/p Right LE Stent (?), presenting for follow up of blood pressure, and  diabetes check.\par \par RENAL: CKD Stage 5\par -Stable, last Cr 5/20 1.99\par -Renal US with [facility]\par -Nephrology Dr Strickland following.\par -Avoid Nephrotoxins\par -Will check Renal panel today\par \par ENDOCRINE/NEUROLOGY: h/o T2DM, diabetic neuropathy.\par -Last HgA1c 9.2, today 7.0\par -Currently on Glipizide, Januvia, Jardiance 10 mg (renally dosed)\par -Recommend fasting BS <130.\par -Moderate exercise recommended\par -Dietary changes discussed.\par -Continue Gabapentin 200 mg at bedtime due to poor Cr clearance.\par -Rx for Metanx e-prescribed for neuropathy\par -Diabetic eye exam July 2019, referral given not done yet.\par \par CVS: h/o HTN, HLD, PAD s/p stent to RLE for severe right leg claudication to right tibio-Peroneal and balloon Angioplasty to Right posterior tibial and right anterior tibial.\par -Cardiology - [facility], Dr D'Agate.\par -Stent placed by Dr Anurag Mcgee.\par -Blood pressure slightly low.\par -Continue Carvedilol, Clonidine, Hydralazine 50 mg TID, Simvastatin, Clopidogrel.\par -Moderate exercise recommended.\par -NST 4/30/19 negative for Ischemia\par -Carotid duplex Mild-Mod 16-49% stenosis of Right bulb and pRICA. Mild to Mod 16-49% stenosis of the left Bulb and pLICA\par -2D Echo (5/20/19) EF 65-70%, normal LVSF, mild to mod AV stenosis.\par \par GI PPx- Continue Famotidine\par \par HEME: Anemia of chronic disease\par -Stable, currently on Aranesp q3 weeks.\par -Hematology note appreciated, Dr MILDRED Myers  following.\par \par NEURO: Memory difficulties. \par -Continue Namenda 5 mg daily.\par \par HEME: h/o anemia\par -? of chronic disease\par -check CBC today, baseline 10-11\par \par HCM:\par -Ophthalmology referral given, no appointment set up yet.\par -Received Prevnar 13 in office Nov 2019\par -Flu vaccine administered in house 10/20\par -RTO in 3 months for diabetes check.

## 2020-12-01 LAB
ALBUMIN SERPL ELPH-MCNC: 4.5 G/DL
ANION GAP SERPL CALC-SCNC: 16 MMOL/L
BUN SERPL-MCNC: 71 MG/DL
CALCIUM SERPL-MCNC: 10.2 MG/DL
CHLORIDE SERPL-SCNC: 96 MMOL/L
CO2 SERPL-SCNC: 30 MMOL/L
CREAT SERPL-MCNC: 2.43 MG/DL
GLUCOSE SERPL-MCNC: 318 MG/DL
HBA1C MFR BLD HPLC: 7
PHOSPHATE SERPL-MCNC: 5.4 MG/DL
POTASSIUM SERPL-SCNC: 3.8 MMOL/L
SODIUM SERPL-SCNC: 141 MMOL/L

## 2020-12-04 ENCOUNTER — APPOINTMENT (OUTPATIENT)
Dept: NEPHROLOGY | Facility: CLINIC | Age: 85
End: 2020-12-04
Payer: MEDICARE

## 2020-12-04 VITALS
TEMPERATURE: 96.8 F | SYSTOLIC BLOOD PRESSURE: 180 MMHG | WEIGHT: 137 LBS | BODY MASS INDEX: 25.21 KG/M2 | HEART RATE: 60 BPM | HEIGHT: 62 IN | DIASTOLIC BLOOD PRESSURE: 70 MMHG

## 2020-12-04 PROCEDURE — 99072 ADDL SUPL MATRL&STAF TM PHE: CPT

## 2020-12-04 PROCEDURE — 36415 COLL VENOUS BLD VENIPUNCTURE: CPT

## 2020-12-04 PROCEDURE — 99214 OFFICE O/P EST MOD 30 MIN: CPT | Mod: 25

## 2020-12-04 NOTE — PHYSICAL EXAM
[General Appearance - Alert] : alert [General Appearance - In No Acute Distress] : in no acute distress [Outer Ear] : the ears and nose were normal in appearance [Oropharynx] : the oropharynx was normal [Neck Appearance] : the appearance of the neck was normal [Neck Cervical Mass (___cm)] : no neck mass was observed [Jugular Venous Distention Increased] : there was no jugular-venous distention [Thyroid Diffuse Enlargement] : the thyroid was not enlarged [Thyroid Nodule] : there were no palpable thyroid nodules [Auscultation Breath Sounds / Voice Sounds] : lungs were clear to auscultation bilaterally [Heart Rate And Rhythm] : heart rate was normal and rhythm regular [Heart Sounds] : normal S1 and S2 [Heart Sounds Gallop] : no gallops [Murmurs] : no murmurs [Heart Sounds Pericardial Friction Rub] : no pericardial rub [Full Pulse] : the pedal pulses are present [Edema] : there was no peripheral edema [Bowel Sounds] : normal bowel sounds [Abdomen Soft] : soft [Abdomen Tenderness] : non-tender [Abdomen Mass (___ Cm)] : no abdominal mass palpated [Cervical Lymph Nodes Enlarged Posterior Bilaterally] : posterior cervical [Cervical Lymph Nodes Enlarged Anterior Bilaterally] : anterior cervical [Supraclavicular Lymph Nodes Enlarged Bilaterally] : supraclavicular [Axillary Lymph Nodes Enlarged Bilaterally] : axillary [Femoral Lymph Nodes Enlarged Bilaterally] : femoral [Inguinal Lymph Nodes Enlarged Bilaterally] : inguinal [No CVA Tenderness] : no ~M costovertebral angle tenderness [No Spinal Tenderness] : no spinal tenderness [Abnormal Walk] : normal gait [Nail Clubbing] : no clubbing  or cyanosis of the fingernails [Musculoskeletal - Swelling] : no joint swelling seen [Motor Tone] : muscle strength and tone were normal [Skin Color & Pigmentation] : normal skin color and pigmentation [Skin Turgor] : normal skin turgor [] : no rash [Deep Tendon Reflexes (DTR)] : deep tendon reflexes were 2+ and symmetric [Sensation] : the sensory exam was normal to light touch and pinprick [No Focal Deficits] : no focal deficits [Oriented To Time, Place, And Person] : oriented to person, place, and time [Impaired Insight] : insight and judgment were intact [Affect] : the affect was normal [FreeTextEntry1] : Pale,

## 2020-12-04 NOTE — ASSESSMENT
[FreeTextEntry1] : Diabetes mellitus (250.00) (E11.9) HTN (hypertension) (401.9) (I10) CKD (chronic kidney disease), stage V (585.5) (N18.5) Diabetic neuropathy (250.60,357.2) (E11.40) Need for Streptococcus pneumoniae vaccination (V03.82) (Z23)\par \par   83 y.o female with PMHx significant for HTN, HLD, DM2, diabetic neuropathy, PAD s/p Right LE Stent (?), presenting for follow up of diabetes,high blood pressure.  RENAL: CKD Stage 5 -Check Renal panel today. -Renal US with Medical Renal Disease - to poor Cr clearance. -Diabetic eye exam July 2019.  CVS: h/o HTN,  negative for Ischemia -Carotid duplex Mild-Mod 16-49% stenosis of Right bulb and pRICA. Mild to Mod 16-49% stenosis of the left Bulb and pLICA -2D Echo (5/20/19) EF 65-70%, normal LVSF, mild to mod AV stenosis.   \par \par P : Hold Losartan, Lasix PRN,\par \par Clinically well, Ref. to Hematology for  Management of anemia, \par \par RTO X 8 weeks,

## 2020-12-05 ENCOUNTER — LABORATORY RESULT (OUTPATIENT)
Age: 85
End: 2020-12-05

## 2020-12-07 LAB
25(OH)D3 SERPL-MCNC: 38.5 NG/ML
ALBUMIN SERPL ELPH-MCNC: 4.3 G/DL
ANION GAP SERPL CALC-SCNC: 13 MMOL/L
APPEARANCE: CLEAR
BACTERIA: ABNORMAL
BASOPHILS # BLD AUTO: 0 K/UL
BASOPHILS NFR BLD AUTO: 0 %
BILIRUBIN URINE: NEGATIVE
BLOOD URINE: NEGATIVE
BUN SERPL-MCNC: 56 MG/DL
CALCIUM SERPL-MCNC: 9.9 MG/DL
CALCIUM SERPL-MCNC: 9.9 MG/DL
CHLORIDE SERPL-SCNC: 101 MMOL/L
CO2 SERPL-SCNC: 24 MMOL/L
COLOR: NORMAL
CREAT SERPL-MCNC: 1.94 MG/DL
CREAT SPEC-SCNC: 35 MG/DL
CREAT/PROT UR: 1.1 RATIO
EOSINOPHIL # BLD AUTO: 0.16 K/UL
EOSINOPHIL NFR BLD AUTO: 1.8 %
GLUCOSE QUALITATIVE U: ABNORMAL
GLUCOSE SERPL-MCNC: 343 MG/DL
HCT VFR BLD CALC: 43.9 %
HGB BLD-MCNC: 13 G/DL
HYALINE CASTS: 1 /LPF
KETONES URINE: NEGATIVE
LEUKOCYTE ESTERASE URINE: NEGATIVE
LYMPHOCYTES # BLD AUTO: 1.61 K/UL
LYMPHOCYTES NFR BLD AUTO: 18.2 %
MAN DIFF?: NORMAL
MCHC RBC-ENTMCNC: 26.2 PG
MCHC RBC-ENTMCNC: 29.6 GM/DL
MCV RBC AUTO: 88.5 FL
MICROSCOPIC-UA: NORMAL
MONOCYTES # BLD AUTO: 0.24 K/UL
MONOCYTES NFR BLD AUTO: 2.7 %
NEUTROPHILS # BLD AUTO: 6.82 K/UL
NEUTROPHILS NFR BLD AUTO: 77.3 %
NITRITE URINE: NEGATIVE
PARATHYROID HORMONE INTACT: 26 PG/ML
PH URINE: 5.5
PHOSPHATE SERPL-MCNC: 3.7 MG/DL
PLATELET # BLD AUTO: 232 K/UL
POTASSIUM SERPL-SCNC: 4.1 MMOL/L
PROT UR-MCNC: 37 MG/DL
PROTEIN URINE: ABNORMAL
RBC # BLD: 4.96 M/UL
RBC # FLD: 22.4 %
RED BLOOD CELLS URINE: 3 /HPF
SODIUM SERPL-SCNC: 138 MMOL/L
SPECIFIC GRAVITY URINE: 1.02
SQUAMOUS EPITHELIAL CELLS: 2 /HPF
UROBILINOGEN URINE: NORMAL
WBC # FLD AUTO: 8.82 K/UL
WHITE BLOOD CELLS URINE: 7 /HPF

## 2020-12-14 ENCOUNTER — RX CHANGE (OUTPATIENT)
Age: 85
End: 2020-12-14

## 2020-12-16 ENCOUNTER — RX CHANGE (OUTPATIENT)
Age: 85
End: 2020-12-16

## 2020-12-18 ENCOUNTER — APPOINTMENT (OUTPATIENT)
Age: 85
End: 2020-12-18

## 2021-01-05 ENCOUNTER — RX RENEWAL (OUTPATIENT)
Age: 86
End: 2021-01-05

## 2021-01-06 ENCOUNTER — RX RENEWAL (OUTPATIENT)
Age: 86
End: 2021-01-06

## 2021-01-26 ENCOUNTER — RX RENEWAL (OUTPATIENT)
Age: 86
End: 2021-01-26

## 2021-01-29 ENCOUNTER — APPOINTMENT (OUTPATIENT)
Dept: NEPHROLOGY | Facility: CLINIC | Age: 86
End: 2021-01-29

## 2021-01-30 ENCOUNTER — RX RENEWAL (OUTPATIENT)
Age: 86
End: 2021-01-30

## 2021-02-08 ENCOUNTER — RX RENEWAL (OUTPATIENT)
Age: 86
End: 2021-02-08

## 2021-02-12 ENCOUNTER — RX RENEWAL (OUTPATIENT)
Age: 86
End: 2021-02-12

## 2021-02-22 ENCOUNTER — APPOINTMENT (OUTPATIENT)
Dept: FAMILY MEDICINE | Facility: CLINIC | Age: 86
End: 2021-02-22
Payer: MEDICARE

## 2021-02-22 VITALS
BODY MASS INDEX: 25.76 KG/M2 | TEMPERATURE: 98.2 F | HEIGHT: 62 IN | HEART RATE: 61 BPM | WEIGHT: 140 LBS | RESPIRATION RATE: 12 BRPM | OXYGEN SATURATION: 98 % | DIASTOLIC BLOOD PRESSURE: 70 MMHG | SYSTOLIC BLOOD PRESSURE: 110 MMHG

## 2021-02-22 LAB — HBA1C MFR BLD HPLC: 9

## 2021-02-22 PROCEDURE — 99072 ADDL SUPL MATRL&STAF TM PHE: CPT

## 2021-02-22 PROCEDURE — 99213 OFFICE O/P EST LOW 20 MIN: CPT | Mod: 25

## 2021-02-22 PROCEDURE — 83036 HEMOGLOBIN GLYCOSYLATED A1C: CPT | Mod: QW

## 2021-02-22 NOTE — HISTORY OF PRESENT ILLNESS
[FreeTextEntry1] : follow up. [de-identified] : 83 y.o female with PMHx significant for HTN, HLD, DM2, diabetic neuropathy, PAD s/p Right LE Stent (?), CKD, presenting for follow up of  BP and diabetes check. Pt brings in log book of her FBS with readings ranging from the low to high 200s. Pt denies any CP, SOB, dizziness, palpitations.

## 2021-02-22 NOTE — HEALTH RISK ASSESSMENT
[No] : In the past 12 months have you used drugs other than those required for medical reasons? No [No falls in past year] : Patient reported no falls in the past year [] : No [de-identified] : walking [de-identified] : Low Carbs.

## 2021-02-22 NOTE — COUNSELING
[Fall prevention counseling provided] : Fall prevention counseling provided [Adequate lighting] : Adequate lighting [No throw rugs] : No throw rugs [Behavioral health counseling provided] : Behavioral health counseling provided [Sleep ___ hours/day] : Sleep [unfilled] hours/day [Encouraged to increase physical activity] : Encouraged to increase physical activity [____ min/wk Activity] : [unfilled] min/wk activity [None] : None [Good understanding] : Patient has a good understanding of lifestyle changes and steps needed to achieve self management goal [FreeTextEntry2] : Stop daily intake of "Coconut water"

## 2021-02-22 NOTE — ASSESSMENT
[FreeTextEntry1] : 84 y.o female with PMHx significant for HTN, HLD, DM2, diabetic neuropathy, PAD s/p Right LE Stent (?), presenting for follow up of diabetes check.\par \par RENAL: CKD Stage 5\par -Stable, last Cr 12/20 1.94 (2.43)\par -Renal US with [facility] Renal Disease\par -Nephrology Dr Strickland following, note appreciated\par -Discontinued Losartan and Furosemide..\par -Avoid Nephrotoxins\par -Will check Renal panel today\par \par ENDOCRINE/NEUROLOGY: h/o T2DM, diabetic neuropathy.\par -Last HgA1c 7.0-->9.0\par -Currently on Glipizide, Januvia, Jardiance 10 mg (renally dosed)\par -Recommend fasting BS <130.\par -Moderate exercise recommended\par -Dietary changes discussed especially discontinuing Coconut  water\par -Continue Gabapentin 200 mg at bedtime due to poor Cr clearance.\par -Rx for Metanx e-prescribed for neuropathy\par -Diabetic eye exam July 2019, referral given not done yet.\par -If A1c shows no improvement will be safer to start on Basal insulin, discussed with patient and son whom are in agreement.\par \par CVS: h/o HTN, HLD, PAD s/p stent to RLE for severe right leg claudication to right tibio-Peroneal and balloon Angioplasty to Right posterior tibial and right anterior tibial.\par -Cardiology - [facility], Dr D'Agate.\par -Stent placed by Dr Anurag Mcgee.\par -Blood pressure at goal\par -Continue Carvedilol, Clonidine, Hydralazine 50 mg TID, Simvastatin, Clopidogrel.\par -Moderate exercise recommended.\par -NST 4/30/19 negative for Ischemia\par -Carotid duplex Mild-Mod 16-49% stenosis of Right bulb and pRICA. Mild to Mod 16-49% stenosis of the left Bulb and pLICA\par -2D Echo (5/20/19) EF 65-70%, normal LVSF, mild to mod AV stenosis.\par \par GI PPx- Continue Famotidine\par \par HEME: Anemia of chronic disease\par -Stable, currently s/p  Aranesp injections\par -Hematology note appreciated, Dr MILDRED Myers  following.\par \par NEURO: Memory difficulties. \par -Continue Namenda 5 mg daily.\par \par HCM:\par -Ophthalmology referral given, no appointment set up yet.\par -Received Prevnar 13 in office Nov 2019\par -Will await for pneumonia 23 vaccine on next visit\par -Recommended Covid-19 vaccine.\par -Flu vaccine administered in house 10/20\par -RTO in 3 months for diabetes check.

## 2021-03-02 LAB
ALBUMIN SERPL ELPH-MCNC: 4.2 G/DL
ANION GAP SERPL CALC-SCNC: 16 MMOL/L
BASOPHILS # BLD AUTO: 0.02 K/UL
BASOPHILS NFR BLD AUTO: 0.2 %
BUN SERPL-MCNC: 89 MG/DL
CALCIUM SERPL-MCNC: 9.2 MG/DL
CHLORIDE SERPL-SCNC: 98 MMOL/L
CO2 SERPL-SCNC: 23 MMOL/L
CREAT SERPL-MCNC: 2.45 MG/DL
EOSINOPHIL # BLD AUTO: 0.53 K/UL
EOSINOPHIL NFR BLD AUTO: 6.4 %
GLUCOSE SERPL-MCNC: 364 MG/DL
HCT VFR BLD CALC: 32.5 %
HGB BLD-MCNC: 9.5 G/DL
IMM GRANULOCYTES NFR BLD AUTO: 0.2 %
LYMPHOCYTES # BLD AUTO: 1.24 K/UL
LYMPHOCYTES NFR BLD AUTO: 15 %
MAN DIFF?: NORMAL
MCHC RBC-ENTMCNC: 27.9 PG
MCHC RBC-ENTMCNC: 29.2 GM/DL
MCV RBC AUTO: 95.6 FL
MONOCYTES # BLD AUTO: 0.51 K/UL
MONOCYTES NFR BLD AUTO: 6.2 %
NEUTROPHILS # BLD AUTO: 5.95 K/UL
NEUTROPHILS NFR BLD AUTO: 72 %
PHOSPHATE SERPL-MCNC: 5.6 MG/DL
PLATELET # BLD AUTO: 230 K/UL
POTASSIUM SERPL-SCNC: 3.9 MMOL/L
RBC # BLD: 3.4 M/UL
RBC # FLD: 14.9 %
SODIUM SERPL-SCNC: 137 MMOL/L
WBC # FLD AUTO: 8.27 K/UL

## 2021-03-08 ENCOUNTER — RX CHANGE (OUTPATIENT)
Age: 86
End: 2021-03-08

## 2021-03-08 ENCOUNTER — RX RENEWAL (OUTPATIENT)
Age: 86
End: 2021-03-08

## 2021-03-16 ENCOUNTER — APPOINTMENT (OUTPATIENT)
Dept: FAMILY MEDICINE | Facility: CLINIC | Age: 86
End: 2021-03-16
Payer: MEDICARE

## 2021-03-16 VITALS
BODY MASS INDEX: 25.58 KG/M2 | SYSTOLIC BLOOD PRESSURE: 110 MMHG | RESPIRATION RATE: 16 BRPM | WEIGHT: 139 LBS | OXYGEN SATURATION: 97 % | HEART RATE: 63 BPM | DIASTOLIC BLOOD PRESSURE: 60 MMHG | TEMPERATURE: 96.9 F | HEIGHT: 62 IN

## 2021-03-16 PROCEDURE — 99213 OFFICE O/P EST LOW 20 MIN: CPT

## 2021-03-16 PROCEDURE — 99072 ADDL SUPL MATRL&STAF TM PHE: CPT

## 2021-03-17 NOTE — ASSESSMENT
[FreeTextEntry1] : 84 y.o female with PMHx significant for HTN, HLD, DM2, diabetic neuropathy, PAD s/p Right LE Stent (?), presenting for follow up of blood pressure.\par \par RENAL: CKD Stage 5\par -Stable, last Cr 2.45\par -Renal US with Medical Renal Disease\par -Nephrology Dr Strickland following, note appreciated\par -Discontinued Losartan and Furosemide, put back on Furosemide by ?Cardio\par -Avoid Nephrotoxins\par \par ENDOCRINE/NEUROLOGY: h/o T2DM, diabetic neuropathy.\par -Last HgA1c 7.0-->9.0\par -Currently on Glipizide, Januvia, Jardiance 10 mg (renally dosed)\par -Recommend fasting BS <130.\par -Moderate exercise recommended\par -Dietary changes discussed especially discontinuing Coconut  water\par -Continue Gabapentin 200 mg at bedtime due to poor Cr clearance.\par -Rx for Metanx e-prescribed for neuropathy\par -Diabetic eye exam July 2019, referral given not done yet.\par -If A1c shows no improvement will be safer to start on Basal insulin, discussed with patient and son whom are in agreement.\par \par CVS: h/o HTN, HLD, PAD s/p stent to RLE for severe right leg claudication to right tibio-Peroneal and balloon Angioplasty to Right posterior tibial and right anterior tibial.\par -Blood pressure optimal.\par -Cardiology - [facility], Dr D'Agate.\par -Stent placed by Dr Anurag Mcgee.\par -Blood pressure at goal\par -Continue Carvedilol, Clonidine increased to 0.1 tid, Hydralazine increased to 100 mg TID, Simvastatin, Clopidogrel (Rx refilled).\par -Moderate exercise recommended.\par -NST 4/30/19 negative for Ischemia\par -Carotid duplex Mild-Mod 16-49% stenosis of Right bulb and pRICA. Mild to Mod 16-49% stenosis of the left Bulb and pLICA\par -2D Echo (5/20/19) EF 65-70%, normal LVSF, mild to mod AV stenosis.\par \par GI PPx- Continue Famotidine\par \par HEME: Anemia of chronic disease\par -Stable, currently s/p  Aranesp injections\par -Hematology note appreciated, Dr MILDRED Myers  following.\par \par NEURO: Memory difficulties. \par -Continue Namenda 5 mg daily.\par \par HCM:\par -Ophthalmology referral given, no appointment set up yet.\par -Received Prevnar 13 in office Nov 2019\par -Will await for pneumonia 23 vaccine on next visit\par -Recommended Covid-19 vaccine.\par -Flu vaccine administered in house 10/20\par -RTO in 2 months for diabetes check.

## 2021-03-17 NOTE — HEALTH RISK ASSESSMENT
[No] : In the past 12 months have you used drugs other than those required for medical reasons? No [No falls in past year] : Patient reported no falls in the past year [] : No [de-identified] : walking [de-identified] : Low Carbs.

## 2021-03-17 NOTE — HISTORY OF PRESENT ILLNESS
[FreeTextEntry8] : 83 y.o female with PMHx significant for HTN, HLD, DM2, diabetic neuropathy, PAD s/p Right LE Stent (?), CKD, presenting to the office stating that her BP has been elevated whenever she goes to the cardiologist (Dr Bonilla), her BP medications have been changed. Pt had appointment to get NST but cancelled secondary to elevated BP. Pt currently denies any CP, SOB, palpitations, N/V/D.

## 2021-04-08 ENCOUNTER — RX CHANGE (OUTPATIENT)
Age: 86
End: 2021-04-08

## 2021-04-13 ENCOUNTER — RX CHANGE (OUTPATIENT)
Age: 86
End: 2021-04-13

## 2021-04-13 NOTE — ED ADULT NURSE REASSESSMENT NOTE - GENERAL PATIENT STATE
comfortable appearance/cooperative [Calm] : calm [de-identified] : NAD, comfortable [de-identified] : NCAT, no scleral icterus [de-identified] : No clubbing, cyanosis, or edema. [de-identified] : soft NT ND.  Incisions healing well without erythema or induration.  No evidence of any incisional hernias on Valsalva maneuver. [de-identified] : Warm, dry. [de-identified] : A&Ox3

## 2021-04-15 RX ORDER — L-METHYLFOLATE-ALGAE-VIT B12-B6 CAP 3-90.314-2-35 MG 3-90.314-2-35 MG
3-90.314-2-35 CAP ORAL
Qty: 180 | Refills: 1 | Status: ACTIVE | COMMUNITY
Start: 2019-06-26 | End: 1900-01-01

## 2021-04-20 ENCOUNTER — RX RENEWAL (OUTPATIENT)
Age: 86
End: 2021-04-20

## 2021-04-20 RX ORDER — ASPIRIN 325 MG/1
325 TABLET, COATED ORAL
Qty: 90 | Refills: 1 | Status: ACTIVE | COMMUNITY
Start: 2020-04-30 | End: 1900-01-01

## 2021-04-27 ENCOUNTER — RX RENEWAL (OUTPATIENT)
Age: 86
End: 2021-04-27

## 2021-05-13 ENCOUNTER — APPOINTMENT (OUTPATIENT)
Dept: FAMILY MEDICINE | Facility: CLINIC | Age: 86
End: 2021-05-13
Payer: MEDICARE

## 2021-05-13 VITALS
HEART RATE: 64 BPM | WEIGHT: 135 LBS | OXYGEN SATURATION: 96 % | TEMPERATURE: 96.5 F | DIASTOLIC BLOOD PRESSURE: 70 MMHG | RESPIRATION RATE: 16 BRPM | HEIGHT: 62 IN | SYSTOLIC BLOOD PRESSURE: 116 MMHG | BODY MASS INDEX: 24.84 KG/M2

## 2021-05-13 DIAGNOSIS — Z23 ENCOUNTER FOR IMMUNIZATION: ICD-10-CM

## 2021-05-13 PROCEDURE — 99072 ADDL SUPL MATRL&STAF TM PHE: CPT

## 2021-05-13 PROCEDURE — 99214 OFFICE O/P EST MOD 30 MIN: CPT | Mod: 25

## 2021-05-13 PROCEDURE — 83036 HEMOGLOBIN GLYCOSYLATED A1C: CPT | Mod: QW

## 2021-05-13 RX ORDER — GABAPENTIN 100 MG/1
100 CAPSULE ORAL
Qty: 540 | Refills: 0 | Status: COMPLETED | COMMUNITY
Start: 2019-06-26 | End: 2021-05-13

## 2021-05-13 RX ORDER — SITAGLIPTIN 100 MG/1
100 TABLET, FILM COATED ORAL
Qty: 90 | Refills: 1 | Status: ACTIVE | COMMUNITY
Start: 2017-07-15 | End: 1900-01-01

## 2021-05-13 RX ORDER — TIZANIDINE 2 MG/1
2 TABLET ORAL
Refills: 0 | Status: COMPLETED | COMMUNITY
End: 2021-05-13

## 2021-05-14 NOTE — COUNSELING
[Fall prevention counseling provided] : Fall prevention counseling provided [Adequate lighting] : Adequate lighting [Use proper foot wear] : Use proper foot wear [No throw rugs] : No throw rugs [Use recommended devices] : Use recommended devices [Behavioral health counseling provided] : Behavioral health counseling provided [Sleep ___ hours/day] : Sleep [unfilled] hours/day [Engage in a relaxing activity] : Engage in a relaxing activity [Plan in advance] : Plan in advance [AUDIT-C Screening administered and reviewed] : AUDIT-C Screening administered and reviewed [Encouraged to increase physical activity] : Encouraged to increase physical activity [____ min/wk Activity] : [unfilled] min/wk activity [None] : None [Good understanding] : Patient has a good understanding of lifestyle changes and steps needed to achieve self management goal

## 2021-05-14 NOTE — REVIEW OF SYSTEMS
[Confusion] : confusion [Memory Loss] : memory loss [Negative] : Heme/Lymph [Insomnia] : no insomnia

## 2021-05-14 NOTE — HEALTH RISK ASSESSMENT
[No] : In the past 12 months have you used drugs other than those required for medical reasons? No [No falls in past year] : Patient reported no falls in the past year [] : No [de-identified] : walking [de-identified] : Low Carbs.

## 2021-05-14 NOTE — HISTORY OF PRESENT ILLNESS
[FreeTextEntry1] : blood pressure check, blood work. [de-identified] : 85 y.o female with PMHx significant for HTN, HLD, DM2, diabetic neuropathy, PAD s/p Right LE Stent (?), CKD stage 5, dementia, memory loss,  presenting to the office for follow up. Family member (daughter) concerned regarding the memory loss issues, had to take time off from work as her mother is now home alone, duet to her memory lapses unknown if she takes her medication regularly and as scheduled since her and her  go to work early in the morning and patient is still asleep. ALso concerned regarding her using the kitchen and forgetting to turn gas off. Pt has been seen entering the shower with her clothes on with no clue of what she was about to do.Pt will have a teleconference coming up to ascertain wether she needs HHA at home.

## 2021-05-18 RX ORDER — INSULIN DETEMIR 100 [IU]/ML
100 INJECTION, SOLUTION SUBCUTANEOUS AT BEDTIME
Qty: 1 | Refills: 2 | Status: ACTIVE | COMMUNITY
Start: 2021-05-18 | End: 1900-01-01

## 2021-05-18 RX ORDER — EMPAGLIFLOZIN 10 MG/1
10 TABLET, FILM COATED ORAL
Qty: 90 | Refills: 0 | Status: DISCONTINUED | COMMUNITY
Start: 2020-05-12 | End: 2021-05-18

## 2021-05-18 RX ORDER — INSULIN GLARGINE 100 [IU]/ML
100 INJECTION, SOLUTION SUBCUTANEOUS
Qty: 1 | Refills: 3 | Status: DISCONTINUED | COMMUNITY
Start: 2021-05-13 | End: 2021-05-18

## 2021-05-19 ENCOUNTER — INPATIENT (INPATIENT)
Facility: HOSPITAL | Age: 86
LOS: 5 days | Discharge: HOME | DRG: 65 | End: 2021-05-25
Attending: HOSPITALIST | Admitting: HOSPITALIST
Payer: MEDICARE

## 2021-05-19 VITALS
HEART RATE: 59 BPM | HEIGHT: 61 IN | SYSTOLIC BLOOD PRESSURE: 185 MMHG | OXYGEN SATURATION: 97 % | RESPIRATION RATE: 18 BRPM | DIASTOLIC BLOOD PRESSURE: 72 MMHG | TEMPERATURE: 98 F

## 2021-05-19 DIAGNOSIS — Z86.69 PERSONAL HISTORY OF OTHER DISEASES OF THE NERVOUS SYSTEM AND SENSE ORGANS: Chronic | ICD-10-CM

## 2021-05-19 PROCEDURE — 93010 ELECTROCARDIOGRAM REPORT: CPT

## 2021-05-19 PROCEDURE — 70450 CT HEAD/BRAIN W/O DYE: CPT | Mod: 26,MA

## 2021-05-19 PROCEDURE — 99285 EMERGENCY DEPT VISIT HI MDM: CPT

## 2021-05-19 NOTE — ED ADULT TRIAGE NOTE - CHIEF COMPLAINT QUOTE
pt arrives with reports of high blood sugar all day today. daughter reports shes been very sleepy and acts "like she is drunk, walks like shes drunk". noticed it at 6pm today but states she was at work all day and when she came home pt was still the same as yesterday. unable to determine last known well. pt reports she was sleeping in the day yesterday and when she woke up her speech and vision were off, and she was experiencing dizziness.

## 2021-05-19 NOTE — ED PROVIDER NOTE - PHYSICAL EXAMINATION
Const: Awake, alert and oriented. In no acute distress. Well appearing.  HEENT: NC/AT. Moist mucous membranes.  Eyes: No scleral icterus. EOMI.  Neck:. Soft and supple. Full ROM without pain.  Cardiac: Regular rate and regular rhythm. +S1/S2. Peripheral pulses 2+ and symmetric. No LE edema.  Resp: Speaking in full sentences. No evidence of respiratory distress. No wheezes, rales or rhonchi.  Abd: Soft, non-tender, non-distended. Normal bowel sounds in all 4 quadrants. No guarding or rebound.  Back: Spine midline and non-tender. No CVAT.  Skin: No rashes, abrasions or lacerations.  Lymph: No cervical lymphadenopathy.  Neuro: A&Ox2, moving all extremities symmetrically, follows commands, motor ariane upper 5/5 and lower ext 4/5, sensory symm and intact CN 2-12 grossly intact, nystagmus, no pronator drift

## 2021-05-19 NOTE — ED PROVIDER NOTE - CLINICAL SUMMARY MEDICAL DECISION MAKING FREE TEXT BOX
84yo female with pmh of DM, HTN, HLD, CAD on plavix, Cardiomegaly and dementia presents with hyperglycemia and weakness. 84yo female with pmh of DM, HTN, HLD, CAD on plavix, Cardiomegaly and dementia presents with hyperglycemia and weakness. Pt with ataxia here, not at baseline, and elevated lipase will admit for further management

## 2021-05-19 NOTE — ED PROVIDER NOTE - OBJECTIVE STATEMENT
84yo female with pmh of DM, HTN, HLD, CAD on plavix, Cardiomegaly and dementia presents with hyperglycemia and weakness. Daughter states for the past 2 days pt has been more sleepy than usual, difficult to arouse and also at times talking and walking like she's drunk. Daughter checked pt's FSG today it was 426 was told by PMD to give insulin levemir , first time pt has been given insulin, daughter rechecked ant it was 476. Pt denies any complaints.    gabapentin, hydralazine, clopidogrel, clonidine, glipizide, furosemide, memantine, carvedilol, januvia, jardiance, levocetrizine

## 2021-05-20 DIAGNOSIS — R27.0 ATAXIA, UNSPECIFIED: ICD-10-CM

## 2021-05-20 LAB
A1C WITH ESTIMATED AVERAGE GLUCOSE RESULT: 11.5 % — HIGH (ref 4–5.6)
ALBUMIN SERPL ELPH-MCNC: 3.2 G/DL — LOW (ref 3.3–5.2)
ALBUMIN SERPL ELPH-MCNC: 3.6 G/DL — SIGNIFICANT CHANGE UP (ref 3.3–5.2)
ALP SERPL-CCNC: 70 U/L — SIGNIFICANT CHANGE UP (ref 40–120)
ALP SERPL-CCNC: 85 U/L — SIGNIFICANT CHANGE UP (ref 40–120)
ALT FLD-CCNC: 12 U/L — SIGNIFICANT CHANGE UP
ALT FLD-CCNC: 16 U/L — SIGNIFICANT CHANGE UP
ANION GAP SERPL CALC-SCNC: 13 MMOL/L — SIGNIFICANT CHANGE UP (ref 5–17)
ANION GAP SERPL CALC-SCNC: 9 MMOL/L — SIGNIFICANT CHANGE UP (ref 5–17)
APPEARANCE UR: CLEAR — SIGNIFICANT CHANGE UP
AST SERPL-CCNC: 12 U/L — SIGNIFICANT CHANGE UP
AST SERPL-CCNC: 16 U/L — SIGNIFICANT CHANGE UP
BASE EXCESS BLDV CALC-SCNC: 2.7 MMOL/L — HIGH (ref -2–2)
BASOPHILS # BLD AUTO: 0.01 K/UL — SIGNIFICANT CHANGE UP (ref 0–0.2)
BASOPHILS NFR BLD AUTO: 0.1 % — SIGNIFICANT CHANGE UP (ref 0–2)
BILIRUB SERPL-MCNC: <0.2 MG/DL — LOW (ref 0.4–2)
BILIRUB SERPL-MCNC: <0.2 MG/DL — LOW (ref 0.4–2)
BILIRUB UR-MCNC: NEGATIVE — SIGNIFICANT CHANGE UP
BUN SERPL-MCNC: 41 MG/DL — HIGH (ref 8–20)
BUN SERPL-MCNC: 51 MG/DL — HIGH (ref 8–20)
CA-I SERPL-SCNC: 1.2 MMOL/L — SIGNIFICANT CHANGE UP (ref 1.15–1.33)
CALCIUM SERPL-MCNC: 8.6 MG/DL — SIGNIFICANT CHANGE UP (ref 8.6–10.2)
CALCIUM SERPL-MCNC: 9.1 MG/DL — SIGNIFICANT CHANGE UP (ref 8.6–10.2)
CHLORIDE BLDV-SCNC: 102 MMOL/L — SIGNIFICANT CHANGE UP (ref 98–107)
CHLORIDE SERPL-SCNC: 100 MMOL/L — SIGNIFICANT CHANGE UP (ref 98–107)
CHLORIDE SERPL-SCNC: 107 MMOL/L — SIGNIFICANT CHANGE UP (ref 98–107)
CHOLEST SERPL-MCNC: 161 MG/DL — SIGNIFICANT CHANGE UP
CO2 SERPL-SCNC: 24 MMOL/L — SIGNIFICANT CHANGE UP (ref 22–29)
CO2 SERPL-SCNC: 26 MMOL/L — SIGNIFICANT CHANGE UP (ref 22–29)
COLOR SPEC: YELLOW — SIGNIFICANT CHANGE UP
CREAT SERPL-MCNC: 2.03 MG/DL — HIGH (ref 0.5–1.3)
CREAT SERPL-MCNC: 2.56 MG/DL — HIGH (ref 0.5–1.3)
DIFF PNL FLD: NEGATIVE — SIGNIFICANT CHANGE UP
EOSINOPHIL # BLD AUTO: 0.31 K/UL — SIGNIFICANT CHANGE UP (ref 0–0.5)
EOSINOPHIL NFR BLD AUTO: 4.3 % — SIGNIFICANT CHANGE UP (ref 0–6)
EPI CELLS # UR: SIGNIFICANT CHANGE UP
ESTIMATED AVERAGE GLUCOSE: 283 MG/DL — HIGH (ref 68–114)
GAS PNL BLDV: 140 MMOL/L — SIGNIFICANT CHANGE UP (ref 135–145)
GAS PNL BLDV: SIGNIFICANT CHANGE UP
GAS PNL BLDV: SIGNIFICANT CHANGE UP
GLUCOSE BLDC GLUCOMTR-MCNC: 136 MG/DL — HIGH (ref 70–99)
GLUCOSE BLDC GLUCOMTR-MCNC: 143 MG/DL — HIGH (ref 70–99)
GLUCOSE BLDC GLUCOMTR-MCNC: 177 MG/DL — HIGH (ref 70–99)
GLUCOSE BLDC GLUCOMTR-MCNC: 188 MG/DL — HIGH (ref 70–99)
GLUCOSE BLDC GLUCOMTR-MCNC: 91 MG/DL — SIGNIFICANT CHANGE UP (ref 70–99)
GLUCOSE BLDC GLUCOMTR-MCNC: 97 MG/DL — SIGNIFICANT CHANGE UP (ref 70–99)
GLUCOSE BLDV-MCNC: 336 MG/DL — HIGH (ref 70–99)
GLUCOSE SERPL-MCNC: 168 MG/DL — HIGH (ref 70–99)
GLUCOSE SERPL-MCNC: 350 MG/DL — HIGH (ref 70–99)
GLUCOSE UR QL: 1000 MG/DL
HCO3 BLDV-SCNC: 26 MMOL/L — SIGNIFICANT CHANGE UP (ref 20–26)
HCT VFR BLD CALC: 36.3 % — SIGNIFICANT CHANGE UP (ref 34.5–45)
HCT VFR BLD CALC: 37.8 % — SIGNIFICANT CHANGE UP (ref 34.5–45)
HCT VFR BLDA CALC: 40 — SIGNIFICANT CHANGE UP (ref 39–50)
HDLC SERPL-MCNC: 39 MG/DL — LOW
HGB BLD CALC-MCNC: 13 G/DL — SIGNIFICANT CHANGE UP (ref 11.5–15.5)
HGB BLD-MCNC: 11.8 G/DL — SIGNIFICANT CHANGE UP (ref 11.5–15.5)
HGB BLD-MCNC: 12.1 G/DL — SIGNIFICANT CHANGE UP (ref 11.5–15.5)
IMM GRANULOCYTES NFR BLD AUTO: 0.3 % — SIGNIFICANT CHANGE UP (ref 0–1.5)
KETONES UR-MCNC: NEGATIVE — SIGNIFICANT CHANGE UP
LACTATE BLDV-MCNC: 0.8 MMOL/L — SIGNIFICANT CHANGE UP (ref 0.5–2)
LEUKOCYTE ESTERASE UR-ACNC: ABNORMAL
LIDOCAIN IGE QN: 223 U/L — HIGH (ref 22–51)
LIPID PNL WITH DIRECT LDL SERPL: 69 MG/DL — SIGNIFICANT CHANGE UP
LYMPHOCYTES # BLD AUTO: 1.31 K/UL — SIGNIFICANT CHANGE UP (ref 1–3.3)
LYMPHOCYTES # BLD AUTO: 18.4 % — SIGNIFICANT CHANGE UP (ref 13–44)
MAGNESIUM SERPL-MCNC: 2.3 MG/DL — SIGNIFICANT CHANGE UP (ref 1.6–2.6)
MAGNESIUM SERPL-MCNC: 2.5 MG/DL — SIGNIFICANT CHANGE UP (ref 1.8–2.6)
MCHC RBC-ENTMCNC: 28.7 PG — SIGNIFICANT CHANGE UP (ref 27–34)
MCHC RBC-ENTMCNC: 29 PG — SIGNIFICANT CHANGE UP (ref 27–34)
MCHC RBC-ENTMCNC: 32 GM/DL — SIGNIFICANT CHANGE UP (ref 32–36)
MCHC RBC-ENTMCNC: 32.5 GM/DL — SIGNIFICANT CHANGE UP (ref 32–36)
MCV RBC AUTO: 89.2 FL — SIGNIFICANT CHANGE UP (ref 80–100)
MCV RBC AUTO: 89.8 FL — SIGNIFICANT CHANGE UP (ref 80–100)
MONOCYTES # BLD AUTO: 0.68 K/UL — SIGNIFICANT CHANGE UP (ref 0–0.9)
MONOCYTES NFR BLD AUTO: 9.5 % — SIGNIFICANT CHANGE UP (ref 2–14)
NEUTROPHILS # BLD AUTO: 4.8 K/UL — SIGNIFICANT CHANGE UP (ref 1.8–7.4)
NEUTROPHILS NFR BLD AUTO: 67.4 % — SIGNIFICANT CHANGE UP (ref 43–77)
NITRITE UR-MCNC: NEGATIVE — SIGNIFICANT CHANGE UP
NON HDL CHOLESTEROL: 122 MG/DL — SIGNIFICANT CHANGE UP
OTHER CELLS CSF MANUAL: 14 ML/DL — LOW (ref 18–22)
PCO2 BLDV: 53 MMHG — HIGH (ref 35–50)
PH BLDV: 7.35 — SIGNIFICANT CHANGE UP (ref 7.32–7.43)
PH UR: 6 — SIGNIFICANT CHANGE UP (ref 5–8)
PHOSPHATE SERPL-MCNC: 3 MG/DL — SIGNIFICANT CHANGE UP (ref 2.4–4.7)
PLATELET # BLD AUTO: 214 K/UL — SIGNIFICANT CHANGE UP (ref 150–400)
PLATELET # BLD AUTO: 218 K/UL — SIGNIFICANT CHANGE UP (ref 150–400)
PO2 BLDV: 48 MMHG — HIGH (ref 25–45)
POTASSIUM BLDV-SCNC: 4.4 MMOL/L — SIGNIFICANT CHANGE UP (ref 3.4–4.5)
POTASSIUM SERPL-MCNC: 3.6 MMOL/L — SIGNIFICANT CHANGE UP (ref 3.5–5.3)
POTASSIUM SERPL-MCNC: 4.7 MMOL/L — SIGNIFICANT CHANGE UP (ref 3.5–5.3)
POTASSIUM SERPL-SCNC: 3.6 MMOL/L — SIGNIFICANT CHANGE UP (ref 3.5–5.3)
POTASSIUM SERPL-SCNC: 4.7 MMOL/L — SIGNIFICANT CHANGE UP (ref 3.5–5.3)
PROT SERPL-MCNC: 6.1 G/DL — LOW (ref 6.6–8.7)
PROT SERPL-MCNC: 6.8 G/DL — SIGNIFICANT CHANGE UP (ref 6.6–8.7)
PROT UR-MCNC: 30 MG/DL
RBC # BLD: 4.07 M/UL — SIGNIFICANT CHANGE UP (ref 3.8–5.2)
RBC # BLD: 4.21 M/UL — SIGNIFICANT CHANGE UP (ref 3.8–5.2)
RBC # FLD: 12.7 % — SIGNIFICANT CHANGE UP (ref 10.3–14.5)
RBC # FLD: 12.9 % — SIGNIFICANT CHANGE UP (ref 10.3–14.5)
RBC CASTS # UR COMP ASSIST: NEGATIVE /HPF — SIGNIFICANT CHANGE UP (ref 0–4)
SAO2 % BLDV: 80 % — SIGNIFICANT CHANGE UP
SARS-COV-2 RNA SPEC QL NAA+PROBE: SIGNIFICANT CHANGE UP
SODIUM SERPL-SCNC: 139 MMOL/L — SIGNIFICANT CHANGE UP (ref 135–145)
SODIUM SERPL-SCNC: 140 MMOL/L — SIGNIFICANT CHANGE UP (ref 135–145)
SP GR SPEC: 1.01 — SIGNIFICANT CHANGE UP (ref 1.01–1.02)
TRIGL SERPL-MCNC: 267 MG/DL — HIGH
TROPONIN T SERPL-MCNC: <0.01 NG/ML — SIGNIFICANT CHANGE UP (ref 0–0.06)
UROBILINOGEN FLD QL: NEGATIVE MG/DL — SIGNIFICANT CHANGE UP
WBC # BLD: 6.71 K/UL — SIGNIFICANT CHANGE UP (ref 3.8–10.5)
WBC # BLD: 7.13 K/UL — SIGNIFICANT CHANGE UP (ref 3.8–10.5)
WBC # FLD AUTO: 6.71 K/UL — SIGNIFICANT CHANGE UP (ref 3.8–10.5)
WBC # FLD AUTO: 7.13 K/UL — SIGNIFICANT CHANGE UP (ref 3.8–10.5)
WBC UR QL: SIGNIFICANT CHANGE UP

## 2021-05-20 PROCEDURE — 93306 TTE W/DOPPLER COMPLETE: CPT | Mod: 26

## 2021-05-20 PROCEDURE — 93880 EXTRACRANIAL BILAT STUDY: CPT | Mod: 26

## 2021-05-20 PROCEDURE — 74176 CT ABD & PELVIS W/O CONTRAST: CPT | Mod: 26,MA

## 2021-05-20 PROCEDURE — 70551 MRI BRAIN STEM W/O DYE: CPT | Mod: 26

## 2021-05-20 PROCEDURE — 99223 1ST HOSP IP/OBS HIGH 75: CPT

## 2021-05-20 PROCEDURE — 70450 CT HEAD/BRAIN W/O DYE: CPT | Mod: 26

## 2021-05-20 PROCEDURE — 71045 X-RAY EXAM CHEST 1 VIEW: CPT | Mod: 26

## 2021-05-20 RX ORDER — HYDRALAZINE HCL 50 MG
100 TABLET ORAL EVERY 8 HOURS
Refills: 0 | Status: DISCONTINUED | OUTPATIENT
Start: 2021-05-20 | End: 2021-05-21

## 2021-05-20 RX ORDER — ONDANSETRON 8 MG/1
4 TABLET, FILM COATED ORAL ONCE
Refills: 0 | Status: COMPLETED | OUTPATIENT
Start: 2021-05-20 | End: 2021-05-20

## 2021-05-20 RX ORDER — METFORMIN HYDROCHLORIDE 850 MG/1
1 TABLET ORAL
Qty: 0 | Refills: 0 | DISCHARGE

## 2021-05-20 RX ORDER — DOXAZOSIN MESYLATE 4 MG
1 TABLET ORAL
Qty: 0 | Refills: 0 | DISCHARGE

## 2021-05-20 RX ORDER — GABAPENTIN 400 MG/1
200 CAPSULE ORAL THREE TIMES A DAY
Refills: 0 | Status: DISCONTINUED | OUTPATIENT
Start: 2021-05-20 | End: 2021-05-21

## 2021-05-20 RX ORDER — IOHEXOL 300 MG/ML
30 INJECTION, SOLUTION INTRAVENOUS ONCE
Refills: 0 | Status: COMPLETED | OUTPATIENT
Start: 2021-05-20 | End: 2021-05-20

## 2021-05-20 RX ORDER — INSULIN LISPRO 100/ML
VIAL (ML) SUBCUTANEOUS
Refills: 0 | Status: DISCONTINUED | OUTPATIENT
Start: 2021-05-20 | End: 2021-05-25

## 2021-05-20 RX ORDER — INSULIN GLARGINE 100 [IU]/ML
15 INJECTION, SOLUTION SUBCUTANEOUS AT BEDTIME
Refills: 0 | Status: DISCONTINUED | OUTPATIENT
Start: 2021-05-20 | End: 2021-05-24

## 2021-05-20 RX ORDER — ASPIRIN/CALCIUM CARB/MAGNESIUM 324 MG
325 TABLET ORAL DAILY
Refills: 0 | Status: DISCONTINUED | OUTPATIENT
Start: 2021-05-20 | End: 2021-05-21

## 2021-05-20 RX ORDER — SODIUM CHLORIDE 9 MG/ML
1000 INJECTION, SOLUTION INTRAVENOUS
Refills: 0 | Status: DISCONTINUED | OUTPATIENT
Start: 2021-05-20 | End: 2021-05-25

## 2021-05-20 RX ORDER — FERROUS SULFATE 325(65) MG
325 TABLET ORAL DAILY
Refills: 0 | Status: DISCONTINUED | OUTPATIENT
Start: 2021-05-20 | End: 2021-05-25

## 2021-05-20 RX ORDER — CLOPIDOGREL BISULFATE 75 MG/1
75 TABLET, FILM COATED ORAL DAILY
Refills: 0 | Status: DISCONTINUED | OUTPATIENT
Start: 2021-05-20 | End: 2021-05-25

## 2021-05-20 RX ORDER — DEXTROSE 50 % IN WATER 50 %
25 SYRINGE (ML) INTRAVENOUS ONCE
Refills: 0 | Status: DISCONTINUED | OUTPATIENT
Start: 2021-05-20 | End: 2021-05-25

## 2021-05-20 RX ORDER — DEXTROSE 50 % IN WATER 50 %
12.5 SYRINGE (ML) INTRAVENOUS ONCE
Refills: 0 | Status: DISCONTINUED | OUTPATIENT
Start: 2021-05-20 | End: 2021-05-25

## 2021-05-20 RX ORDER — PREGABALIN 225 MG/1
1000 CAPSULE ORAL DAILY
Refills: 0 | Status: DISCONTINUED | OUTPATIENT
Start: 2021-05-20 | End: 2021-05-25

## 2021-05-20 RX ORDER — ALLOPURINOL 300 MG
1 TABLET ORAL
Qty: 0 | Refills: 0 | DISCHARGE

## 2021-05-20 RX ORDER — LOSARTAN POTASSIUM 100 MG/1
1 TABLET, FILM COATED ORAL
Qty: 0 | Refills: 0 | DISCHARGE

## 2021-05-20 RX ORDER — PYRIDOXINE HCL (VITAMIN B6) 100 MG
100 TABLET ORAL DAILY
Refills: 0 | Status: DISCONTINUED | OUTPATIENT
Start: 2021-05-20 | End: 2021-05-25

## 2021-05-20 RX ORDER — SIMVASTATIN 20 MG/1
1 TABLET, FILM COATED ORAL
Qty: 0 | Refills: 0 | DISCHARGE

## 2021-05-20 RX ORDER — LORATADINE 10 MG/1
10 TABLET ORAL DAILY
Refills: 0 | Status: DISCONTINUED | OUTPATIENT
Start: 2021-05-20 | End: 2021-05-25

## 2021-05-20 RX ORDER — ATORVASTATIN CALCIUM 80 MG/1
80 TABLET, FILM COATED ORAL AT BEDTIME
Refills: 0 | Status: DISCONTINUED | OUTPATIENT
Start: 2021-05-20 | End: 2021-05-25

## 2021-05-20 RX ORDER — SITAGLIPTIN 50 MG/1
1 TABLET, FILM COATED ORAL
Qty: 0 | Refills: 0 | DISCHARGE

## 2021-05-20 RX ORDER — INSULIN LISPRO 100/ML
VIAL (ML) SUBCUTANEOUS AT BEDTIME
Refills: 0 | Status: DISCONTINUED | OUTPATIENT
Start: 2021-05-20 | End: 2021-05-25

## 2021-05-20 RX ORDER — CARVEDILOL PHOSPHATE 80 MG/1
12.5 CAPSULE, EXTENDED RELEASE ORAL EVERY 12 HOURS
Refills: 0 | Status: DISCONTINUED | OUTPATIENT
Start: 2021-05-20 | End: 2021-05-22

## 2021-05-20 RX ORDER — MEMANTINE HYDROCHLORIDE 10 MG/1
5 TABLET ORAL DAILY
Refills: 0 | Status: DISCONTINUED | OUTPATIENT
Start: 2021-05-20 | End: 2021-05-25

## 2021-05-20 RX ORDER — FUROSEMIDE 40 MG
40 TABLET ORAL
Refills: 0 | Status: DISCONTINUED | OUTPATIENT
Start: 2021-05-20 | End: 2021-05-25

## 2021-05-20 RX ORDER — SODIUM CHLORIDE 9 MG/ML
1000 INJECTION INTRAMUSCULAR; INTRAVENOUS; SUBCUTANEOUS ONCE
Refills: 0 | Status: COMPLETED | OUTPATIENT
Start: 2021-05-20 | End: 2021-05-20

## 2021-05-20 RX ORDER — GLUCAGON INJECTION, SOLUTION 0.5 MG/.1ML
1 INJECTION, SOLUTION SUBCUTANEOUS ONCE
Refills: 0 | Status: DISCONTINUED | OUTPATIENT
Start: 2021-05-20 | End: 2021-05-25

## 2021-05-20 RX ORDER — FAMOTIDINE 10 MG/ML
1 INJECTION INTRAVENOUS
Qty: 0 | Refills: 0 | DISCHARGE

## 2021-05-20 RX ORDER — FUROSEMIDE 40 MG
1 TABLET ORAL
Qty: 0 | Refills: 0 | DISCHARGE

## 2021-05-20 RX ORDER — HUMAN INSULIN 100 [IU]/ML
15 INJECTION, SUSPENSION SUBCUTANEOUS ONCE
Refills: 0 | Status: COMPLETED | OUTPATIENT
Start: 2021-05-20 | End: 2021-05-20

## 2021-05-20 RX ORDER — DEXTROSE 50 % IN WATER 50 %
15 SYRINGE (ML) INTRAVENOUS ONCE
Refills: 0 | Status: DISCONTINUED | OUTPATIENT
Start: 2021-05-20 | End: 2021-05-25

## 2021-05-20 RX ADMIN — CLOPIDOGREL BISULFATE 75 MILLIGRAM(S): 75 TABLET, FILM COATED ORAL at 11:15

## 2021-05-20 RX ADMIN — GABAPENTIN 200 MILLIGRAM(S): 400 CAPSULE ORAL at 06:34

## 2021-05-20 RX ADMIN — LORATADINE 10 MILLIGRAM(S): 10 TABLET ORAL at 11:15

## 2021-05-20 RX ADMIN — SODIUM CHLORIDE 1000 MILLILITER(S): 9 INJECTION INTRAMUSCULAR; INTRAVENOUS; SUBCUTANEOUS at 04:35

## 2021-05-20 RX ADMIN — GABAPENTIN 200 MILLIGRAM(S): 400 CAPSULE ORAL at 22:18

## 2021-05-20 RX ADMIN — MEMANTINE HYDROCHLORIDE 5 MILLIGRAM(S): 10 TABLET ORAL at 11:15

## 2021-05-20 RX ADMIN — Medication 100 MILLIGRAM(S): at 05:58

## 2021-05-20 RX ADMIN — Medication 0.2 MILLIGRAM(S): at 05:58

## 2021-05-20 RX ADMIN — Medication 100 MILLIGRAM(S): at 15:39

## 2021-05-20 RX ADMIN — INSULIN GLARGINE 15 UNIT(S): 100 INJECTION, SOLUTION SUBCUTANEOUS at 22:18

## 2021-05-20 RX ADMIN — Medication 325 MILLIGRAM(S): at 11:15

## 2021-05-20 RX ADMIN — CARVEDILOL PHOSPHATE 12.5 MILLIGRAM(S): 80 CAPSULE, EXTENDED RELEASE ORAL at 06:33

## 2021-05-20 RX ADMIN — Medication 40 MILLIGRAM(S): at 17:28

## 2021-05-20 RX ADMIN — ATORVASTATIN CALCIUM 80 MILLIGRAM(S): 80 TABLET, FILM COATED ORAL at 22:18

## 2021-05-20 RX ADMIN — HUMAN INSULIN 15 UNIT(S): 100 INJECTION, SUSPENSION SUBCUTANEOUS at 06:44

## 2021-05-20 RX ADMIN — IOHEXOL 30 MILLILITER(S): 300 INJECTION, SOLUTION INTRAVENOUS at 01:41

## 2021-05-20 RX ADMIN — CARVEDILOL PHOSPHATE 12.5 MILLIGRAM(S): 80 CAPSULE, EXTENDED RELEASE ORAL at 17:28

## 2021-05-20 RX ADMIN — Medication 40 MILLIGRAM(S): at 05:58

## 2021-05-20 RX ADMIN — PREGABALIN 1000 MICROGRAM(S): 225 CAPSULE ORAL at 11:16

## 2021-05-20 RX ADMIN — Medication 2: at 07:56

## 2021-05-20 RX ADMIN — Medication 100 MILLIGRAM(S): at 11:17

## 2021-05-20 RX ADMIN — GABAPENTIN 200 MILLIGRAM(S): 400 CAPSULE ORAL at 15:39

## 2021-05-20 NOTE — PHYSICAL THERAPY INITIAL EVALUATION ADULT - ADDITIONAL COMMENTS
per patient she is without steps to enter tho there are steps in the home. She lives on the ground floor and her daughter will be there and able to assist if needed. Pt owns and occasionally uses a SAC when she ambulates.

## 2021-05-20 NOTE — ED ADULT NURSE NOTE - NSIMPLEMENTINTERV_GEN_ALL_ED
Implemented All Fall with Harm Risk Interventions:  [place] to call system. Call bell, personal items and telephone within reach. Instruct patient to call for assistance. Room bathroom lighting operational. Non-slip footwear when patient is off stretcher. Physically safe environment: no spills, clutter or unnecessary equipment. Stretcher in lowest position, wheels locked, appropriate side rails in place. Provide visual cue, wrist band, yellow gown, etc. Monitor gait and stability. Monitor for mental status changes and reorient to person, place, and time. Review medications for side effects contributing to fall risk. Reinforce activity limits and safety measures with patient and family. Provide visual clues: red socks.

## 2021-05-20 NOTE — H&P ADULT - NSHPPHYSICALEXAM_GEN_ALL_CORE
Vital Signs Last 24 Hrs  T(C): 36.8 (20 May 2021 05:16), Max: 36.8 (19 May 2021 22:13)  T(F): 98.2 (20 May 2021 05:16), Max: 98.2 (19 May 2021 22:13)  HR: 48 (20 May 2021 05:16) (48 - 59)  BP: 181/77 (20 May 2021 05:16) (181/77 - 185/72)  BP(mean): --  RR: 18 (20 May 2021 05:16) (18 - 18)  SpO2: 97% (20 May 2021 05:16) (97% - 97%)    GENERAL: NAD, appears stated age  HEENT:  Atraumatic, Normocephalic, MMM, no oropharyngeal lesions  EYES: EOMI, PERRLA, conjunctiva and sclera clear  NECK: Supple, No JVD, no throat tenderness.  CHEST/LUNG: Clear to auscultation bilaterally; No wheezes, rales, or rhonchi  HEART: Regular rate and rhythm; No murmurs, rubs, or gallops  ABDOMEN: Soft, Nontender, Nondistended; Bowel sounds present  EXTREMITIES:  2+ Peripheral Pulses, No clubbing, cyanosis, or edema  PSYCH: AAOx3, normal affect  NEUROLOGY: moves all extremities spontaneously. no sensory deficits, no pronatro drift  SKIN: No rashes or lesions

## 2021-05-20 NOTE — CONSULT NOTE ADULT - SUBJECTIVE AND OBJECTIVE BOX
[facility]                                     Neurology at [place]                                 Rajan Mcfadden, & Bird                                  [address]                                        [place], [place], [address]                                             [phone]    CC: dysarthria  HPI:  The patient is a 85y Female who presented with slurred speech and unsteadiness in setting of hyperglycemia.  She denied weakness, numbness and language involvement.  She is now doing better.  She has mild dementia per report, but is fully alert and oriented.  Her stroke risk factors include HTN, HLD and DM.  Neurology is asked to evaluate.    PAST MEDICAL & SURGICAL HISTORY:  HTN (hypertension)    High cholesterol    DM (diabetes mellitus)    Cardiomegaly    PAD (peripheral artery disease)    H/O cataract        MEDICATIONS  (STANDING):  aspirin enteric coated 325 milliGRAM(s) Oral daily  atorvastatin 80 milliGRAM(s) Oral at bedtime  carvedilol 12.5 milliGRAM(s) Oral every 12 hours  cloNIDine 0.2 milliGRAM(s) Oral every 8 hours  clopidogrel Tablet 75 milliGRAM(s) Oral daily  cyanocobalamin 1000 MICROGram(s) Oral daily  dextrose 40% Gel 15 Gram(s) Oral once  dextrose 5%. 1000 milliLiter(s) (50 mL/Hr) IV Continuous <Continuous>  dextrose 5%. 1000 milliLiter(s) (100 mL/Hr) IV Continuous <Continuous>  dextrose 50% Injectable 25 Gram(s) IV Push once  dextrose 50% Injectable 12.5 Gram(s) IV Push once  dextrose 50% Injectable 25 Gram(s) IV Push once  ferrous    sulfate 325 milliGRAM(s) Oral daily  furosemide    Tablet 40 milliGRAM(s) Oral two times a day  gabapentin 200 milliGRAM(s) Oral three times a day  glucagon  Injectable 1 milliGRAM(s) IntraMuscular once  hydrALAZINE 100 milliGRAM(s) Oral every 8 hours  insulin glargine Injectable (LANTUS) 15 Unit(s) SubCutaneous at bedtime  insulin lispro (ADMELOG) corrective regimen sliding scale   SubCutaneous three times a day before meals  insulin lispro (ADMELOG) corrective regimen sliding scale   SubCutaneous at bedtime  loratadine 10 milliGRAM(s) Oral daily  memantine 5 milliGRAM(s) Oral daily  pyridoxine 100 milliGRAM(s) Oral daily    MEDICATIONS  (PRN):      Allergies    No Known Allergies    Intolerances        SOCIAL HISTORY:  no tob,   no alcohol   no drugs    FAMILY HISTORY:  No stroke in either parent      ROS: 14 point ROS negative other than what is present in HPI or below    Vital Signs Last 24 Hrs  T(C): 36.4 (20 May 2021 11:34), Max: 36.8 (19 May 2021 22:13)  T(F): 97.5 (20 May 2021 11:34), Max: 98.2 (19 May 2021 22:13)  HR: 51 (20 May 2021 11:34) (48 - 59)  BP: 169/54 (20 May 2021 11:34) (136/52 - 185/72)  BP(mean): 84 (20 May 2021 11:34) (74 - 84)  RR: 16 (20 May 2021 11:34) (16 - 18)  SpO2: 100% (20 May 2021 11:34) (97% - 100%)      General: NAD    Detailed Neurologic Exam:    Mental status: The patient is awake and alert and has normal attention span.  The patient is fully oriented in 3 spheres. The patient is oriented to current events. The patient is able to name objects, follow commands, repeat sentences.    Cranial nerves: Pupils equal and react symmetrically to light. There is no visual field deficit to confrontation. Extraocular motion is full with no nystagmus. There is no ptosis. Facial sensation is intact. Facial musculature is symmetric. Palate elevates symmetrically. Tongue is midline.    Motor: There is normal bulk and tone.  There is no tremor.  Strength is 5/5 in the right arm and leg.   Strength is 5/5 in the left arm and leg.    Sensation: Intact to light touch and pin in 4 extremities    Reflexes: 1-2+ throughout and plantar responses are flexor.    Cerebellar: There is no dysmetria on finger to nose testing.    Gait : deferred    LABS:                         11.8   6.71  )-----------( 218      ( 20 May 2021 08:57 )             36.3       05-20    140  |  107  |  41.0<H>  ----------------------------<  168<H>  3.6   |  24.0  |  2.03<H>    Ca    8.6      20 May 2021 08:57  Phos  3.0     05-20  Mg     2.3     05-20    TPro  6.1<L>  /  Alb  3.2<L>  /  TBili  <0.2<L>  /  DBili  x   /  AST  12  /  ALT  12  /  AlkPhos  70  05-20    Lipid Profile (05.20.21 @ 08:57)    Cholesterol, Serum: 161 mg/dL    Triglycerides, Serum: 267 mg/dL    HDL Cholesterol, Serum: 39 mg/dL    Non HDL Cholesterol: 122:    LDL Cholesterol Calculated: 69 mg/dL        RADIOLOGY & ADDITIONAL STUDIES (independently reviewed unless otherwise noted):  CT head did not show acute CVA, mass or blood    < from: TTE Echo Complete w/o Contrast w/ Doppler (05.20.21 @ 09:18) >    Summary:   1. Mildly enlarged left atrium.   2. There is moderate concentric left ventricular hypertrophy. 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   3. Hyperdynamic wall motion. Left ventricular ejection fraction, by visual estimation, is 70 to 75%. Grade I diastolic dysfunction.   4. Normal right atrial size.   5. Mild aortic regurgitation.   6. Paradoxical low-flow, low-gradient severe aortic stenosis.   7. There is no evidence of pericardial effusion.                                      [facility]                                     Neurology at [place]                                 Rajan Mcfadden, & Bird                                  [address]                                        [place], [place], [address]                                             [phone]    CC: dysarthria  HPI:  The patient is a 85y Female who presented with slurred speech and unsteadiness in setting of hyperglycemia.  She denied weakness, numbness and language involvement.  She is now doing better.  She has mild dementia per report, but is fully alert and oriented.  Her stroke risk factors include HTN, HLD and DM.  Neurology is asked to evaluate.    PAST MEDICAL & SURGICAL HISTORY:  HTN (hypertension)    High cholesterol    DM (diabetes mellitus)    Cardiomegaly    PAD (peripheral artery disease)    H/O cataract        MEDICATIONS  (STANDING):  aspirin enteric coated 325 milliGRAM(s) Oral daily  atorvastatin 80 milliGRAM(s) Oral at bedtime  carvedilol 12.5 milliGRAM(s) Oral every 12 hours  cloNIDine 0.2 milliGRAM(s) Oral every 8 hours  clopidogrel Tablet 75 milliGRAM(s) Oral daily  cyanocobalamin 1000 MICROGram(s) Oral daily  dextrose 40% Gel 15 Gram(s) Oral once  dextrose 5%. 1000 milliLiter(s) (50 mL/Hr) IV Continuous <Continuous>  dextrose 5%. 1000 milliLiter(s) (100 mL/Hr) IV Continuous <Continuous>  dextrose 50% Injectable 25 Gram(s) IV Push once  dextrose 50% Injectable 12.5 Gram(s) IV Push once  dextrose 50% Injectable 25 Gram(s) IV Push once  ferrous    sulfate 325 milliGRAM(s) Oral daily  furosemide    Tablet 40 milliGRAM(s) Oral two times a day  gabapentin 200 milliGRAM(s) Oral three times a day  glucagon  Injectable 1 milliGRAM(s) IntraMuscular once  hydrALAZINE 100 milliGRAM(s) Oral every 8 hours  insulin glargine Injectable (LANTUS) 15 Unit(s) SubCutaneous at bedtime  insulin lispro (ADMELOG) corrective regimen sliding scale   SubCutaneous three times a day before meals  insulin lispro (ADMELOG) corrective regimen sliding scale   SubCutaneous at bedtime  loratadine 10 milliGRAM(s) Oral daily  memantine 5 milliGRAM(s) Oral daily  pyridoxine 100 milliGRAM(s) Oral daily    MEDICATIONS  (PRN):      Allergies    No Known Allergies    Intolerances        SOCIAL HISTORY:  no tob,   no alcohol   no drugs    FAMILY HISTORY:  No stroke in either parent      ROS: 14 point ROS negative other than what is present in HPI or below    Vital Signs Last 24 Hrs  T(C): 36.4 (20 May 2021 11:34), Max: 36.8 (19 May 2021 22:13)  T(F): 97.5 (20 May 2021 11:34), Max: 98.2 (19 May 2021 22:13)  HR: 51 (20 May 2021 11:34) (48 - 59)  BP: 169/54 (20 May 2021 11:34) (136/52 - 185/72)  BP(mean): 84 (20 May 2021 11:34) (74 - 84)  RR: 16 (20 May 2021 11:34) (16 - 18)  SpO2: 100% (20 May 2021 11:34) (97% - 100%)      General: NAD    Detailed Neurologic Exam:    Mental status: The patient is awake and alert and has normal attention span.  The patient is fully oriented in 3 spheres. The patient is oriented to current events. The patient is able to name objects, follow commands, repeat sentences.    Cranial nerves: Pupils irregular and non reactive at 6mm There is no visual field deficit to confrontation. Extraocular motion is full with no nystagmus. There is no ptosis. Facial sensation is intact. Facial musculature is symmetric. Palate elevates symmetrically. Tongue is midline.    Motor: There is normal bulk and tone.  There is no tremor.  Strength is 5/5 in the right arm and leg.   Strength is 5/5 in the left arm and leg.    Sensation: Intact to light touch and pin in 4 extremities    Reflexes: 1-2+ throughout and plantar responses are flexor.    Cerebellar: There is no dysmetria on finger to nose testing.    Gait : deferred    LABS:                         11.8   6.71  )-----------( 218      ( 20 May 2021 08:57 )             36.3       05-20    140  |  107  |  41.0<H>  ----------------------------<  168<H>  3.6   |  24.0  |  2.03<H>    Ca    8.6      20 May 2021 08:57  Phos  3.0     05-20  Mg     2.3     05-20    TPro  6.1<L>  /  Alb  3.2<L>  /  TBili  <0.2<L>  /  DBili  x   /  AST  12  /  ALT  12  /  AlkPhos  70  05-20    Lipid Profile (05.20.21 @ 08:57)    Cholesterol, Serum: 161 mg/dL    Triglycerides, Serum: 267 mg/dL    HDL Cholesterol, Serum: 39 mg/dL    Non HDL Cholesterol: 122:    LDL Cholesterol Calculated: 69 mg/dL        RADIOLOGY & ADDITIONAL STUDIES (independently reviewed unless otherwise noted):  CT head did not show acute CVA, mass or blood    < from: TTE Echo Complete w/o Contrast w/ Doppler (05.20.21 @ 09:18) >    Summary:   1. Mildly enlarged left atrium.   2. There is moderate concentric left ventricular hypertrophy. 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   3. Hyperdynamic wall motion. Left ventricular ejection fraction, by visual estimation, is 70 to 75%. Grade I diastolic dysfunction.   4. Normal right atrial size.   5. Mild aortic regurgitation.   6. Paradoxical low-flow, low-gradient severe aortic stenosis.   7. There is no evidence of pericardial effusion.

## 2021-05-20 NOTE — H&P ADULT - NSICDXPASTMEDICALHX_GEN_ALL_CORE_FT
PAST MEDICAL HISTORY:  Cardiomegaly     DM (diabetes mellitus)     High cholesterol     HTN (hypertension)     PAD (peripheral artery disease)

## 2021-05-20 NOTE — H&P ADULT - ASSESSMENT
85F hx DM2, CAD, PAD s/p femoral PCI, mild dementia, HTN, HLD, CKD presents with worsening weakness and slurred speech, hyperglycemia.      #Slurred speech #Ataxia  -symptoms resolving  -cth negative  -initial code stroke, but out of window and symptoms improving  -nihss 0  -will give aspirin 325  -start high intensity statin  -check mri  -neuro consult  -admit to neuro  -q2 neuro checks  -check lipid panel and hba1c    #DM2 with hyperglycemia  -will increase patient to lantus 15 u qhs  -moderate iss w/ meals  -hold oral hyperglycemic  -adjust as needed based on FS    #Elevated lipase  -c/o mild nausea  -no abd pain or vomiting  -ct negative for pancreatitis  -perhaps elevated due to poorly controlled glucose    #HTN  -c/w clonidine, hydralazine    #CAD, PAD  -c/w aspirin and plavix  -c/w carvedilol    #CKD  -creatinine at baseline  -renal dose meds  -continue to monitor    #dvt ppx  -heparin sq

## 2021-05-20 NOTE — CONSULT NOTE ADULT - ASSESSMENT
The patient is a 85y Female who is followed by neurology because of transient dysarthria    TIA  possible TIA vs effect of hyperglycemia  doing better now    - check carotid duplex  - echocardiogram as above  - PT/OT  - speech and swallow therapy  - ASA 81  - LDL=69, continue  lipitor  - DVT prophylaxis    will follow with you    Jer Tolentino MD PhD   [id number]  
negative...

## 2021-05-20 NOTE — CHART NOTE - NSCHARTNOTEFT_GEN_A_CORE
Call placed to [facility](pt primary cardiologist is Dr. Ayala) for concerns of amyloidosis on echo and new stroke. Spoke to Dr. Barrios regarding patient and he stated patient never had amyloidosis in the past and low suspicion for this finding. Stated even if it was amyloidosis, performing biopsy would be futile and not change the course of treatment given her age. In regards to new infarcts, he prefers to watch the patient on tele for an additional 24 hours. If any concern for Paroxsymal Afib, anticoagulation would of course be recommended. He states she wore a holter monitor last year with no Atrial Fibrillation, RSR. Depending on neuro and hospital course, may consider implantable loop recorder. Dr. Haque will attempt to see patient today. Rotation Flap Text: The defect edges were debeveled with a #15 scalpel blade.  Given the location of the defect, shape of the defect and the proximity to free margins a rotation flap was deemed most appropriate.  Using a sterile surgical marker, an appropriate rotation flap was drawn incorporating the defect and placing the expected incisions within the relaxed skin tension lines where possible.    The area thus outlined was incised deep to adipose tissue with a #15 scalpel blade.  The skin margins were undermined to an appropriate distance in all directions utilizing iris scissors.

## 2021-05-20 NOTE — ED ADULT NURSE REASSESSMENT NOTE - NS ED NURSE REASSESS COMMENT FT1
pt handed off to [facility]  in stable condition. Pt oriented to unit, plan of care explained. call bell system explained to pt. no apparent distress noted at this time.

## 2021-05-20 NOTE — H&P ADULT - NSHPLABSRESULTS_GEN_ALL_CORE
12.1   7.13  )-----------( 214      ( 20 May 2021 00:18 )             37.8       139  |  100  |  51.0<H>  ----------------------------<  350<H>  4.7   |  26.0  |  2.56<H>    Ca    9.1      20 May 2021 00:18  Mg     2.5         TPro  6.8  /  Alb  3.6  /  TBili  <0.2<L>  /  DBili  x   /  AST  16  /  ALT  16  /  AlkPhos  85  -        Urinalysis Basic - ( 20 May 2021 00:27 )    Color: Yellow / Appearance: Clear / S.015 / pH: x  Gluc: x / Ketone: Negative  / Bili: Negative / Urobili: Negative mg/dL   Blood: x / Protein: 30 mg/dL / Nitrite: Negative   Leuk Esterase: Small / RBC: Negative /HPF / WBC 0-2   Sq Epi: x / Non Sq Epi: Occasional / Bacteria: x    Lactate Trend    CARDIAC MARKERS ( 20 May 2021 00:18 )  x     / <0.01 ng/mL / x     / x     / x        CAPILLARY BLOOD GLUCOSE  POCT Blood Glucose.: 304 mg/dL (20 May 2021 00:59)    RADIOLOGY, EKG & ADDITIONAL TESTS: Reviewed.     < from: CT Abdomen and Pelvis w/ Oral Cont (21 @ 03:01) >    FINDINGS:  LOWER CHEST: Minimal scattered subsegmental atelectasis. Aortic valvular calcification.    LIVER: Within normal limits.  BILE DUCTS: Normal caliber.  GALLBLADDER: Within normal limits.  SPLEEN: Within normal limits.  PANCREAS: Within normal limits.  ADRENALS: Within normal limits.  KIDNEYS/URETERS: Left renal atrophy. No hydronephrosis.    BLADDER: Within normal limits.  REPRODUCTIVE ORGANS: Uterus and adnexa within normal limits.    BOWEL: No bowel obstruction. Colonic diverticulosis. Appendix is normal.  PERITONEUM: No ascites.  VESSELS: Atherosclerotic changes.  RETROPERITONEUM/LYMPH NODES: No lymphadenopathy.  ABDOMINAL WALL: Within normal limits.  BONES: Degenerative changes.    IMPRESSION:  No acute intra-abdominal pathology on CT.    < end of copied text >

## 2021-05-20 NOTE — ED ADULT NURSE NOTE - OBJECTIVE STATEMENT
Pt AAOX2, PT c/o weakness. Daughter states for the past 2 days pt has been more sleepy than usual, difficult to arouse and also at times talking and walking like she's drunk. Daughter checked pt's FSG today it was 426 was told by PMD to give insulin levemir , first time pt has been given insulin, daughter rechecked ant it was 476.

## 2021-05-20 NOTE — H&P ADULT - HISTORY OF PRESENT ILLNESS
85F hx DM2, CAD, PAD s/p femoral PCI, mild dementia, HTN, HLD, CKD presents with worsening weakness and slurred speech. Daughter at bedside assisted with history. She states that her mothers glucose has been poorly controlled and recently decdied to start patient on levemir. She was started on 8 units qhs to increase to 10. 2 nights ago glucose found to be 420. Daughter gave insulin, but glucose continued to rise. She also noticed mom to be generally weak, slurring her words and having unsteady gait. These symptoms continued and glucose continued it to be elevated prompting her to bring her to ER.     In ER, CtH negative. Daughter states symptoms improving and mother near baseline. Pt complains of nausea and generalized weakness. Deneis focal weakness or numbness. Denies other complaints.

## 2021-05-20 NOTE — CHART NOTE - NSCHARTNOTEFT_GEN_A_CORE
Patient seen and examined  new cva on mri  echo reviewed, concern for amyloid  cards consulted [facility]  Neuro recs appreciated, Carotid duplex ordered  rest of plan per h&P from today

## 2021-05-20 NOTE — CHART NOTE - NSCHARTNOTEFT_GEN_A_CORE
Notified by RN pt c/o feeling "dizzy." Patient is an  y/o F PMH Notified by RN pt c/o feeling "dizzy." Patient is an  y/o F PMH  Vital Signs   T(F): 98.5 F rectal  HR: 58   BP: 151/41   RR: 12 even and unlabored   SpO2: 97% on RA Notified by RN pt c/o feeling "dizzy." Patient is an 84 y/o F PMH DM2, CAD, PAD s/p femoral PCI, mild dementia, HTN, HLD, CKD presented to ED with c/o weakness and slurred speech found to have small Right occipital lobe infarct on MRI 5/20. Per documentation symptoms resolving. Patient seen at bedside, Welsh speaking,  Quang funes. Patient alert and oriented to person, place and time. Patient admits to feeling lightheaded when she opens her eyes and nausea. Patient denies headache, sensations of the room spinning, vomiting, abd pain or discomfort at the time of exam.    PHYSICAL EXAM-  Vital Signs   T(F): 98.5 F rectal  HR: 58 sinus rhythm  BP: 151/41   RR: 12 even and unlabored   SpO2: 97% on RA    GENERAL: calm  HEAD: Facial musculature symmetric, facial sensation intact, tongue midline  EYES: Pupils 7mm dilated and fixed B/L, pupils nonreactive to light and accomodation B/L, EOMI, no ptosis, conjunctiva and sclera clear  NECK: Supple, No JVD  CHEST/LUNG: CTA bilaterally. No wheezing, rhonchi, rales  HEART: Regular rate and rhythm; No rubs, or gallops  ABDOMEN: Soft, Nontender, Nondistended. Bowel sounds present x4 quads  EXTREMITIES:  2+ Peripheral Pulses, No clubbing, cyanosis, or edema.  NERVOUS SYSTEM:  Alert & Oriented X3, Motor Strength 5/5 B/L upper and lower extremities. FROM active and passive B/L upper and lower extremities. No tremor. Distal sensations intact.    Bedside glucose check: 136.    A/P: 84 y/o F admitted for weakness and slurred speech found to have acute CVA seen for c/o lightheadedness.  - STAT CTH R/O hemorrhagic conversion/acute changes. Called down to CT scan, will take patient when room available.  - Zofran 4mg IVP x1 dose for nausea.  - Continue Q2H neuro checks.   - Instructed RN to hold bedtime dose of hydralazine 100mg PO at this time. Will monitor BP and address accordingly. Repeat /30, P 59.  - Discussed pt with Dr. Connolly, in agreement with plan. Recommend to F/U CT scan results with Neurology. Will f/u with Neurology.  - RN concerned  and Lantus to be administered tonight. Instructed to administer and perform Q4H FS x2 overnight. Monitor for s/s hyper/hypoglycemia.  - RN instructed to continue to monitor pt and notify provider of any changes in pt status.  - PMD will be notified in AM. Notified by RN pt c/o feeling "dizzy." Patient is an 86 y/o F PMH DM2, CAD, PAD s/p femoral PCI, mild dementia, HTN, HLD, CKD presented to ED with c/o weakness and slurred speech found to have small Right occipital lobe infarct on MRI 5/20. Per documentation symptoms resolving. Patient seen at bedside, Vietnamese speaking,  Quang funes. Patient alert and oriented to person, place and time. Patient admits to feeling lightheaded when she opens her eyes and nausea. Patient denies headache, sensations of the room spinning, vomiting, abd pain or discomfort at the time of exam.    PHYSICAL EXAM-  Vital Signs   T(F): 98.5 F rectal  HR: 58 sinus rhythm  BP: 151/41   RR: 12 even and unlabored   SpO2: 97% on RA    GENERAL: calm  HEAD: Facial musculature symmetric, facial sensation intact, tongue midline  EYES: right Pupil 7mm dilated and fixed, Left pupil 5mm fixed, pupils nonreactive to light and accomodation B/L, EOMI, no ptosis, conjunctiva and sclera clear. Appears to have lens in place B/L ?cataract surgery   NECK: Supple, No JVD  CHEST/LUNG: CTA bilaterally. No wheezing, rhonchi, rales  HEART: Regular rate and rhythm; No rubs, or gallops  ABDOMEN: Soft, Nontender, Nondistended. Bowel sounds present x4 quads  EXTREMITIES:  2+ Peripheral Pulses, No clubbing, cyanosis, or edema.  NERVOUS SYSTEM:  Alert & Oriented X3, Motor Strength 5/5 B/L upper and lower extremities. FROM active and passive B/L upper and lower extremities. No tremor. Distal sensations intact.    Bedside glucose check: 136.    A/P: 86 y/o F admitted for weakness and slurred speech found to have acute CVA seen for c/o lightheadedness.  - STAT CTH R/O hemorrhagic conversion/acute changes. Called down to CT scan, will take patient when room available.  - Zofran 4mg IVP x1 dose for nausea.  - Continue Q2H neuro checks.   - Instructed RN to hold bedtime dose of hydralazine 100mg PO at this time. Will monitor BP and address accordingly. Repeat /30, P 59.  - Discussed pt with Dr. Connolly, in agreement with plan. Recommend to F/U CT scan results with Neurology. Will f/u with Neurology.  - RN concerned  and Lantus to be administered tonight. Instructed to administer and perform Q4H FS x2 overnight. Monitor for s/s hyper/hypoglycemia.  - RN instructed to continue to monitor pt and notify provider of any changes in pt status.  - PMD will be notified in AM.    from: CT Head No Cont (05.20.21 @ 22:56)     IMPRESSION:   No intracranial bleed, extra-axial fluid collection, mass effect, midline shift or acute territorial infarct evident.    Multifocal chronic infarcts and periventricular white matter small vessel ischemic disease again seen.    Previously demonstrated tiny acute lacunar infarcts of the right occipital lobe and right paramedian frontal lobe are suboptimally demonstrated on this exam.    Called and Discussed pt with Dr. Tolentino. Per  Notified by RN pt c/o feeling "dizzy." Patient is an 86 y/o F PMH DM2, CAD, PAD s/p femoral PCI, mild dementia, HTN, HLD, CKD presented to ED with c/o weakness and slurred speech found to have small Right occipital lobe infarct on MRI 5/20. Per documentation symptoms resolving. Patient seen at bedside, Malay speaking,  Quang funes. Patient alert and oriented to person, place and time. Patient admits to feeling lightheaded when she opens her eyes and nausea. Patient denies headache, changes in vision, sensations of the room spinning, vomiting, abd pain or discomfort at the time of exam.    PHYSICAL EXAM-  Vital Signs   T(F): 98.5 F rectal  HR: 58 sinus rhythm  BP: 151/41   RR: 12 even and unlabored   SpO2: 97% on RA    GENERAL: calm  HEAD: Facial musculature symmetric, facial sensation intact, tongue midline  EYES: right Pupil 7mm dilated and fixed, Left pupil 5mm dilated and fixed, pupils nonreactive to light and accomodation B/L, EOMI, no ptosis, conjunctiva and sclera clear. Appears to have lens in place B/L ?previous cataract surgery   NECK: Supple, No JVD  CHEST/LUNG: CTA bilaterally. No wheezing, rhonchi, rales  HEART: Regular rate and rhythm; No rubs, or gallops  ABDOMEN: Soft, Nontender, Nondistended. Bowel sounds present x4 quads  EXTREMITIES:  2+ Peripheral Pulses, No clubbing, cyanosis, or edema.  NERVOUS SYSTEM:  Alert & Oriented X3, Motor Strength 5/5 B/L upper and lower extremities. FROM active and passive B/L upper and lower extremities. No tremor. Distal sensations intact.    Bedside glucose check: 136.    A/P: 86 y/o F admitted for weakness and slurred speech found to have acute CVA seen for c/o lightheadedness.  - STAT CTH R/O hemorrhagic conversion/acute changes. Called down to CT scan, will take patient when room available.  - Zofran 4mg IVP x1 dose for nausea.  - Continue Q2H neuro checks.   - Instructed RN to hold bedtime dose of hydralazine 100mg PO at this time. Will monitor BP and address accordingly. Repeat /30, P 59.  - Discussed pt with Dr. Connolly, in agreement with plan. Recommend to F/U CT scan results with Neurology. Will f/u with Neurology.  - RN concerned  and Lantus to be administered tonight. Instructed to administer and perform Q4H FS x2 overnight. Monitor for s/s hyper/hypoglycemia.  - RN instructed to continue to monitor pt and notify provider of any changes in pt status.  - PMD will be notified in AM.    22:00 RN reports pt c/o HA 3/10. Pt denies changes in vision.       from: CT Head No Cont (05.20.21 @ 22:56)   IMPRESSION:  No intracranial bleed, extra-axial fluid collection, mass effect, midline shift or acute territorial infarct evident.  Multifocal chronic infarcts and periventricular white matter small vessel ischemic disease again seen.  Previously demonstrated tiny acute lacunar infarcts of the right occipital lobe and right paramedian frontal lobe are suboptimally demonstrated on this exam.    -Called and discussed pt findings and results with Dr. Tolentino. Per Dr. Tolentino pt without change in status as pt seen by provider during the day with ocular findings as above. Will f/u in AM.   - Patient is without signs of acute distress. Pt stated she is hungry and is requesting to eat a sandwich. Pt did not require ordered zofran as pt reports nausea subsided on its own. Pt tolerated meal well.  - Denies HA at this time.   - RN to continue to monitor and escalate PRN.

## 2021-05-20 NOTE — H&P ADULT - NSHPREVIEWOFSYSTEMS_GEN_ALL_CORE
REVIEW OF SYSTEMS:    CONSTITUTIONAL: No fevers or chills + gen weakness  EYES/ENT: No visual changes;  No vertigo or throat pain   NECK: No pain or stiffness  RESPIRATORY: No cough, wheezing, hemoptysis; No shortness of breath  CARDIOVASCULAR: No chest pain or palpitations  GASTROINTESTINAL: No abdominal or epigastric pain. No nausea, vomiting, or hematemesis; No diarrhea or constipation. No melena or hematochezia.  GENITOURINARY: No dysuria, frequency or hematuria  NEUROLOGICAL: No numbness or weakness+ataxia +slurred speech  SKIN: No itching, burning, rashes, or lesions   All other review of systems is negative unless indicated above.

## 2021-05-20 NOTE — CONSULT NOTE ADULT - SUBJECTIVE AND OBJECTIVE BOX
[facility], [facility]                                          [address] , [address], [place] [address]                                               PHONE: [phone]    FAX: [phone] [address], [address], [place] [address]                                       PHONE: [phone]    FAX: [phone]  *******************************************************************************    Reason for Consult:    HPI:  PAULINO LAM is a 85y Female admitted with slurred speech and difficulty with balance, now resolved.   Initial CT brain negative; MRI brain reveals acute right occipital lobe infarct as well as bilateral old frontal lobe infarcts (multiple distributions).   Echo reveals hyperdynamic LV function without evidence for an intracardiac shunt, mild valvular disease. History of HTN, Hl, PAD s/p LE vascular stent therapy, aortic stenosis, CKD.  Has maintained SR overnight. no CP, SOB, palpitations, LOC.     PAST MEDICAL & SURGICAL HISTORY:  HTN (hypertension)    High cholesterol    DM (diabetes mellitus)    Cardiomegaly    PAD (peripheral artery disease)    H/O cataract        No Known Allergies      MEDICATIONS  (STANDING):  aspirin enteric coated 325 milliGRAM(s) Oral daily  atorvastatin 80 milliGRAM(s) Oral at bedtime  carvedilol 12.5 milliGRAM(s) Oral every 12 hours  cloNIDine 0.2 milliGRAM(s) Oral every 8 hours  clopidogrel Tablet 75 milliGRAM(s) Oral daily  cyanocobalamin 1000 MICROGram(s) Oral daily  dextrose 40% Gel 15 Gram(s) Oral once  dextrose 5%. 1000 milliLiter(s) (50 mL/Hr) IV Continuous <Continuous>  dextrose 5%. 1000 milliLiter(s) (100 mL/Hr) IV Continuous <Continuous>  dextrose 50% Injectable 25 Gram(s) IV Push once  dextrose 50% Injectable 12.5 Gram(s) IV Push once  dextrose 50% Injectable 25 Gram(s) IV Push once  ferrous    sulfate 325 milliGRAM(s) Oral daily  furosemide    Tablet 40 milliGRAM(s) Oral two times a day  gabapentin 200 milliGRAM(s) Oral three times a day  glucagon  Injectable 1 milliGRAM(s) IntraMuscular once  hydrALAZINE 100 milliGRAM(s) Oral every 8 hours  insulin glargine Injectable (LANTUS) 15 Unit(s) SubCutaneous at bedtime  insulin lispro (ADMELOG) corrective regimen sliding scale   SubCutaneous three times a day before meals  insulin lispro (ADMELOG) corrective regimen sliding scale   SubCutaneous at bedtime  loratadine 10 milliGRAM(s) Oral daily  memantine 5 milliGRAM(s) Oral daily  pyridoxine 100 milliGRAM(s) Oral daily    MEDICATIONS  (PRN):      Social History: no active tobacco / EtOH / IVDA    Family History: No pertinent family history in first degree relatives        ROS: As noted above, otherwise unremarkable.    Vital Signs Last 24 Hrs  T(C): 36.4 (20 May 2021 15:40), Max: 36.8 (19 May 2021 22:13)  T(F): 97.6 (20 May 2021 15:40), Max: 98.2 (19 May 2021 22:13)  HR: 57 (20 May 2021 15:40) (48 - 59)  BP: 175/71 (20 May 2021 15:40) (136/52 - 185/72)  BP(mean): 84 (20 May 2021 11:34) (74 - 84)  RR: 17 (20 May 2021 15:40) (16 - 18)  SpO2: 99% (20 May 2021 15:40) (97% - 100%)    I&O's Detail    20 May 2021 07:01  -  20 May 2021 16:58  --------------------------------------------------------  IN:  Total IN: 0 mL    OUT:    Voided (mL): 800 mL  Total OUT: 800 mL    Total NET: -800 mL        I&O's Summary    20 May 2021 07:01  -  20 May 2021 16:58  --------------------------------------------------------  IN: 0 mL / OUT: 800 mL / NET: -800 mL            PHYSICAL EXAM:  General: Appears well developed, well nourished, no acute distress  HEENT: Head: normocephalic, atraumatic  Eyes: Pupils equal and reactive  Neck: Supple, bilateral carotid bruits, no JVD, no HJR  CARDIOVASCULAR: Normal S1 and S2, TOSHIA  LUNGS: Clear to auscultation bilaterally, no rales, rhonchi or wheeze  ABDOMEN: Soft, nontender, non-distended, positive bowel sounds, no mass or bruit  EXTREMITIES: No edema, distal pulses WNL  SKIN: Warm and dry with normal turgor  NEURO: Alert & oriented x 3, grossly intact. No focal motor or sensory abn, speech is normal  PSYCH: normal mood and affect    LABS:                        11.8   6.71  )-----------( 218      ( 20 May 2021 08:57 )             36.3     05-20    140  |  107  |  41.0<H>  ----------------------------<  168<H>  3.6   |  24.0  |  2.03<H>    Ca    8.6      20 May 2021 08:57  Phos  3.0     05-20  Mg     2.3     05-20    TPro  6.1<L>  /  Alb  3.2<L>  /  TBili  <0.2<L>  /  DBili  x   /  AST  12  /  ALT  12  /  AlkPhos  70  05-20    CARDIAC MARKERS ( 20 May 2021 00:18 )  x     / <0.01 ng/mL / x     / x     / x              RADIOLOGY & ADDITIONAL STUDIES:  MRI brain, acute right occipital infarct, bilateral old frontal infarcts.  ECG: SR/LVH with repol abn,  ASMI, JACY.No interval change.    ECHO 5/20/2021:   1. Mildly enlarged left atrium.   2. There is moderate concentric left ventricular hypertrophy. 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   3. Hyperdynamic wall motion. Left ventricular ejection fraction, by visual estimation, is 70 to 75%. Grade I diastolic dysfunction.   4. Normal right atrial size.   5. Mild aortic regurgitation.   6. Paradoxical low-flow, low-gradient severe aortic stenosis.   7. There is no evidence of pericardial effusion.    STRESS TEST June 2020: normal perfusion    Prior carotid duplex scans may 2020 bilateral 16-49% narrowings      Assessment and Plan:  In summary, PAULINO LAM is a 85y Female with past medical history significant for HTN, HL, PAD who is admitted with transient slurred speech, now resolved. MRI Brain reveals an acute occiptal infarct and multiple old infarcts in different distributions.  No obvious intracardiac shunt on transthoracic echo. Low flow aortic stenosis is noted, and has been noted previously. The observation of "possible cardiac amyloid" has not been seen on prior outpatient echoes and is unlikely to contribute to her clinical presentation.  The obvious concern if there are multiple infarcts in different distributions is for PAFib, which would require anticoagulation.  Suggest: continue telemetry monitoring. If evidence for AF, then would anticoagulate. If no evidence for arrhythmias, will ask Dr Rivera to place an implantable loop recorder.  She has no symptoms to suggest symptomatic severe aortic stenosis.  Would maintain current CV medications and DAPT for now.  Will follow with you    Thank you    Anurag Mcgee MD  [facility]

## 2021-05-20 NOTE — PHYSICAL THERAPY INITIAL EVALUATION ADULT - PERTINENT HX OF CURRENT PROBLEM, REHAB EVAL
Pt presents to [facility] with reports of slurred speech, generalized weakness and elevated blood pressure

## 2021-05-21 ENCOUNTER — TRANSCRIPTION ENCOUNTER (OUTPATIENT)
Age: 86
End: 2021-05-21

## 2021-05-21 LAB
A1C WITH ESTIMATED AVERAGE GLUCOSE RESULT: 11.3 % — HIGH (ref 4–5.6)
ALBUMIN SERPL ELPH-MCNC: 3.3 G/DL — SIGNIFICANT CHANGE UP (ref 3.3–5.2)
ALP SERPL-CCNC: 74 U/L — SIGNIFICANT CHANGE UP (ref 40–120)
ALT FLD-CCNC: 13 U/L — SIGNIFICANT CHANGE UP
ANION GAP SERPL CALC-SCNC: 12 MMOL/L — SIGNIFICANT CHANGE UP (ref 5–17)
AST SERPL-CCNC: 12 U/L — SIGNIFICANT CHANGE UP
BILIRUB SERPL-MCNC: 0.3 MG/DL — LOW (ref 0.4–2)
BUN SERPL-MCNC: 51 MG/DL — HIGH (ref 8–20)
CALCIUM SERPL-MCNC: 8.7 MG/DL — SIGNIFICANT CHANGE UP (ref 8.6–10.2)
CHLORIDE SERPL-SCNC: 100 MMOL/L — SIGNIFICANT CHANGE UP (ref 98–107)
CO2 SERPL-SCNC: 26 MMOL/L — SIGNIFICANT CHANGE UP (ref 22–29)
COVID-19 SPIKE DOMAIN AB INTERP: POSITIVE
COVID-19 SPIKE DOMAIN ANTIBODY RESULT: 106 U/ML — HIGH
CREAT SERPL-MCNC: 2.21 MG/DL — HIGH (ref 0.5–1.3)
ESTIMATED AVERAGE GLUCOSE: 278 MG/DL — HIGH (ref 68–114)
GLUCOSE BLDC GLUCOMTR-MCNC: 197 MG/DL — HIGH (ref 70–99)
GLUCOSE BLDC GLUCOMTR-MCNC: 209 MG/DL — HIGH (ref 70–99)
GLUCOSE BLDC GLUCOMTR-MCNC: 233 MG/DL — HIGH (ref 70–99)
GLUCOSE BLDC GLUCOMTR-MCNC: 275 MG/DL — HIGH (ref 70–99)
GLUCOSE BLDC GLUCOMTR-MCNC: 304 MG/DL — HIGH (ref 70–99)
GLUCOSE SERPL-MCNC: 223 MG/DL — HIGH (ref 70–99)
HCT VFR BLD CALC: 38.3 % — SIGNIFICANT CHANGE UP (ref 34.5–45)
HGB BLD-MCNC: 12.5 G/DL — SIGNIFICANT CHANGE UP (ref 11.5–15.5)
MAGNESIUM SERPL-MCNC: 2.2 MG/DL — SIGNIFICANT CHANGE UP (ref 1.6–2.6)
MCHC RBC-ENTMCNC: 28.7 PG — SIGNIFICANT CHANGE UP (ref 27–34)
MCHC RBC-ENTMCNC: 32.6 GM/DL — SIGNIFICANT CHANGE UP (ref 32–36)
MCV RBC AUTO: 87.8 FL — SIGNIFICANT CHANGE UP (ref 80–100)
PLATELET # BLD AUTO: 254 K/UL — SIGNIFICANT CHANGE UP (ref 150–400)
POTASSIUM SERPL-MCNC: 3.3 MMOL/L — LOW (ref 3.5–5.3)
POTASSIUM SERPL-SCNC: 3.3 MMOL/L — LOW (ref 3.5–5.3)
PROT SERPL-MCNC: 6.3 G/DL — LOW (ref 6.6–8.7)
RBC # BLD: 4.36 M/UL — SIGNIFICANT CHANGE UP (ref 3.8–5.2)
RBC # FLD: 13.2 % — SIGNIFICANT CHANGE UP (ref 10.3–14.5)
SARS-COV-2 IGG+IGM SERPL QL IA: 106 U/ML — HIGH
SARS-COV-2 IGG+IGM SERPL QL IA: POSITIVE
SODIUM SERPL-SCNC: 138 MMOL/L — SIGNIFICANT CHANGE UP (ref 135–145)
WBC # BLD: 9.15 K/UL — SIGNIFICANT CHANGE UP (ref 3.8–10.5)
WBC # FLD AUTO: 9.15 K/UL — SIGNIFICANT CHANGE UP (ref 3.8–10.5)

## 2021-05-21 PROCEDURE — 71045 X-RAY EXAM CHEST 1 VIEW: CPT | Mod: 26

## 2021-05-21 PROCEDURE — 99233 SBSQ HOSP IP/OBS HIGH 50: CPT

## 2021-05-21 PROCEDURE — 93010 ELECTROCARDIOGRAM REPORT: CPT

## 2021-05-21 RX ORDER — GABAPENTIN 400 MG/1
300 CAPSULE ORAL THREE TIMES A DAY
Refills: 0 | Status: DISCONTINUED | OUTPATIENT
Start: 2021-05-21 | End: 2021-05-21

## 2021-05-21 RX ORDER — ASPIRIN/CALCIUM CARB/MAGNESIUM 324 MG
81 TABLET ORAL DAILY
Refills: 0 | Status: DISCONTINUED | OUTPATIENT
Start: 2021-05-21 | End: 2021-05-25

## 2021-05-21 RX ORDER — POTASSIUM CHLORIDE 20 MEQ
40 PACKET (EA) ORAL ONCE
Refills: 0 | Status: COMPLETED | OUTPATIENT
Start: 2021-05-21 | End: 2021-05-21

## 2021-05-21 RX ORDER — GABAPENTIN 400 MG/1
200 CAPSULE ORAL THREE TIMES A DAY
Refills: 0 | Status: DISCONTINUED | OUTPATIENT
Start: 2021-05-21 | End: 2021-05-25

## 2021-05-21 RX ORDER — MORPHINE SULFATE 50 MG/1
0.5 CAPSULE, EXTENDED RELEASE ORAL ONCE
Refills: 0 | Status: DISCONTINUED | OUTPATIENT
Start: 2021-05-21 | End: 2021-05-21

## 2021-05-21 RX ORDER — HEPARIN SODIUM 5000 [USP'U]/ML
5000 INJECTION INTRAVENOUS; SUBCUTANEOUS EVERY 12 HOURS
Refills: 0 | Status: DISCONTINUED | OUTPATIENT
Start: 2021-05-21 | End: 2021-05-25

## 2021-05-21 RX ADMIN — Medication 325 MILLIGRAM(S): at 12:21

## 2021-05-21 RX ADMIN — Medication 40 MILLIGRAM(S): at 06:08

## 2021-05-21 RX ADMIN — INSULIN GLARGINE 15 UNIT(S): 100 INJECTION, SOLUTION SUBCUTANEOUS at 21:30

## 2021-05-21 RX ADMIN — Medication 81 MILLIGRAM(S): at 12:21

## 2021-05-21 RX ADMIN — Medication 100 MILLIGRAM(S): at 12:21

## 2021-05-21 RX ADMIN — Medication 100 MILLIGRAM(S): at 14:28

## 2021-05-21 RX ADMIN — CARVEDILOL PHOSPHATE 12.5 MILLIGRAM(S): 80 CAPSULE, EXTENDED RELEASE ORAL at 17:21

## 2021-05-21 RX ADMIN — CARVEDILOL PHOSPHATE 12.5 MILLIGRAM(S): 80 CAPSULE, EXTENDED RELEASE ORAL at 06:08

## 2021-05-21 RX ADMIN — Medication 40 MILLIGRAM(S): at 17:21

## 2021-05-21 RX ADMIN — Medication 2: at 17:21

## 2021-05-21 RX ADMIN — GABAPENTIN 200 MILLIGRAM(S): 400 CAPSULE ORAL at 06:08

## 2021-05-21 RX ADMIN — HEPARIN SODIUM 5000 UNIT(S): 5000 INJECTION INTRAVENOUS; SUBCUTANEOUS at 17:21

## 2021-05-21 RX ADMIN — CLOPIDOGREL BISULFATE 75 MILLIGRAM(S): 75 TABLET, FILM COATED ORAL at 12:21

## 2021-05-21 RX ADMIN — Medication 100 MILLIGRAM(S): at 06:08

## 2021-05-21 RX ADMIN — ONDANSETRON 4 MILLIGRAM(S): 8 TABLET, FILM COATED ORAL at 06:14

## 2021-05-21 RX ADMIN — Medication 40 MILLIEQUIVALENT(S): at 07:41

## 2021-05-21 RX ADMIN — MEMANTINE HYDROCHLORIDE 5 MILLIGRAM(S): 10 TABLET ORAL at 12:21

## 2021-05-21 RX ADMIN — GABAPENTIN 200 MILLIGRAM(S): 400 CAPSULE ORAL at 14:28

## 2021-05-21 RX ADMIN — Medication 2: at 21:30

## 2021-05-21 RX ADMIN — MORPHINE SULFATE 0.5 MILLIGRAM(S): 50 CAPSULE, EXTENDED RELEASE ORAL at 10:48

## 2021-05-21 RX ADMIN — Medication 0.2 MILLIGRAM(S): at 21:30

## 2021-05-21 RX ADMIN — GABAPENTIN 200 MILLIGRAM(S): 400 CAPSULE ORAL at 21:30

## 2021-05-21 RX ADMIN — Medication 0.2 MILLIGRAM(S): at 06:08

## 2021-05-21 RX ADMIN — Medication 4: at 07:41

## 2021-05-21 RX ADMIN — MORPHINE SULFATE 0.5 MILLIGRAM(S): 50 CAPSULE, EXTENDED RELEASE ORAL at 10:33

## 2021-05-21 RX ADMIN — LORATADINE 10 MILLIGRAM(S): 10 TABLET ORAL at 12:21

## 2021-05-21 RX ADMIN — Medication 100 MILLIGRAM(S): at 00:34

## 2021-05-21 RX ADMIN — Medication 8: at 12:20

## 2021-05-21 RX ADMIN — PREGABALIN 1000 MICROGRAM(S): 225 CAPSULE ORAL at 12:21

## 2021-05-21 RX ADMIN — ATORVASTATIN CALCIUM 80 MILLIGRAM(S): 80 TABLET, FILM COATED ORAL at 21:30

## 2021-05-21 NOTE — DISCHARGE NOTE NURSING/CASE MANAGEMENT/SOCIAL WORK - PATIENT PORTAL LINK FT
You can access the FollowMyHealth Patient Portal offered by [facility] by registering at the following website: [email or web address]. By joining [employer]’s FollowMyHealth portal, you will also be able to view your health information using other applications (apps) compatible with our system.

## 2021-05-21 NOTE — DISCHARGE NOTE NURSING/CASE MANAGEMENT/SOCIAL WORK - NSDCPEFALRISK_GEN_ALL_CORE
Patient information on fall and injury prevention [facility]), Surgery  [address] [place] [address]  Phone: [phone]  Fax: [phone]

## 2021-05-21 NOTE — PROGRESS NOTE ADULT - ASSESSMENT
The patient is a 85y Female who is followed by neurology because of transient dysarthria    Stroke  Stroke on MRI, small  possible TIA vs effect of hyperglycemia  doing better now,     - carotid duplex as above  - echocardiogram as above  - PT/OT  - speech and swallow therapy  - ASA 81, plavix  - LDL=69, continue  lipitor  - DVT prophylaxis      Small strokes on MRI, work up not revealing  Need to address risk factors  HTN- keep normtensive  HLD goal LDL<70, continue lipitor  DM- keep euglycemic    Thank you for allowing me to participate in the care of your patient    Jer Tolentino MD, PhD   [id number]

## 2021-05-21 NOTE — PROGRESS NOTE ADULT - ASSESSMENT
85F hx DM2, CAD, PAD s/p femoral PCI, mild dementia, HTN, HLD, CKD presents with worsening weakness and slurred speech, hyperglycemia.    #Slurred speech and Ataxia due to CVA  dizziness overnight  - initial cth negative  -initial code stroke, but out of window and symptoms improving  -nihss 0  - MRI  Acute small right occipital lobe cortical infarct. Acute punctate right paramedian frontal lobe infarct.  - repeat cth no hem  - Carotid duplex no significant stenosis  - asa 81 daily and atorvastatin 80 mg   -neuro consult appreciated.   -q2 neuro checks for now will discuss with neuro if we can move to q 4  - Patient follows with [facility],  Cards plans for plan for 14 day zio monitor in the outpt setting to assess for PAF due to infarcts in multiple territories suggesting embolic event with plan for consideration of ILR.   - PT ordered    #DM2 with hyperglycemia  # Diabetic neuropathy  a1c 113%  -lantus 15 u qhs  -moderate iss w/ meals  -hold oral hyperglycemic  -adjust as needed based on FS  - diabetic educator consulted  - instructed rn to teach family how to give insulin  - start Gabapentin 100mg BID    #Elevated lipase  -c/o mild nausea  -no abd pain or vomiting  -ct negative for pancreatitis  -perhaps elevated due to poorly controlled glucose    #HTN  -c/w clonidine, hydralazine, lasix and clonidine    #CAD, PAD  -c/w aspirin and Plavix  -c/w carvedilol    #CKD  -creatinine at baseline  -renal dose meds  -continue to monitor    Hypokalemia  - replaced     # Severe Aortic stenosis.  possible amyloid noted on echo in a diabetic of advanced age with CKD suggested by echo. Conservative management for now per cardiology   Per cards Will need evaluation and consideration of possible TAVR following resolution of acute stroke symptoms and recovery from CVA.  This will be done in the outpt setting as timing not ideal in light of acute stroke.    #dvt ppx  -heparin sq  Dispo: Pending PT eval, Neuro follow up and family ability to given patient insulin   85F hx DM2, CAD, PAD s/p femoral PCI, mild dementia, HTN, HLD, CKD presents with worsening weakness and slurred speech, hyperglycemia.    #Slurred speech and Ataxia due to CVA  dizziness overnight  - initial cth negative  -initial code stroke, but out of window and symptoms improving  -nihss 0  - MRI  Acute small right occipital lobe cortical infarct. Acute punctate right paramedian frontal lobe infarct.  - repeat cth no hem  - Carotid duplex no significant stenosis  - asa 81 daily and atorvastatin 80 mg   -neuro consult appreciated.   -q2 neuro checks for now will discuss with neuro if we can move to q 4  - Patient follows with [facility],  Cards plans for plan for 14 day zio monitor in the outpt setting to assess for PAF due to infarcts in multiple territories suggesting embolic event with plan for consideration of ILR.   - PT ordered    #DM2 with hyperglycemia  # Diabetic neuropathy  a1c 113%  -lantus 15 u qhs  -moderate iss w/ meals  -hold oral hyperglycemic  -adjust as needed based on FS  - diabetic educator consulted  - instructed rn to teach family how to give insulin  - Increase Gabapentin to 300mg TID    #Elevated lipase  -c/o mild nausea  -no abd pain or vomiting  -ct negative for pancreatitis  -perhaps elevated due to poorly controlled glucose    #HTN  -c/w clonidine, hydralazine, lasix and clonidine    #CAD, PAD  -c/w aspirin and Plavix  -c/w carvedilol    #CKD  -creatinine at baseline  -renal dose meds  -continue to monitor    Hypokalemia  - replaced     # Severe Aortic stenosis.  possible amyloid noted on echo in a diabetic of advanced age with CKD suggested by echo. Conservative management for now per cardiology   Per cards Will need evaluation and consideration of possible TAVR following resolution of acute stroke symptoms and recovery from CVA.  This will be done in the outpt setting as timing not ideal in light of acute stroke.    #dvt ppx  -heparin sq  Dispo: Pending PT eval, Neuro follow up and family ability to given patient insulin

## 2021-05-21 NOTE — OCCUPATIONAL THERAPY INITIAL EVALUATION ADULT - LEVEL OF INDEPENDENCE: DRESS LOWER BODY, OT EVAL
to don socks seated at the edge of the bed/moderate assist (50% patients effort)/maximum assist (25% patients effort)

## 2021-05-21 NOTE — OCCUPATIONAL THERAPY INITIAL EVALUATION ADULT - ADDITIONAL COMMENTS
Pt has tub with troy  Pt owns a cane  Daughter reports that pt was approved for a HHA and that she started the process prior to hospitalization  Pt is right handed

## 2021-05-22 LAB
ALBUMIN SERPL ELPH-MCNC: 3.3 G/DL — SIGNIFICANT CHANGE UP (ref 3.3–5.2)
ALP SERPL-CCNC: 64 U/L — SIGNIFICANT CHANGE UP (ref 40–120)
ALT FLD-CCNC: 11 U/L — SIGNIFICANT CHANGE UP
ANION GAP SERPL CALC-SCNC: 13 MMOL/L — SIGNIFICANT CHANGE UP (ref 5–17)
AST SERPL-CCNC: 11 U/L — SIGNIFICANT CHANGE UP
BILIRUB SERPL-MCNC: 0.2 MG/DL — LOW (ref 0.4–2)
BUN SERPL-MCNC: 55 MG/DL — HIGH (ref 8–20)
CALCIUM SERPL-MCNC: 8.5 MG/DL — LOW (ref 8.6–10.2)
CHLORIDE SERPL-SCNC: 101 MMOL/L — SIGNIFICANT CHANGE UP (ref 98–107)
CO2 SERPL-SCNC: 25 MMOL/L — SIGNIFICANT CHANGE UP (ref 22–29)
CREAT SERPL-MCNC: 2.74 MG/DL — HIGH (ref 0.5–1.3)
GLUCOSE BLDC GLUCOMTR-MCNC: 120 MG/DL — HIGH (ref 70–99)
GLUCOSE BLDC GLUCOMTR-MCNC: 191 MG/DL — HIGH (ref 70–99)
GLUCOSE BLDC GLUCOMTR-MCNC: 274 MG/DL — HIGH (ref 70–99)
GLUCOSE BLDC GLUCOMTR-MCNC: 315 MG/DL — HIGH (ref 70–99)
GLUCOSE SERPL-MCNC: 104 MG/DL — HIGH (ref 70–99)
HCT VFR BLD CALC: 38.1 % — SIGNIFICANT CHANGE UP (ref 34.5–45)
HGB BLD-MCNC: 11.9 G/DL — SIGNIFICANT CHANGE UP (ref 11.5–15.5)
MAGNESIUM SERPL-MCNC: 2.4 MG/DL — SIGNIFICANT CHANGE UP (ref 1.6–2.6)
MCHC RBC-ENTMCNC: 28.4 PG — SIGNIFICANT CHANGE UP (ref 27–34)
MCHC RBC-ENTMCNC: 31.2 GM/DL — LOW (ref 32–36)
MCV RBC AUTO: 90.9 FL — SIGNIFICANT CHANGE UP (ref 80–100)
PLATELET # BLD AUTO: 218 K/UL — SIGNIFICANT CHANGE UP (ref 150–400)
POTASSIUM SERPL-MCNC: 3.2 MMOL/L — LOW (ref 3.5–5.3)
POTASSIUM SERPL-SCNC: 3.2 MMOL/L — LOW (ref 3.5–5.3)
PROT SERPL-MCNC: 6.1 G/DL — LOW (ref 6.6–8.7)
RBC # BLD: 4.19 M/UL — SIGNIFICANT CHANGE UP (ref 3.8–5.2)
RBC # FLD: 13 % — SIGNIFICANT CHANGE UP (ref 10.3–14.5)
SODIUM SERPL-SCNC: 139 MMOL/L — SIGNIFICANT CHANGE UP (ref 135–145)
WBC # BLD: 6.86 K/UL — SIGNIFICANT CHANGE UP (ref 3.8–10.5)
WBC # FLD AUTO: 6.86 K/UL — SIGNIFICANT CHANGE UP (ref 3.8–10.5)

## 2021-05-22 PROCEDURE — 99233 SBSQ HOSP IP/OBS HIGH 50: CPT

## 2021-05-22 RX ORDER — POTASSIUM CHLORIDE 20 MEQ
40 PACKET (EA) ORAL ONCE
Refills: 0 | Status: COMPLETED | OUTPATIENT
Start: 2021-05-22 | End: 2021-05-22

## 2021-05-22 RX ORDER — ACETAMINOPHEN 500 MG
650 TABLET ORAL EVERY 6 HOURS
Refills: 0 | Status: COMPLETED | OUTPATIENT
Start: 2021-05-22 | End: 2021-05-22

## 2021-05-22 RX ORDER — CARVEDILOL PHOSPHATE 80 MG/1
6.25 CAPSULE, EXTENDED RELEASE ORAL EVERY 12 HOURS
Refills: 0 | Status: DISCONTINUED | OUTPATIENT
Start: 2021-05-22 | End: 2021-05-25

## 2021-05-22 RX ADMIN — Medication 100 MILLIGRAM(S): at 12:16

## 2021-05-22 RX ADMIN — GABAPENTIN 200 MILLIGRAM(S): 400 CAPSULE ORAL at 14:13

## 2021-05-22 RX ADMIN — HEPARIN SODIUM 5000 UNIT(S): 5000 INJECTION INTRAVENOUS; SUBCUTANEOUS at 06:00

## 2021-05-22 RX ADMIN — CARVEDILOL PHOSPHATE 12.5 MILLIGRAM(S): 80 CAPSULE, EXTENDED RELEASE ORAL at 06:00

## 2021-05-22 RX ADMIN — Medication 0.2 MILLIGRAM(S): at 06:00

## 2021-05-22 RX ADMIN — MEMANTINE HYDROCHLORIDE 5 MILLIGRAM(S): 10 TABLET ORAL at 17:11

## 2021-05-22 RX ADMIN — ATORVASTATIN CALCIUM 80 MILLIGRAM(S): 80 TABLET, FILM COATED ORAL at 21:29

## 2021-05-22 RX ADMIN — INSULIN GLARGINE 15 UNIT(S): 100 INJECTION, SOLUTION SUBCUTANEOUS at 21:29

## 2021-05-22 RX ADMIN — Medication 40 MILLIGRAM(S): at 06:00

## 2021-05-22 RX ADMIN — Medication 325 MILLIGRAM(S): at 12:17

## 2021-05-22 RX ADMIN — Medication 81 MILLIGRAM(S): at 12:17

## 2021-05-22 RX ADMIN — Medication 40 MILLIEQUIVALENT(S): at 12:17

## 2021-05-22 RX ADMIN — CLOPIDOGREL BISULFATE 75 MILLIGRAM(S): 75 TABLET, FILM COATED ORAL at 12:17

## 2021-05-22 RX ADMIN — GABAPENTIN 200 MILLIGRAM(S): 400 CAPSULE ORAL at 21:29

## 2021-05-22 RX ADMIN — GABAPENTIN 200 MILLIGRAM(S): 400 CAPSULE ORAL at 06:00

## 2021-05-22 RX ADMIN — Medication 0.2 MILLIGRAM(S): at 21:29

## 2021-05-22 RX ADMIN — Medication 650 MILLIGRAM(S): at 20:10

## 2021-05-22 RX ADMIN — Medication 8: at 12:18

## 2021-05-22 RX ADMIN — LORATADINE 10 MILLIGRAM(S): 10 TABLET ORAL at 12:16

## 2021-05-22 RX ADMIN — Medication 40 MILLIGRAM(S): at 17:13

## 2021-05-22 RX ADMIN — Medication 6: at 17:11

## 2021-05-22 RX ADMIN — Medication 650 MILLIGRAM(S): at 21:10

## 2021-05-22 RX ADMIN — HEPARIN SODIUM 5000 UNIT(S): 5000 INJECTION INTRAVENOUS; SUBCUTANEOUS at 17:11

## 2021-05-22 RX ADMIN — PREGABALIN 1000 MICROGRAM(S): 225 CAPSULE ORAL at 12:17

## 2021-05-22 NOTE — PROGRESS NOTE ADULT - ASSESSMENT
85F hx DM2, CAD, PAD s/p femoral PCI, mild dementia, HTN, HLD, CKD presents with worsening weakness and slurred speech, hyperglycemia.    CVA   MRI  Acute small right occipital lobe cortical infarct. Acute punctate right paramedian frontal lobe infarct.  Slurred speech and Ataxia due to CVA  - Carotid duplex no significant stenosis  - asa 81 daily and atorvastatin 80 mg   -neuro consult appreciated.   q 4 neuro checks     DM2 with hyperglycemia  with Diabetic neuropathy  a1c 11.3%  -lantus 15 u qhs  -moderate iss w/ meals  - diabetic educator consulted  - Increase Gabapentin to 300mg TID    Elevated lipase  -ct negative for pancreatitis  -May be related to poorly controlled blood sugar     HTN  -c/w clonidine, hydralazine, lasix and clonidine    CAD, PAD  -c/w aspirin and Plavix  -c/w carvedilol    CKD  -creatinine at baseline  -renal dose meds  -continue to monitor    Severe Aortic stenosis.  possible amyloid noted on echo in a diabetic of advanced age with CKD suggested by echo. Conservative management for now per cardiology   Per cards Will need evaluation and consideration of possible TAVR following resolution of acute stroke symptoms and recovery from CVA.  This will be done in the outpt setting as timing not ideal in light of acute stroke.

## 2021-05-23 LAB
ANION GAP SERPL CALC-SCNC: 13 MMOL/L — SIGNIFICANT CHANGE UP (ref 5–17)
BUN SERPL-MCNC: 57 MG/DL — HIGH (ref 8–20)
CALCIUM SERPL-MCNC: 8.6 MG/DL — SIGNIFICANT CHANGE UP (ref 8.6–10.2)
CHLORIDE SERPL-SCNC: 99 MMOL/L — SIGNIFICANT CHANGE UP (ref 98–107)
CO2 SERPL-SCNC: 27 MMOL/L — SIGNIFICANT CHANGE UP (ref 22–29)
CREAT SERPL-MCNC: 2.57 MG/DL — HIGH (ref 0.5–1.3)
GLUCOSE BLDC GLUCOMTR-MCNC: 193 MG/DL — HIGH (ref 70–99)
GLUCOSE BLDC GLUCOMTR-MCNC: 225 MG/DL — HIGH (ref 70–99)
GLUCOSE BLDC GLUCOMTR-MCNC: 245 MG/DL — HIGH (ref 70–99)
GLUCOSE BLDC GLUCOMTR-MCNC: 250 MG/DL — HIGH (ref 70–99)
GLUCOSE SERPL-MCNC: 154 MG/DL — HIGH (ref 70–99)
HCT VFR BLD CALC: 39.2 % — SIGNIFICANT CHANGE UP (ref 34.5–45)
HGB BLD-MCNC: 12.4 G/DL — SIGNIFICANT CHANGE UP (ref 11.5–15.5)
MCHC RBC-ENTMCNC: 28.6 PG — SIGNIFICANT CHANGE UP (ref 27–34)
MCHC RBC-ENTMCNC: 31.6 GM/DL — LOW (ref 32–36)
MCV RBC AUTO: 90.3 FL — SIGNIFICANT CHANGE UP (ref 80–100)
PLATELET # BLD AUTO: 231 K/UL — SIGNIFICANT CHANGE UP (ref 150–400)
POTASSIUM SERPL-MCNC: 3.1 MMOL/L — LOW (ref 3.5–5.3)
POTASSIUM SERPL-SCNC: 3.1 MMOL/L — LOW (ref 3.5–5.3)
RBC # BLD: 4.34 M/UL — SIGNIFICANT CHANGE UP (ref 3.8–5.2)
RBC # FLD: 12.8 % — SIGNIFICANT CHANGE UP (ref 10.3–14.5)
SODIUM SERPL-SCNC: 138 MMOL/L — SIGNIFICANT CHANGE UP (ref 135–145)
WBC # BLD: 6.88 K/UL — SIGNIFICANT CHANGE UP (ref 3.8–10.5)
WBC # FLD AUTO: 6.88 K/UL — SIGNIFICANT CHANGE UP (ref 3.8–10.5)

## 2021-05-23 PROCEDURE — 99233 SBSQ HOSP IP/OBS HIGH 50: CPT

## 2021-05-23 RX ORDER — HYDRALAZINE HCL 50 MG
10 TABLET ORAL
Refills: 0 | Status: COMPLETED | OUTPATIENT
Start: 2021-05-23 | End: 2021-05-24

## 2021-05-23 RX ORDER — HYDRALAZINE HCL 50 MG
25 TABLET ORAL
Refills: 0 | Status: DISCONTINUED | OUTPATIENT
Start: 2021-05-23 | End: 2021-05-23

## 2021-05-23 RX ORDER — MORPHINE SULFATE 50 MG/1
2 CAPSULE, EXTENDED RELEASE ORAL EVERY 6 HOURS
Refills: 0 | Status: DISCONTINUED | OUTPATIENT
Start: 2021-05-23 | End: 2021-05-25

## 2021-05-23 RX ORDER — ACETAMINOPHEN 500 MG
650 TABLET ORAL EVERY 6 HOURS
Refills: 0 | Status: DISCONTINUED | OUTPATIENT
Start: 2021-05-23 | End: 2021-05-25

## 2021-05-23 RX ORDER — HYDRALAZINE HCL 50 MG
50 TABLET ORAL THREE TIMES A DAY
Refills: 0 | Status: DISCONTINUED | OUTPATIENT
Start: 2021-05-23 | End: 2021-05-25

## 2021-05-23 RX ADMIN — HEPARIN SODIUM 5000 UNIT(S): 5000 INJECTION INTRAVENOUS; SUBCUTANEOUS at 17:14

## 2021-05-23 RX ADMIN — GABAPENTIN 200 MILLIGRAM(S): 400 CAPSULE ORAL at 12:13

## 2021-05-23 RX ADMIN — Medication 40 MILLIGRAM(S): at 05:22

## 2021-05-23 RX ADMIN — Medication 5 MILLIGRAM(S): at 15:02

## 2021-05-23 RX ADMIN — LORATADINE 10 MILLIGRAM(S): 10 TABLET ORAL at 08:31

## 2021-05-23 RX ADMIN — HEPARIN SODIUM 5000 UNIT(S): 5000 INJECTION INTRAVENOUS; SUBCUTANEOUS at 05:22

## 2021-05-23 RX ADMIN — CARVEDILOL PHOSPHATE 6.25 MILLIGRAM(S): 80 CAPSULE, EXTENDED RELEASE ORAL at 17:15

## 2021-05-23 RX ADMIN — Medication 10 MILLIGRAM(S): at 15:41

## 2021-05-23 RX ADMIN — Medication 81 MILLIGRAM(S): at 08:31

## 2021-05-23 RX ADMIN — Medication 10 MILLIGRAM(S): at 14:37

## 2021-05-23 RX ADMIN — CLOPIDOGREL BISULFATE 75 MILLIGRAM(S): 75 TABLET, FILM COATED ORAL at 08:31

## 2021-05-23 RX ADMIN — Medication 50 MILLIGRAM(S): at 11:20

## 2021-05-23 RX ADMIN — GABAPENTIN 200 MILLIGRAM(S): 400 CAPSULE ORAL at 05:22

## 2021-05-23 RX ADMIN — Medication 325 MILLIGRAM(S): at 08:32

## 2021-05-23 RX ADMIN — Medication 50 MILLIGRAM(S): at 22:03

## 2021-05-23 RX ADMIN — Medication 100 MILLIGRAM(S): at 08:31

## 2021-05-23 RX ADMIN — Medication 650 MILLIGRAM(S): at 10:32

## 2021-05-23 RX ADMIN — Medication 4: at 08:30

## 2021-05-23 RX ADMIN — Medication 4: at 12:13

## 2021-05-23 RX ADMIN — CARVEDILOL PHOSPHATE 6.25 MILLIGRAM(S): 80 CAPSULE, EXTENDED RELEASE ORAL at 11:20

## 2021-05-23 RX ADMIN — Medication 0.2 MILLIGRAM(S): at 13:13

## 2021-05-23 RX ADMIN — Medication 650 MILLIGRAM(S): at 20:04

## 2021-05-23 RX ADMIN — Medication 40 MILLIGRAM(S): at 17:19

## 2021-05-23 RX ADMIN — GABAPENTIN 200 MILLIGRAM(S): 400 CAPSULE ORAL at 22:03

## 2021-05-23 RX ADMIN — ATORVASTATIN CALCIUM 80 MILLIGRAM(S): 80 TABLET, FILM COATED ORAL at 22:03

## 2021-05-23 RX ADMIN — Medication 650 MILLIGRAM(S): at 11:32

## 2021-05-23 RX ADMIN — Medication 650 MILLIGRAM(S): at 20:34

## 2021-05-23 RX ADMIN — Medication 4: at 17:14

## 2021-05-23 RX ADMIN — Medication 0.2 MILLIGRAM(S): at 22:03

## 2021-05-23 RX ADMIN — MORPHINE SULFATE 2 MILLIGRAM(S): 50 CAPSULE, EXTENDED RELEASE ORAL at 14:39

## 2021-05-23 RX ADMIN — INSULIN GLARGINE 15 UNIT(S): 100 INJECTION, SOLUTION SUBCUTANEOUS at 22:02

## 2021-05-23 RX ADMIN — MEMANTINE HYDROCHLORIDE 5 MILLIGRAM(S): 10 TABLET ORAL at 08:31

## 2021-05-23 RX ADMIN — PREGABALIN 1000 MICROGRAM(S): 225 CAPSULE ORAL at 08:31

## 2021-05-23 NOTE — PROGRESS NOTE ADULT - ASSESSMENT
85F hx DM2, CAD, PAD s/p femoral PCI, mild dementia, HTN, HLD, CKD presents with worsening weakness and slurred speech, hyperglycemia.    CVA   MRI  Acute small right occipital lobe cortical infarct. Acute punctate right paramedian frontal lobe infarct.  Slurred speech and Ataxia due to CVA  - Carotid duplex no significant stenosis  - asa 81 daily and atorvastatin 80 mg   q 4 neuro checks     DM2 with hyperglycemia  with Diabetic neuropathy  a1c 11.3%  -lantus 15 u qhs  -moderate iss w/ meals  - diabetic education  - Increase Gabapentin to 300mg TID    Elevated lipase  -ct negative for pancreatitis  -May be related to poorly controlled blood sugar     HTN  -c/w clonidine, hydralazine, lasix and clonidine    CAD, PAD  -c/w aspirin and Plavix  -c/w carvedilol    CKD  -creatinine at baseline  -renal dose meds  -continue to monitor    Severe Aortic stenosis.  possible amyloid noted on echo in a diabetic of advanced age with CKD suggested by echo. Conservative management for now per cardiology   Per cards Will need evaluation and consideration of possible TAVR following resolution of acute stroke symptoms and recovery from CVA.  This will be done in the outpt setting as timing not ideal in light of acute stroke.    Disposition:  DC to home vs rehab.  Discussed with granddaugther.  said they will like to have her home with home health aid . SW consult

## 2021-05-24 ENCOUNTER — TRANSCRIPTION ENCOUNTER (OUTPATIENT)
Age: 86
End: 2021-05-24

## 2021-05-24 LAB
ANION GAP SERPL CALC-SCNC: 13 MMOL/L — SIGNIFICANT CHANGE UP (ref 5–17)
BUN SERPL-MCNC: 60 MG/DL — HIGH (ref 8–20)
CALCIUM SERPL-MCNC: 8.3 MG/DL — LOW (ref 8.6–10.2)
CHLORIDE SERPL-SCNC: 94 MMOL/L — LOW (ref 98–107)
CO2 SERPL-SCNC: 27 MMOL/L — SIGNIFICANT CHANGE UP (ref 22–29)
CREAT SERPL-MCNC: 2.88 MG/DL — HIGH (ref 0.5–1.3)
GLUCOSE BLDC GLUCOMTR-MCNC: 155 MG/DL — HIGH (ref 70–99)
GLUCOSE BLDC GLUCOMTR-MCNC: 184 MG/DL — HIGH (ref 70–99)
GLUCOSE BLDC GLUCOMTR-MCNC: 251 MG/DL — HIGH (ref 70–99)
GLUCOSE BLDC GLUCOMTR-MCNC: 431 MG/DL — HIGH (ref 70–99)
GLUCOSE SERPL-MCNC: 434 MG/DL — HIGH (ref 70–99)
POTASSIUM SERPL-MCNC: 3.6 MMOL/L — SIGNIFICANT CHANGE UP (ref 3.5–5.3)
POTASSIUM SERPL-SCNC: 3.6 MMOL/L — SIGNIFICANT CHANGE UP (ref 3.5–5.3)
SARS-COV-2 RNA SPEC QL NAA+PROBE: SIGNIFICANT CHANGE UP
SODIUM SERPL-SCNC: 134 MMOL/L — LOW (ref 135–145)

## 2021-05-24 PROCEDURE — 99233 SBSQ HOSP IP/OBS HIGH 50: CPT

## 2021-05-24 RX ORDER — ASPIRIN/CALCIUM CARB/MAGNESIUM 324 MG
1 TABLET ORAL
Qty: 0 | Refills: 0 | DISCHARGE
Start: 2021-05-24

## 2021-05-24 RX ORDER — ATORVASTATIN CALCIUM 80 MG/1
1 TABLET, FILM COATED ORAL
Qty: 0 | Refills: 0 | DISCHARGE
Start: 2021-05-24

## 2021-05-24 RX ORDER — LISINOPRIL 2.5 MG/1
1 TABLET ORAL
Qty: 30 | Refills: 0
Start: 2021-05-24 | End: 2021-06-22

## 2021-05-24 RX ORDER — ASPIRIN/CALCIUM CARB/MAGNESIUM 324 MG
1 TABLET ORAL
Qty: 0 | Refills: 0 | DISCHARGE

## 2021-05-24 RX ORDER — GABAPENTIN 400 MG/1
2 CAPSULE ORAL
Qty: 0 | Refills: 0 | DISCHARGE
Start: 2021-05-24

## 2021-05-24 RX ORDER — FUROSEMIDE 40 MG
1 TABLET ORAL
Qty: 0 | Refills: 0 | DISCHARGE
Start: 2021-05-24

## 2021-05-24 RX ORDER — CLOPIDOGREL BISULFATE 75 MG/1
1 TABLET, FILM COATED ORAL
Qty: 0 | Refills: 0 | DISCHARGE

## 2021-05-24 RX ORDER — INSULIN LISPRO 100/ML
7 VIAL (ML) SUBCUTANEOUS
Refills: 0 | Status: DISCONTINUED | OUTPATIENT
Start: 2021-05-24 | End: 2021-05-25

## 2021-05-24 RX ORDER — INSULIN GLARGINE 100 [IU]/ML
30 INJECTION, SOLUTION SUBCUTANEOUS AT BEDTIME
Refills: 0 | Status: DISCONTINUED | OUTPATIENT
Start: 2021-05-24 | End: 2021-05-25

## 2021-05-24 RX ORDER — AMLODIPINE BESYLATE 2.5 MG/1
1 TABLET ORAL
Qty: 30 | Refills: 0
Start: 2021-05-24 | End: 2021-06-22

## 2021-05-24 RX ORDER — FUROSEMIDE 40 MG
1 TABLET ORAL
Qty: 0 | Refills: 0 | DISCHARGE

## 2021-05-24 RX ORDER — CLOPIDOGREL BISULFATE 75 MG/1
1 TABLET, FILM COATED ORAL
Qty: 0 | Refills: 0 | DISCHARGE
Start: 2021-05-24

## 2021-05-24 RX ORDER — INSULIN DETEMIR 100/ML (3)
10 INSULIN PEN (ML) SUBCUTANEOUS
Qty: 0 | Refills: 0 | DISCHARGE

## 2021-05-24 RX ORDER — EMPAGLIFLOZIN 10 MG/1
1 TABLET, FILM COATED ORAL
Qty: 0 | Refills: 0 | DISCHARGE

## 2021-05-24 RX ORDER — GABAPENTIN 400 MG/1
2 CAPSULE ORAL
Qty: 0 | Refills: 0 | DISCHARGE

## 2021-05-24 RX ADMIN — CARVEDILOL PHOSPHATE 6.25 MILLIGRAM(S): 80 CAPSULE, EXTENDED RELEASE ORAL at 17:24

## 2021-05-24 RX ADMIN — ATORVASTATIN CALCIUM 80 MILLIGRAM(S): 80 TABLET, FILM COATED ORAL at 21:26

## 2021-05-24 RX ADMIN — MEMANTINE HYDROCHLORIDE 5 MILLIGRAM(S): 10 TABLET ORAL at 12:31

## 2021-05-24 RX ADMIN — Medication 325 MILLIGRAM(S): at 12:30

## 2021-05-24 RX ADMIN — INSULIN GLARGINE 30 UNIT(S): 100 INJECTION, SOLUTION SUBCUTANEOUS at 21:25

## 2021-05-24 RX ADMIN — Medication 0.2 MILLIGRAM(S): at 14:46

## 2021-05-24 RX ADMIN — Medication 50 MILLIGRAM(S): at 21:25

## 2021-05-24 RX ADMIN — Medication 40 MILLIGRAM(S): at 17:24

## 2021-05-24 RX ADMIN — Medication 81 MILLIGRAM(S): at 12:30

## 2021-05-24 RX ADMIN — Medication 50 MILLIGRAM(S): at 12:32

## 2021-05-24 RX ADMIN — Medication 7 UNIT(S): at 17:24

## 2021-05-24 RX ADMIN — HEPARIN SODIUM 5000 UNIT(S): 5000 INJECTION INTRAVENOUS; SUBCUTANEOUS at 17:23

## 2021-05-24 RX ADMIN — HEPARIN SODIUM 5000 UNIT(S): 5000 INJECTION INTRAVENOUS; SUBCUTANEOUS at 06:43

## 2021-05-24 RX ADMIN — CLOPIDOGREL BISULFATE 75 MILLIGRAM(S): 75 TABLET, FILM COATED ORAL at 12:30

## 2021-05-24 RX ADMIN — Medication 6: at 08:35

## 2021-05-24 RX ADMIN — LORATADINE 10 MILLIGRAM(S): 10 TABLET ORAL at 12:30

## 2021-05-24 RX ADMIN — Medication 2: at 17:24

## 2021-05-24 RX ADMIN — GABAPENTIN 200 MILLIGRAM(S): 400 CAPSULE ORAL at 21:26

## 2021-05-24 RX ADMIN — Medication 100 MILLIGRAM(S): at 12:31

## 2021-05-24 RX ADMIN — GABAPENTIN 200 MILLIGRAM(S): 400 CAPSULE ORAL at 06:43

## 2021-05-24 RX ADMIN — PREGABALIN 1000 MICROGRAM(S): 225 CAPSULE ORAL at 12:30

## 2021-05-24 RX ADMIN — Medication 12: at 12:31

## 2021-05-24 RX ADMIN — GABAPENTIN 200 MILLIGRAM(S): 400 CAPSULE ORAL at 14:46

## 2021-05-24 RX ADMIN — Medication 0.2 MILLIGRAM(S): at 21:26

## 2021-05-24 NOTE — DISCHARGE NOTE PROVIDER - CARE PROVIDER_API CALL
Odilon Moseley)  Cardiovascular Disease; Internal Medicine  [address], [address]  [place] [address]  Phone: [phone]  Fax: [phone]  Follow Up Time:

## 2021-05-24 NOTE — PROGRESS NOTE ADULT - ASSESSMENT
85F hx DM2, CAD, PAD s/p femoral PCI, mild dementia, HTN, HLD, CKD presents with worsening weakness and slurred speech, hyperglycemia.    CVA   MRI  Acute small right occipital lobe cortical infarct. Acute punctate right paramedian frontal lobe infarct.  Slurred speech and Ataxia due to CVA  - Carotid duplex no significant stenosis  - asa 81 daily and atorvastatin 80 mg     DM2 with hyperglycemia  with Diabetic neuropathy  a1c 11.3%  lantus increased with premeal advised better diet and exercise    Elevated lipase  -ct negative for pancreatitis  -May be related to poorly controlled blood sugar     ckd - avoid nephrtoxoci agents    HTN  -c/w clonidine, hydralazine, lasix and clonidine    CAD, PAD  -c/w aspirin and Plavix  -c/w carvedilol    CKD  -creatinine at baseline  -renal dose meds  -continue to monitor    Severe Aortic stenosis.  possible amyloid noted on echo in a diabetic of advanced age with CKD suggested by echo. Conservative management for now per cardiology   Per cards Will need evaluation and consideration of possible TAVR following resolution of acute stroke symptoms and recovery from CVA.  This will be done in the outpt setting as timing not ideal in light of acute stroke.    dc to [facility] tomorrow     Disposition:  DC to home vs rehab.  Discussed with granddaugther.  said they will like to have her home with home health aid . SW consult

## 2021-05-24 NOTE — DISCHARGE NOTE PROVIDER - NSDCCPCAREPLAN_GEN_ALL_CORE_FT
PRINCIPAL DISCHARGE DIAGNOSIS  Diagnosis: Ataxia  Assessment and Plan of Treatment:       SECONDARY DISCHARGE DIAGNOSES  Diagnosis: Pancreatitis  Assessment and Plan of Treatment:

## 2021-05-24 NOTE — DISCHARGE NOTE PROVIDER - HOSPITAL COURSE
85F hx DM2, CAD, PAD s/p femoral PCI, mild dementia, HTN, HLD, CKD presents with worsening weakness and slurred speech. She states that her mothers glucose has been poorly controlled and recently decdied to start patient on levemir. She was started on 8 units qhs to increase to 10. 2 nights ago glucose found to be 420. Daughter gave insulin, but glucose continued to rise. She also noticed mom to be generally weak, slurring her words and having unsteady gait. These symptoms continued and glucose continued it to be elevated prompting her to bring her to ER.     In ER, CtH negative. Daughter states symptoms improving and mother near baseline. Pt admitted to medicine and seen by cardio and neuro in consult.     patients a1c greater than 11    mri - IMPRESSION: Acute small right occipital lobe cortical infarct. Acute punctate right paramedian frontal lobe infarct.  patient seen by cardio who recommedned dc with asa and plavix and no further work up     time spent on dc 32 minutes    pe  GENERAL: NAD, well-groomed  HEENT: PERRL, +EOMI, anicteric, no [place]  NECK: Supple, No JVD   CHEST/LUNG: CTA bilaterally; Normal effort  HEART: S1S2 Normal intensity, no murmurs, gallops or rubs noted  ABDOMEN: Soft, BS Normoactive, NT, ND, no HSM noted  EXTREMITIES:  2+ radial and DP pulses noted, no clubbing, cyanosis, or edema noted, FROM x 4  SKIN: No rashes or lesions noted  NEURO: A&Ox2-3    CVA   MRI  Acute small right occipital lobe cortical infarct. Acute punctate right paramedian frontal lobe infarct.  Slurred speech and Ataxia due to CVA  - Carotid duplex no significant stenosis  - asa 81 and plavix daily and atorvastatin 80 mg     DM2 with hyperglycemia  a1c 11.3%  -lantus     Elevated lipase  -ct negative for pancreatitis  -May be related to poorly controlled blood sugar     HTN  -c/w clonidine, hydralazine, lasix and clonidine    CAD, PAD  -c/w aspirin and Plavix  -c/w carvedilol    CKD  -cr at baseline  NO ACE-I     Severe Aortic stenosis.  possible amyloid noted on echo in a diabetic of advanced age with CKD suggested by echo. Conservative management for now per cardiology   Per cards Will need evaluation and consideration of possible TAVR following resolution of acute stroke symptoms and recovery from CVA.  This will be done in the outpt setting as timing not ideal in light of acute stroke.

## 2021-05-24 NOTE — DISCHARGE NOTE PROVIDER - NSDCFUSCHEDAPPT_GEN_ALL_CORE_FT
[facility] ; 06/16/2021 ; NPP [facility] ; 06/28/2021 ; NPP [facility] [address] [facility] ; 07/09/2021 ; NPP Nephro [address]

## 2021-05-24 NOTE — DISCHARGE NOTE PROVIDER - NSDCMRMEDTOKEN_GEN_ALL_CORE_FT
aspirin 81 mg oral delayed release tablet: 1 tab(s) orally once a day  atorvastatin 80 mg oral tablet: 1 tab(s) orally once a day (at bedtime)  carvedilol 12.5 mg oral tablet: 1 tab(s) orally 2 times a day  clopidogrel 75 mg oral tablet: 1 tab(s) orally once a day  ferrous sulfate (as elemental iron) 45 mg oral tablet, extended release: 1 tab(s) orally once a day  Foltanx oral tablet: 1 tab(s) orally once a day  furosemide 40 mg oral tablet: 1 tab(s) orally 2 times a day  gabapentin 100 mg oral capsule: 2 cap(s) orally 3 times a day  hydrALAZINE 100 mg oral tablet: 1 tab(s) orally every 8 hours  Januvia 100 mg oral tablet: 1 tab(s) orally once a day  Levemir 100 units/mL subcutaneous solution: 30 unit(s) subcutaneous once a day  levocetirizine 5 mg oral tablet: 1 tab(s) orally once a day (in the evening)  lisinopril 10 mg oral tablet: 1 tab(s) orally once a day   memantine 5 mg oral tablet: 1 tab(s) orally once a day  Vitamin B12 1000 mcg oral tablet: 1 tab(s) orally once a day  Vitamin B6 100 mg oral tablet: 1 tab(s) orally once a day  
I have personally seen and examined this patient.  I have fully participated in the care of this patient. I have reviewed all pertinent clinical information, including history, physical exam, plan and the Resident’s note and agree except as noted.

## 2021-05-25 VITALS
DIASTOLIC BLOOD PRESSURE: 66 MMHG | RESPIRATION RATE: 16 BRPM | TEMPERATURE: 97 F | HEART RATE: 66 BPM | OXYGEN SATURATION: 98 % | SYSTOLIC BLOOD PRESSURE: 164 MMHG

## 2021-05-25 LAB
ALBUMIN SERPL ELPH-MCNC: 3.6 G/DL — SIGNIFICANT CHANGE UP (ref 3.3–5.2)
ALP SERPL-CCNC: 73 U/L — SIGNIFICANT CHANGE UP (ref 40–120)
ALT FLD-CCNC: 11 U/L — SIGNIFICANT CHANGE UP
ANION GAP SERPL CALC-SCNC: 11 MMOL/L — SIGNIFICANT CHANGE UP (ref 5–17)
AST SERPL-CCNC: 11 U/L — SIGNIFICANT CHANGE UP
BILIRUB SERPL-MCNC: 0.2 MG/DL — LOW (ref 0.4–2)
BUN SERPL-MCNC: 64 MG/DL — HIGH (ref 8–20)
CALCIUM SERPL-MCNC: 8.9 MG/DL — SIGNIFICANT CHANGE UP (ref 8.6–10.2)
CHLORIDE SERPL-SCNC: 97 MMOL/L — LOW (ref 98–107)
CO2 SERPL-SCNC: 29 MMOL/L — SIGNIFICANT CHANGE UP (ref 22–29)
CREAT SERPL-MCNC: 2.75 MG/DL — HIGH (ref 0.5–1.3)
GLUCOSE BLDC GLUCOMTR-MCNC: 115 MG/DL — HIGH (ref 70–99)
GLUCOSE BLDC GLUCOMTR-MCNC: 166 MG/DL — HIGH (ref 70–99)
GLUCOSE BLDC GLUCOMTR-MCNC: 228 MG/DL — HIGH (ref 70–99)
GLUCOSE SERPL-MCNC: 188 MG/DL — HIGH (ref 70–99)
POTASSIUM SERPL-MCNC: 3.5 MMOL/L — SIGNIFICANT CHANGE UP (ref 3.5–5.3)
POTASSIUM SERPL-SCNC: 3.5 MMOL/L — SIGNIFICANT CHANGE UP (ref 3.5–5.3)
PROT SERPL-MCNC: 6.5 G/DL — LOW (ref 6.6–8.7)
SODIUM SERPL-SCNC: 137 MMOL/L — SIGNIFICANT CHANGE UP (ref 135–145)

## 2021-05-25 PROCEDURE — 36415 COLL VENOUS BLD VENIPUNCTURE: CPT

## 2021-05-25 PROCEDURE — 86769 SARS-COV-2 COVID-19 ANTIBODY: CPT

## 2021-05-25 PROCEDURE — U0005: CPT

## 2021-05-25 PROCEDURE — 80061 LIPID PANEL: CPT

## 2021-05-25 PROCEDURE — 70450 CT HEAD/BRAIN W/O DYE: CPT

## 2021-05-25 PROCEDURE — 83735 ASSAY OF MAGNESIUM: CPT

## 2021-05-25 PROCEDURE — 87635 SARS-COV-2 COVID-19 AMP PRB: CPT

## 2021-05-25 PROCEDURE — U0003: CPT

## 2021-05-25 PROCEDURE — 93005 ELECTROCARDIOGRAM TRACING: CPT

## 2021-05-25 PROCEDURE — 74176 CT ABD & PELVIS W/O CONTRAST: CPT

## 2021-05-25 PROCEDURE — 84295 ASSAY OF SERUM SODIUM: CPT

## 2021-05-25 PROCEDURE — 93880 EXTRACRANIAL BILAT STUDY: CPT

## 2021-05-25 PROCEDURE — 84100 ASSAY OF PHOSPHORUS: CPT

## 2021-05-25 PROCEDURE — 83605 ASSAY OF LACTIC ACID: CPT

## 2021-05-25 PROCEDURE — 82803 BLOOD GASES ANY COMBINATION: CPT

## 2021-05-25 PROCEDURE — 97530 THERAPEUTIC ACTIVITIES: CPT

## 2021-05-25 PROCEDURE — 85027 COMPLETE CBC AUTOMATED: CPT

## 2021-05-25 PROCEDURE — 99285 EMERGENCY DEPT VISIT HI MDM: CPT | Mod: 25

## 2021-05-25 PROCEDURE — 71045 X-RAY EXAM CHEST 1 VIEW: CPT

## 2021-05-25 PROCEDURE — 82947 ASSAY GLUCOSE BLOOD QUANT: CPT

## 2021-05-25 PROCEDURE — 84484 ASSAY OF TROPONIN QUANT: CPT

## 2021-05-25 PROCEDURE — 84132 ASSAY OF SERUM POTASSIUM: CPT

## 2021-05-25 PROCEDURE — 85025 COMPLETE CBC W/AUTO DIFF WBC: CPT

## 2021-05-25 PROCEDURE — 99239 HOSP IP/OBS DSCHRG MGMT >30: CPT

## 2021-05-25 PROCEDURE — 83690 ASSAY OF LIPASE: CPT

## 2021-05-25 PROCEDURE — 82962 GLUCOSE BLOOD TEST: CPT

## 2021-05-25 PROCEDURE — 82435 ASSAY OF BLOOD CHLORIDE: CPT

## 2021-05-25 PROCEDURE — 81001 URINALYSIS AUTO W/SCOPE: CPT

## 2021-05-25 PROCEDURE — 83036 HEMOGLOBIN GLYCOSYLATED A1C: CPT

## 2021-05-25 PROCEDURE — 85018 HEMOGLOBIN: CPT

## 2021-05-25 PROCEDURE — 85014 HEMATOCRIT: CPT

## 2021-05-25 PROCEDURE — 93306 TTE W/DOPPLER COMPLETE: CPT

## 2021-05-25 PROCEDURE — 80053 COMPREHEN METABOLIC PANEL: CPT

## 2021-05-25 PROCEDURE — 70551 MRI BRAIN STEM W/O DYE: CPT

## 2021-05-25 PROCEDURE — 82330 ASSAY OF CALCIUM: CPT

## 2021-05-25 PROCEDURE — 80048 BASIC METABOLIC PNL TOTAL CA: CPT

## 2021-05-25 RX ADMIN — CLOPIDOGREL BISULFATE 75 MILLIGRAM(S): 75 TABLET, FILM COATED ORAL at 12:47

## 2021-05-25 RX ADMIN — GABAPENTIN 200 MILLIGRAM(S): 400 CAPSULE ORAL at 05:18

## 2021-05-25 RX ADMIN — HEPARIN SODIUM 5000 UNIT(S): 5000 INJECTION INTRAVENOUS; SUBCUTANEOUS at 16:33

## 2021-05-25 RX ADMIN — MEMANTINE HYDROCHLORIDE 5 MILLIGRAM(S): 10 TABLET ORAL at 12:47

## 2021-05-25 RX ADMIN — Medication 0.2 MILLIGRAM(S): at 15:37

## 2021-05-25 RX ADMIN — Medication 7 UNIT(S): at 08:10

## 2021-05-25 RX ADMIN — HEPARIN SODIUM 5000 UNIT(S): 5000 INJECTION INTRAVENOUS; SUBCUTANEOUS at 05:39

## 2021-05-25 RX ADMIN — CARVEDILOL PHOSPHATE 6.25 MILLIGRAM(S): 80 CAPSULE, EXTENDED RELEASE ORAL at 16:32

## 2021-05-25 RX ADMIN — LORATADINE 10 MILLIGRAM(S): 10 TABLET ORAL at 12:46

## 2021-05-25 RX ADMIN — Medication 650 MILLIGRAM(S): at 05:19

## 2021-05-25 RX ADMIN — Medication 40 MILLIGRAM(S): at 16:31

## 2021-05-25 RX ADMIN — Medication 325 MILLIGRAM(S): at 12:47

## 2021-05-25 RX ADMIN — Medication 40 MILLIGRAM(S): at 05:19

## 2021-05-25 RX ADMIN — PREGABALIN 1000 MICROGRAM(S): 225 CAPSULE ORAL at 12:46

## 2021-05-25 RX ADMIN — Medication 7 UNIT(S): at 12:45

## 2021-05-25 RX ADMIN — Medication 100 MILLIGRAM(S): at 12:46

## 2021-05-25 RX ADMIN — Medication 50 MILLIGRAM(S): at 05:18

## 2021-05-25 RX ADMIN — Medication 81 MILLIGRAM(S): at 12:46

## 2021-05-25 RX ADMIN — Medication 50 MILLIGRAM(S): at 15:37

## 2021-05-25 RX ADMIN — GABAPENTIN 200 MILLIGRAM(S): 400 CAPSULE ORAL at 15:37

## 2021-05-25 RX ADMIN — Medication 4: at 12:45

## 2021-05-25 NOTE — PROGRESS NOTE ADULT - ASSESSMENT
85F hx DM2, CAD, PAD s/p femoral PCI, mild dementia, HTN, HLD, CKD presents with worsening weakness and slurred speech, hyperglycemia.    CVA  - MRI: Acute small right occipital lobe cortical infarct. Acute punctate right paramedian frontal lobe infarct.  - Slurred speech and Ataxia due to CVA  - Carotid duplex without significant stenosis  - ASA 81 daily and atorvastatin 80 mg     DM2 with hyperglycemia  with Diabetic neuropathy  a1c 11.3%  - Lantus increased to 30 units at bedtime with premeal coverage  - Advised better diet and exercise    Elevated lipase  - CT negative for pancreatitis  - May be related to poorly controlled blood sugar     CKD  - creatinine at baseline  - renal dose meds  - continue to monitor  - avoid nephrotoxic agents    HTN  - c/w clonidine, hydralazine, Lasix and clonidine    CAD, PAD  -c/w aspirin and Plavix  -c/w carvedilol    Severe Aortic stenosis  possible amyloid noted on echo in a diabetic of advanced age with CKD suggested by echo. Conservative management for now per cardiology   Will need outpatient follow-up at [facility] for evaluation and consideration of possible TAVR following resolution of acute stroke symptoms and recovery from CVA.  This will be done in the outpt setting as timing not ideal in light of acute stroke.      Disposition: Medically stable for d/c, to [facility] pending auth

## 2021-05-25 NOTE — PROGRESS NOTE ADULT - PROVIDER SPECIALTY LIST ADULT
Cardiology
Cardiology
Hospitalist
Hospitalist
Neurology
Cardiology
Hospitalist
Hospitalist
Internal Medicine
Cardiology

## 2021-05-25 NOTE — PROGRESS NOTE ADULT - REASON FOR ADMISSION
slurred speech
verbal cues/nonverbal cues (demo/gestures)/supervision/2 person assist

## 2021-05-25 NOTE — PROGRESS NOTE ADULT - SUBJECTIVE AND OBJECTIVE BOX
[facility], [facility]                                                    [address], [address], [place] [address]                                                         PHONE: [phone]    FAX: [phone] [address], [address], [place] [address]                                                 PHONE: [phone]    FAX: [phone]  *******************************************************************************  cc: slurred speech    HPI:PAULINO LAM is a 85y Female admitted with slurred speech and difficulty with balance, now resolved. Pt denies focal weakness or visual deficit. Pt denies active chest pain.  There is no SOB. No palpitations, PND or orthopnea. No dizziness or syncope reported. No fever, chills or constitutional symptoms. History of HTN, Hl, PAD s/p LE vascular stent therapy, aortic stenosis, CKD.   Initial CT brain negative; MRI brain reveals acute right occipital lobe infarct as well as bilateral old frontal lobe infarcts (multiple distributions).       Overnight events/Subjective Assessment: no new or recurrent neurologic symptoms.     INTERPRETATION OF TELEMETRY (personally reviewed): All sinus rhythm. No arrhythmia.     PAST MEDICAL & SURGICAL HISTORY:  HTN (hypertension)    High cholesterol    DM (diabetes mellitus)    Cardiomegaly    PAD (peripheral artery disease)    H/O cataract        No Known Allergies      MEDICATIONS  (STANDING):  aspirin enteric coated 325 milliGRAM(s) Oral daily  atorvastatin 80 milliGRAM(s) Oral at bedtime  carvedilol 12.5 milliGRAM(s) Oral every 12 hours  cloNIDine 0.2 milliGRAM(s) Oral every 8 hours  clopidogrel Tablet 75 milliGRAM(s) Oral daily  cyanocobalamin 1000 MICROGram(s) Oral daily  dextrose 40% Gel 15 Gram(s) Oral once  dextrose 5%. 1000 milliLiter(s) (50 mL/Hr) IV Continuous <Continuous>  dextrose 5%. 1000 milliLiter(s) (100 mL/Hr) IV Continuous <Continuous>  dextrose 50% Injectable 25 Gram(s) IV Push once  dextrose 50% Injectable 12.5 Gram(s) IV Push once  dextrose 50% Injectable 25 Gram(s) IV Push once  ferrous    sulfate 325 milliGRAM(s) Oral daily  furosemide    Tablet 40 milliGRAM(s) Oral two times a day  gabapentin 200 milliGRAM(s) Oral three times a day  glucagon  Injectable 1 milliGRAM(s) IntraMuscular once  hydrALAZINE 100 milliGRAM(s) Oral every 8 hours  insulin glargine Injectable (LANTUS) 15 Unit(s) SubCutaneous at bedtime  insulin lispro (ADMELOG) corrective regimen sliding scale   SubCutaneous three times a day before meals  insulin lispro (ADMELOG) corrective regimen sliding scale   SubCutaneous at bedtime  loratadine 10 milliGRAM(s) Oral daily  memantine 5 milliGRAM(s) Oral daily  pyridoxine 100 milliGRAM(s) Oral daily    MEDICATIONS  (PRN):      Vital Signs Last 24 Hrs  T(C): 37.4 (21 May 2021 08:00), Max: 37.4 (21 May 2021 08:00)  T(F): 99.3 (21 May 2021 08:00), Max: 99.3 (21 May 2021 08:00)  HR: 55 (21 May 2021 08:00) (51 - 69)  BP: 101/34 (21 May 2021 08:00) (101/34 - 176/80)  BP(mean): 51 (21 May 2021 08:00) (51 - 84)  RR: 16 (21 May 2021 08:00) (12 - 19)  SpO2: 97% (21 May 2021 08:00) (94% - 100%)    I&O's Detail    20 May 2021 07:01  -  21 May 2021 07:00  --------------------------------------------------------  IN:    Oral Fluid: 240 mL  Total IN: 240 mL    OUT:    Voided (mL): 1400 mL  Total OUT: 1400 mL    Total NET: -1160 mL        I&O's Summary    20 May 2021 07:01  -  21 May 2021 07:00  --------------------------------------------------------  IN: 240 mL / OUT: 1400 mL / NET: -1160 mL            PHYSICAL EXAM:  General: Appears well developed, well nourished, no acute distress. not in acute pain  HEAD: normal cephalic. Atraumatic  PUPILS: equal and reactive to light  EARS: normal hearing  NECK: supple. no JVD or HJR. no carotid bruits. no visible lymphadenopathy  NOSE: no gross abnormalities  CHEST: symmetric chest wall expansion  CARDIOVASCULAR: Normal rate. Regular rhythm. Normal S1 and S2, no S3/S4,  I/VI systolic murmur, no rub, or gallop  LUNGS: Normal effort. Normal respiratory rate. Breath sounds are clear to auscultation bilaterally. No respiratory distress. No stridor.  no rales, rhonchi or wheeze. no decreased Breath sounds  ABDOMEN: Soft, nontender, non-distended, positive bowel sounds, no mass or bruit. no abdominal tenderness. No rebound. no ascites  EXTREMITIES: No clubbing, cyanosis or edema. normal range of motion  PULSES:  distal pulses WNL  SKIN: Warm and dry with normal turgor. no visible rash or cyanosis   NEURO: Alert & oriented x 3, grossly intact with no focal weakness  PSYCH: normal mood and affect. Grossly normal insight and judgement exhibited    FAMILY HISTORY:  No pertinent family history of ischemic heart disease in mother, father or in first degree relatives        SOCIAL HISTORY: no active smoking. No ETOH/No IVDA    REVIEW OF SYSTEMS:  Constitutional: no fever, chills or malaise. No weight loss  Head: no trauma  Eyes: no visual deficit. No double vision  Ears: no hearing deficit or ringing in the ears  Nose: no nose bleeds or smell changes or congestion  Throat: no difficult swallowing or painful swallowing  Neck: supple. No lymphadenopathy or swelling  Respiratory: no SOB, wheeze, asthma, COPD. No cough. No blood in the sputum  Cardiovascular: no CP, palpitations, irregular heart beats. No edema. No PND. No orthopnea. No skin/temperature or color changes  Gastrointestinal: no abdominal pain. No constipation. No diarrhea. No melena. No nausea. No vomiting. No bloating  Genitourinary: no frequency or urgency. No hematuria  Lymphatics: no grossly swollen lymph nodes  Musculoskeletal: no limitation of range of motion. Normal strength. No pain  Integumentary: no visible rash. No itching  Neurologic: no HA. +slurred speech. + unsteady gait.  No seizure. No hx of epilepsy. No tingling or numbness. No weakness. No dizziness  Psychiatric: denied. Reports appropriate mood.        LABS:                        12.5   9.15  )-----------( 254      ( 21 May 2021 05:30 )             38.3         138  |  100  |  51.0<H>  ----------------------------<  223<H>  3.3<L>   |  26.0  |  2.21<H>    Ca    8.7      21 May 2021 05:30  Phos  3.0     -  Mg     2.2         TPro  6.3<L>  /  Alb  3.3  /  TBili  0.3<L>  /  DBili  x   /  AST  12  /  ALT  13  /  AlkPhos  74  -    CARDIAC MARKERS ( 20 May 2021 00:18 )  x     / <0.01 ng/mL / x     / x     / x            serum  Lipids:         RADIOLOGY & ADDITIONAL STUDIES:    EC S.rocco 55.LAHB LVH. old AWML Tw inversion laterally    ECHO: < from: TTE Echo Complete w/o Contrast w/ Doppler (21 @ 09:18) >  Left Ventricle: The left ventricular internal cavity size is normal. Left ventricular wall thickness is moderately increased.  Global LV systolic function was hyperdynamic. Left ventricular ejection fraction, by visual estimation, is 70 to 75%. Spectral Doppler shows impaired relaxation pattern of left ventricular myocardial filling (Grade I diastolic dysfunction).  Right Ventricle: The right ventricular size is normal. RV systolic function is normal.  Left Atrium: Mildly enlarged left atrium.  Right Atrium: Normal right atrial size.  Pericardium: There is no evidence of pericardial effusion.  Mitral Valve: The mitral valve is normal in structure. Trace mitral valve regurgitation is seen.  Tricuspid Valve: The tricuspid valve is normal in structure. Trivial tricuspid regurgitation is visualized.  Aortic Valve: Peak transaortic gradient equals 45.5 mmHg, mean transaortic gradient equals 28.4 mmHg, the calculated aortic valve area equals 0.75 cm² by the continuity equation consistent with severe aortic stenosis. The peak aortic velocity was obtained from the right parasternal view. Mild aortic valve regurgitation is seen.  Pulmonic Valve: Structurally normal pulmonic valve, with normal leaflet excursion. Trace pulmonic valve regurgitation.  Aorta: The aortic root is normal in size and structure.  Pulmonary Artery: The main pulmonary artery is normal in size.    < end of copied text >      < from: CT Head No Cont (21 @ 22:56) >  IMPRESSION:   No intracranial bleed, extra-axial fluid collection, mass effect, midline shift or acute territorial infarct evident.    Multifocal chronic infarcts and periventricular white matter small vessel ischemic disease again seen.    Previously demonstrated tiny acute lacunar infarcts of the right occipital lobe and right paramedian frontal lobe are suboptimally demonstrated on this exam.    < end of copied text >    < from: US Duplex Carotid Arteries Complete, Bilateral (21 @ 16:48) >  IMPRESSION: No significant hemodynamic stenosis of either carotid artery.    < end of copied text >    < from: MR Head No Cont (21 @ 10:32) >  IMPRESSION: Acute small right occipital lobe cortical infarct. Acute punctate right paramedian frontal lobe infarct.    < end of copied text >    < from: CT Abdomen and Pelvis w/ Oral Cont (21 @ 03:01) >  IMPRESSION:  No acute intra-abdominal pathology on CT.    < end of copied text >    < from: Xray Chest 1 View-PORTABLE IMMEDIATE (21 @ 00:40) >  IMPRESSION: No acute cardiopulmonary disease process. Cardiomegaly.    < end of copied text >      ASSESSMENT AND PLAN:  In summary, PAULINO LAM is a 85y Female with past medical history significant for  slurred speech and difficulty with balance, now resolved. Pt denies focal weakness or visual deficit. Pt denies active chest pain.  There is no SOB. No palpitations, PND or orthopnea. No dizziness or syncope reported. No fever, chills or constitutional symptoms. History of HTN, Hl, PAD s/p LE vascular stent therapy, aortic stenosis, CKD.   Initial CT brain negative; MRI brain reveals acute right occipital lobe infarct as well as bilateral old frontal lobe infarcts (multiple distributions).      - Acute small right occipital infarct. acute punctate right paramedian frontal lobe infarct. Multifocal chronic infarcts and periventricular white matter small vessel ischemic disease again seen.  Previously demonstrated tiny acute lacunar infarcts of the right occipital lobe and right paramedian frontal lobe are suboptimally demonstrated on this exam. No intracranial bleed. Maintain ASA and plavix    - Carotids non obstructive    - HTN. BP is controlled. maintain coreg 12.5mg po q12, lasix 40mg po BID, clonidine o.2 every 8 hours, hydralazine 100mg every 8 hours    - HL. atorvastatin 80mg daily. chol 161, trig 267, HDL 39, LDL 69. Would recheck triglycerides once glucose is adequately controlled    - Telemetry monitoring personally reviewed. All SR.  Will plan for 14 day zio monitor in the outpt setting to assess for PAF due to infarcts in multiple territories suggesting embolic event with plan for consideration of ILR    - Pt with normal LV function by echo with a hyperdynamic left ventricle    - Severe Aortic stenosis. On echo mean gradient 28.4 with an PILO of 0.75.  Will need evaluation and consideration of possible TAVR following resolution of acute stroke symptoms and recovery from CVA.  This will be done in the outpt setting as timing not ideal in light of acute stroke.     - Dizziness reported last evening with repeat CT performed. No hemorrhagic conversion    - DM. hyperglycemia. Management per medical. Glu 278 with A1C of 11.3    - CKD Cr 2.21 with GFR 20 in a diabetic. Avoid nephrotoxic agents. Continue to monitor    - No obvious intracardiac shunts on TTE.     - ?amyloid in a diabetic of advanced age with CKD suggested by echo. Conservative management for now    - nuclear stress  no evidence of stress induced ischemic    - Telemetry monitoring personally reviewed by me. All SR. no tachy or rocco arrhythmias    - ECG personally reviewed by me    - radiologic imaging reviewed    - Laboratory data reviewed.    - I personally spoke with nursing at the bedside    - I have personally reviewed all obtainable prior records and data    We will follow with you    Lucinda Krueger MD
CC: Small right acute occipital CVA with slurred speech.  Dementia . CKD 4. DM  HPI:  85F hx DM2, CAD, PAD s/p femoral PCI, mild dementia, HTN, HLD, CKD presents with worsening weakness and slurred speech. Daughter at bedside assisted with history. She states that her mothers glucose has been poorly controlled and recently decdied to start patient on levemir. She was started on 8 units qhs to increase to 10. 2 nights ago glucose found to be 420. Daughter gave insulin, but glucose continued to rise. She also noticed mom to be generally weak, slurring her words and having unsteady gait. These symptoms continued and glucose continued it to be elevated prompting her to bring her to ER.     In ER, CtH negative. Daughter states symptoms improving and mother near baseline. Pt complains of nausea and generalized weakness. Deneis focal weakness or numbness. Denies other complaints.  (20 May 2021 05:40)    REVIEW OF SYSTEMS:    Patient denied fever, chills, abdominal pain, nausea, vomiting, cough, shortness of breath, chest pain or palpitations    Vital Signs Last 24 Hrs  T(C): 36.7 (22 May 2021 14:07), Max: 37.1 (21 May 2021 20:00)  T(F): 98.1 (22 May 2021 14:07), Max: 98.8 (21 May 2021 20:00)  HR: 57 (22 May 2021 14:07) (45 - 65)  BP: 127/34 (22 May 2021 12:01) (113/39 - 176/54)  BP(mean): 58 (22 May 2021 12:01) (57 - 84)  RR: 18 (22 May 2021 12:01) (16 - 18)  SpO2: 97% (22 May 2021 12:01) (94% - 97%)I&O's Summary    21 May 2021 07:01  -  22 May 2021 07:00  --------------------------------------------------------  IN: 240 mL / OUT: 500 mL / NET: -260 mL      PHYSICAL EXAM:  GENERAL: NAD,   HEENT: PERRL, +EOMI, anicteric, no [place]  NECK: Supple, No JVD   CHEST/LUNG: CTA bilaterally; Normal effort  HEART: S1S2 Normal intensity, no murmurs, gallops or rubs noted  ABDOMEN: Soft, BS Normoactive, NT, ND, no HSM noted  EXTREMITIES:  2+ radial and DP pulses noted, no clubbing, cyanosis, or edema noted, Limited mobility   SKIN: No rashes or lesions noted  NEURO: A&Ox3, no focal deficits noted, CN II-XII intact  PSYCH: Depressed  mood and affect; insight/judgement inappropriate  LABS:                        11.9   6.86  )-----------( 218      ( 22 May 2021 06:32 )             38.1     05-22    139  |  101  |  55.0<H>  ----------------------------<  104<H>  3.2<L>   |  25.0  |  2.74<H>    Ca    8.5<L>      22 May 2021 06:32  Mg     2.4     05-22    TPro  6.1<L>  /  Alb  3.3  /  TBili  0.2<L>  /  DBili  x   /  AST  11  /  ALT  11  /  AlkPhos  64  05-22        RADIOLOGY & ADDITIONAL TESTS:    MEDICATIONS:  MEDICATIONS  (STANDING):  aspirin enteric coated 81 milliGRAM(s) Oral daily  atorvastatin 80 milliGRAM(s) Oral at bedtime  carvedilol 6.25 milliGRAM(s) Oral every 12 hours  cloNIDine 0.2 milliGRAM(s) Oral every 8 hours  clopidogrel Tablet 75 milliGRAM(s) Oral daily  cyanocobalamin 1000 MICROGram(s) Oral daily  dextrose 40% Gel 15 Gram(s) Oral once  dextrose 5%. 1000 milliLiter(s) (50 mL/Hr) IV Continuous <Continuous>  dextrose 5%. 1000 milliLiter(s) (100 mL/Hr) IV Continuous <Continuous>  dextrose 50% Injectable 25 Gram(s) IV Push once  dextrose 50% Injectable 12.5 Gram(s) IV Push once  dextrose 50% Injectable 25 Gram(s) IV Push once  ferrous    sulfate 325 milliGRAM(s) Oral daily  furosemide    Tablet 40 milliGRAM(s) Oral two times a day  gabapentin 200 milliGRAM(s) Oral three times a day  glucagon  Injectable 1 milliGRAM(s) IntraMuscular once  heparin   Injectable 5000 Unit(s) SubCutaneous every 12 hours  insulin glargine Injectable (LANTUS) 15 Unit(s) SubCutaneous at bedtime  insulin lispro (ADMELOG) corrective regimen sliding scale   SubCutaneous three times a day before meals  insulin lispro (ADMELOG) corrective regimen sliding scale   SubCutaneous at bedtime  loratadine 10 milliGRAM(s) Oral daily  memantine 5 milliGRAM(s) Oral daily  pyridoxine 100 milliGRAM(s) Oral daily    MEDICATIONS  (PRN):  
[facility] Division of Hospital Medicine    Chief Complaint:  Slurred speech    SUBJECTIVE / OVERNIGHT EVENTS: Overnight patient felt dizzy. CTH done, no acute bleed. Patient seen and examined this morning. She is only oriented to self and place. Not to time. Reports bilateral leg and feet pain describes as burning.     Patient denies chest pain, SOB, abd pain, N/V, fever, chills, dysuria or any other complaints. All remainder ROS negative.     MEDICATIONS  (STANDING):  aspirin enteric coated 325 milliGRAM(s) Oral daily  atorvastatin 80 milliGRAM(s) Oral at bedtime  carvedilol 12.5 milliGRAM(s) Oral every 12 hours  cloNIDine 0.2 milliGRAM(s) Oral every 8 hours  clopidogrel Tablet 75 milliGRAM(s) Oral daily  cyanocobalamin 1000 MICROGram(s) Oral daily  dextrose 40% Gel 15 Gram(s) Oral once  dextrose 5%. 1000 milliLiter(s) (50 mL/Hr) IV Continuous <Continuous>  dextrose 5%. 1000 milliLiter(s) (100 mL/Hr) IV Continuous <Continuous>  dextrose 50% Injectable 25 Gram(s) IV Push once  dextrose 50% Injectable 12.5 Gram(s) IV Push once  dextrose 50% Injectable 25 Gram(s) IV Push once  ferrous    sulfate 325 milliGRAM(s) Oral daily  furosemide    Tablet 40 milliGRAM(s) Oral two times a day  gabapentin 200 milliGRAM(s) Oral three times a day  glucagon  Injectable 1 milliGRAM(s) IntraMuscular once  hydrALAZINE 100 milliGRAM(s) Oral every 8 hours  insulin glargine Injectable (LANTUS) 15 Unit(s) SubCutaneous at bedtime  insulin lispro (ADMELOG) corrective regimen sliding scale   SubCutaneous three times a day before meals  insulin lispro (ADMELOG) corrective regimen sliding scale   SubCutaneous at bedtime  loratadine 10 milliGRAM(s) Oral daily  memantine 5 milliGRAM(s) Oral daily  pyridoxine 100 milliGRAM(s) Oral daily    MEDICATIONS  (PRN):        I&O's Summary    20 May 2021 07:01  -  21 May 2021 07:00  --------------------------------------------------------  IN: 240 mL / OUT: 1400 mL / NET: -1160 mL        PHYSICAL EXAM:  Vital Signs Last 24 Hrs  T(C): 37.4 (21 May 2021 08:00), Max: 37.4 (21 May 2021 08:00)  T(F): 99.3 (21 May 2021 08:00), Max: 99.3 (21 May 2021 08:00)  HR: 55 (21 May 2021 08:00) (51 - 69)  BP: 101/34 (21 May 2021 08:00) (101/34 - 176/80)  BP(mean): 51 (21 May 2021 08:00) (51 - 84)  RR: 16 (21 May 2021 08:00) (12 - 19)  SpO2: 97% (21 May 2021 08:00) (94% - 100%)      CONSTITUTIONAL: NAD, well-developed, well-groomed  ENMT: Moist oral mucosa, no pharyngeal injection or exudates;   RESPIRATORY: Normal respiratory effort; crackles in the bases  CARDIOVASCULAR: Regular rate and rhythm, normal S1 and S2, no murmur/rub/gallop; No lower extremity edema;   ABDOMEN: Nontender to palpation, normoactive bowel sounds, no rebound/guarding;  MUSCLOSKELETAL: no clubbing or cyanosis of digits; no joint swelling or tenderness to palpation  PSYCH: A+O to person, place, but not to time; affect appropriate  NEUROLOGY: CN 2-12 are intact and symmetric; no gross sensory deficits;   SKIN: No rashes; no palpable lesions    LABS:                        12.5   9.15  )-----------( 254      ( 21 May 2021 05:30 )             38.3     05-21    138  |  100  |  51.0<H>  ----------------------------<  223<H>  3.3<L>   |  26.0  |  2.21<H>    Ca    8.7      21 May 2021 05:30  Phos  3.0     05-20  Mg     2.2     05-21    TPro  6.3<L>  /  Alb  3.3  /  TBili  0.3<L>  /  DBili  x   /  AST  12  /  ALT  13  /  AlkPhos  74  05-21      CARDIAC MARKERS ( 20 May 2021 00:18 )  x     / <0.01 ng/mL / x     / x     / x          Urinalysis Basic - ( 20 May 2021 00:27 )    Color: Yellow / Appearance: Clear / S.015 / pH: x  Gluc: x / Ketone: Negative  / Bili: Negative / Urobili: Negative mg/dL   Blood: x / Protein: 30 mg/dL / Nitrite: Negative   Leuk Esterase: Small / RBC: Negative /HPF / WBC 0-2   Sq Epi: x / Non Sq Epi: Occasional / Bacteria: x        CAPILLARY BLOOD GLUCOSE      POCT Blood Glucose.: 233 mg/dL (21 May 2021 07:38)  POCT Blood Glucose.: 209 mg/dL (21 May 2021 02:56)  POCT Blood Glucose.: 143 mg/dL (20 May 2021 22:17)  POCT Blood Glucose.: 136 mg/dL (20 May 2021 21:04)  POCT Blood Glucose.: 91 mg/dL (20 May 2021 16:55)  POCT Blood Glucose.: 97 mg/dL (20 May 2021 11:13)        RADIOLOGY & ADDITIONAL TESTS:  Results Reviewed:   Imaging Personally Reviewed:  Electrocardiogram Personally Reviewed:                                          
                                                              [facility], [facility]                                                    [address], [address], [place] [address]                                                         PHONE: [phone]    FAX: [phone] [address], [address], [place] [address]                                                 PHONE: [phone]    FAX: [phone]  *******************************************************************************    Overnight events/Subjective Assessment:  No new cardiovascular issues overnight.  No chest pain, SOB or palpitations.  Patient complains of headache & ear pain bilaterally.    No Known Allergies    MEDICATIONS  (STANDING):  aspirin enteric coated 81 milliGRAM(s) Oral daily  atorvastatin 80 milliGRAM(s) Oral at bedtime  carvedilol 6.25 milliGRAM(s) Oral every 12 hours  cloNIDine 0.2 milliGRAM(s) Oral every 8 hours  clopidogrel Tablet 75 milliGRAM(s) Oral daily  cyanocobalamin 1000 MICROGram(s) Oral daily  dextrose 40% Gel 15 Gram(s) Oral once  dextrose 5%. 1000 milliLiter(s) (50 mL/Hr) IV Continuous <Continuous>  dextrose 5%. 1000 milliLiter(s) (100 mL/Hr) IV Continuous <Continuous>  dextrose 50% Injectable 25 Gram(s) IV Push once  dextrose 50% Injectable 12.5 Gram(s) IV Push once  dextrose 50% Injectable 25 Gram(s) IV Push once  ferrous    sulfate 325 milliGRAM(s) Oral daily  furosemide    Tablet 40 milliGRAM(s) Oral two times a day  gabapentin 200 milliGRAM(s) Oral three times a day  glucagon  Injectable 1 milliGRAM(s) IntraMuscular once  heparin   Injectable 5000 Unit(s) SubCutaneous every 12 hours  insulin glargine Injectable (LANTUS) 15 Unit(s) SubCutaneous at bedtime  insulin lispro (ADMELOG) corrective regimen sliding scale   SubCutaneous three times a day before meals  insulin lispro (ADMELOG) corrective regimen sliding scale   SubCutaneous at bedtime  loratadine 10 milliGRAM(s) Oral daily  memantine 5 milliGRAM(s) Oral daily  pyridoxine 100 milliGRAM(s) Oral daily    MEDICATIONS  (PRN):  acetaminophen   Tablet .. 650 milliGRAM(s) Oral every 6 hours PRN Moderate Pain (4 - 6)      Vital Signs Last 24 Hrs  T(C): 36.8 (23 May 2021 04:49), Max: 36.8 (23 May 2021 04:49)  T(F): 98.2 (23 May 2021 04:49), Max: 98.2 (23 May 2021 04:49)  HR: 63 (23 May 2021 10:02) (51 - 64)  BP: 180/68 (23 May 2021 10:02) (127/34 - 180/68)  BP(mean): 58 (22 May 2021 12:01) (58 - 58)  RR: 18 (23 May 2021 04:49) (18 - 18)  SpO2: 96% (23 May 2021 04:49) (96% - 98%)    I&O's Detail    22 May 2021 07:01  -  23 May 2021 07:00  --------------------------------------------------------  IN:    Oral Fluid: 80 mL  Total IN: 80 mL    OUT:  Total OUT: 0 mL    Total NET: 80 mL        I&O's Summary    22 May 2021 07:01  -  23 May 2021 07:00  --------------------------------------------------------  IN: 80 mL / OUT: 0 mL / NET: 80 mL            PHYSICAL EXAM:  General: Appears well developed, well nourished, no acute distress  HEENT: Head: normocephalic, atraumatic  Eyes: Pupils equal and reactive  Neck: Supple, no carotid bruit, no JVD, no HJR  CARDIOVASCULAR: Normal S1, II/VI syst M > base  LUNGS: Clear to auscultation bilaterally, no rales, rhonchi or wheeze  CHEST: symmetric chest wall expansion  ABDOMEN: Soft, nontender, non-distended, positive bowel sounds, no mass or bruit  EXTREMITIES: No edema, distal pulses WNL  SKIN: Warm and dry with normal turgor  NEURO: Alert & oriented x 3, grossly intact  PSYCH: normal mood and affect        LABS:                        12.4   6.88  )-----------( 231      ( 23 May 2021 06:18 )             39.2     05-23    138  |  99  |  57.0<H>  ----------------------------<  154<H>  3.1<L>   |  27.0  |  2.57<H>    Ca    8.6      23 May 2021 06:18  Mg     2.4     05-22    TPro  6.1<L>  /  Alb  3.3  /  TBili  0.2<L>  /  DBili  x   /  AST  11  /  ALT  11  /  AlkPhos  64  05-22          serum  Lipids:         RADIOLOGY & ADDITIONAL STUDIES:    TELEMETRY PERSONALLY REVIEWED: sinus 50-60s, no events    < from: CT Head No Cont (05.20.21 @ 22:56) >  IMPRESSION:   No intracranial bleed, extra-axial fluid collection, mass effect, midline shift or acute territorial infarct evident.    Multifocal chronic infarcts and periventricular white matter small vessel ischemic disease again seen.    Previously demonstrated tiny acute lacunar infarcts of the right occipital lobe and right paramedian frontal lobe are suboptimally demonstrated on this exam.    < end of copied text >    < from: US Duplex Carotid Arteries Complete, Bilateral (05.20.21 @ 16:48) >  IMPRESSION: No significant hemodynamic stenosis of either carotid artery.    < end of copied text >    < from: MR Head No Cont (05.20.21 @ 10:32) >  IMPRESSION: Acute small right occipital lobe cortical infarct. Acute punctate right paramedian frontal lobe infarct.    < end of copied text >    < from: CT Abdomen and Pelvis w/ Oral Cont (05.20.21 @ 03:01) >  IMPRESSION:  No acute intra-abdominal pathology on CT.    < end of copied text >    < from: Xray Chest 1 View-PORTABLE IMMEDIATE (05.20.21 @ 00:40) >  IMPRESSION: No acute cardiopulmonary disease process. Cardiomegaly.    < end of copied text >      ECHO: < from: TTE Echo Complete w/o Contrast w/ Doppler (05.20.21 @ 09:18) >  Left Ventricle: The left ventricular internal cavity size is normal. Left ventricular wall thickness is moderately increased.  Global LV systolic function was hyperdynamic. Left ventricular ejection fraction, by visual estimation, is 70 to 75%. Spectral Doppler shows impaired relaxation pattern of left ventricular myocardial filling (Grade I diastolic dysfunction).  Right Ventricle: The right ventricular size is normal. RV systolic function is normal.  Left Atrium: Mildly enlarged left atrium.  Right Atrium: Normal right atrial size.  Pericardium: There is no evidence of pericardial effusion.  Mitral Valve: The mitral valve is normal in structure. Trace mitral valve regurgitation is seen.  Tricuspid Valve: The tricuspid valve is normal in structure. Trivial tricuspid regurgitation is visualized.  Aortic Valve: Peak transaortic gradient equals 45.5 mmHg, mean transaortic gradient equals 28.4 mmHg, the calculated aortic valve area equals 0.75 cm² by the continuity equation consistent with severe aortic stenosis. The peak aortic velocity was obtained from the right parasternal view. Mild aortic valve regurgitation is seen.  Pulmonic Valve: Structurally normal pulmonic valve, with normal leaflet excursion. Trace pulmonic valve regurgitation.  Aorta: The aortic root is normal in size and structure.  Pulmonary Artery: The main pulmonary artery is normal in size.    < end of copied text >        ASSESSMENT AND PLAN:  In summary, PAULINO LAM is a 85F a/w slurred speech & poor balance now resolved, + acute R occipital lobe & R paramedian frontal lobe infarcts with multifocal chronic infarcts on MRI brain 5/20/21, h/o normal LV fxn, severe AS (calculated PILO 0.75 cm2), chronic diastolic CHF, PVD s/p LE stent, HTN, HL, CRI  - Stable cardiovascular status, no evidence of ischemia or CHF clinically.  Troponin negative, no chest pain or acute ischemic EKG changes.  Nuclear stress test 2020 demonstrated no evidence of stress-induced ischemia.  Continue medical management.  - Echocardiogram 5/20/21 demonstrated normal LV fxn (EF 70-75%), grade I diastolic dysfxn, severe AS (mean gradient 28.4 mmHg, calculated PILO 0.75 cm2), mild AR, trace AK, trace AK, trace TR  - Outpatient follow-up at [facility] for further assessment of treatment options for severe AS and consideration of TAVR once recovered from acute stroke  - Carotid duplex scan 5/20/21 demonstrated no significant hemodynamic stenosis of either carotid artery  - MRI head 5/20/21 demonstrated acute small right occipital lobe cortical infarct and acute punctate right paramedian frontal lobe infarct. Chronic bilateral frontal lobe, left temporal lobe, right occipital lobe cortical infarctions. Chronic bilateral cerebral hemisphere infarcts.  - CHF.  Patient appears relatively clinically euvolemic with no evidence of CHF clinically.  Lasix 40 mg PO bid in place.  Monitor strict I/Os, daily weights & BUN/Cr closely and titrate PRN.  Replete K>4 & Mg>2 (per medicine).  - HTN.  Rhythm stable sinus with no significant bradycardia and improvement in HR now 50-60s, BP now increased and patient with headache.  Continue Coreg to 6.25 bid & Clonidine 0.2 tid and add Hydralazine 25 bid for now (w/ hold parameters) and titrate PRN.  - Outpatient prolonged Zio patch external heart monitor vs ILR (to r/o PAF given h/o CVA in multiple vascular territories concerning for embolic events)  - CVA/PVD.  ASA 81 & Plavix 75 daily in place  - HL.  Lipitor 80 daily in place (lipids , LDL 69, HDL 39, Trig 267) -> re-check lipids as an outpatient  - DM.  Glycemic control per medicine (HbA1c 11.3)  - CRI.  Nephrology follow-up    Júnior Joseph MD  
PAULINO LAM    [id number]    85y      Female    INTERVAL HPI/OVERNIGHT EVENTS: patient being seen for cva and dm2. patient seen at bedside and states feeling well    REVIEW OF SYSTEMS:    CONSTITUTIONAL: No fever, weight loss, or fatigue  RESPIRATORY: No cough, wheezing, hemoptysis; No shortness of breath  CARDIOVASCULAR: No chest pain, palpitations  GASTROINTESTINAL: No abdominal or epigastric pain. No nausea, vomiting  NEUROLOGICAL: No headaches, memory loss, loss of strength.  MISCELLANEOUS:      Vital Signs Last 24 Hrs  T(C): 37.2 (25 May 2021 04:03), Max: 37.3 (25 May 2021 00:07)  T(F): 99 (25 May 2021 04:03), Max: 99.1 (25 May 2021 00:07)  HR: 55 (25 May 2021 05:15) (55 - 70)  BP: 115/63 (25 May 2021 05:15) (107/51 - 143/71)  BP(mean): 74 (25 May 2021 05:15) (7 - 95)  RR: 16 (25 May 2021 05:15) (16 - 18)  SpO2: 97% (25 May 2021 05:15) (95% - 99%)    PHYSICAL EXAM:    GENERAL: NAD, well-groomed  HEENT: PERRL, +EOMI, anicteric, no [place]  NECK: Supple, No JVD   CHEST/LUNG: CTA bilaterally; Normal effort  HEART: S1S2 Normal intensity, no murmurs, gallops or rubs noted  ABDOMEN: Soft, BS Normoactive, NT, ND, no HSM noted  EXTREMITIES:  2+ radial and DP pulses noted, no clubbing, cyanosis, or edema noted, FROM x 4  SKIN: No rashes or lesions noted  NEURO: A&Ox3, no focal deficits noted, dysphonia   PSYCH: normal mood and affect; insight/judgement appropriate    LABS:    05-24    134<L>  |  94<L>  |  60.0<H>  ----------------------------<  434<H>  3.6   |  27.0  |  2.88<H>    Ca    8.3<L>      24 May 2021 12:34              MEDICATIONS  (STANDING):  aspirin enteric coated 81 milliGRAM(s) Oral daily  atorvastatin 80 milliGRAM(s) Oral at bedtime  carvedilol 6.25 milliGRAM(s) Oral every 12 hours  cloNIDine 0.2 milliGRAM(s) Oral every 8 hours  clopidogrel Tablet 75 milliGRAM(s) Oral daily  cyanocobalamin 1000 MICROGram(s) Oral daily  dextrose 40% Gel 15 Gram(s) Oral once  dextrose 5%. 1000 milliLiter(s) (50 mL/Hr) IV Continuous <Continuous>  dextrose 5%. 1000 milliLiter(s) (100 mL/Hr) IV Continuous <Continuous>  dextrose 50% Injectable 25 Gram(s) IV Push once  dextrose 50% Injectable 12.5 Gram(s) IV Push once  dextrose 50% Injectable 25 Gram(s) IV Push once  ferrous    sulfate 325 milliGRAM(s) Oral daily  furosemide    Tablet 40 milliGRAM(s) Oral two times a day  gabapentin 200 milliGRAM(s) Oral three times a day  glucagon  Injectable 1 milliGRAM(s) IntraMuscular once  heparin   Injectable 5000 Unit(s) SubCutaneous every 12 hours  hydrALAZINE 50 milliGRAM(s) Oral three times a day  insulin glargine Injectable (LANTUS) 30 Unit(s) SubCutaneous at bedtime  insulin lispro (ADMELOG) corrective regimen sliding scale   SubCutaneous three times a day before meals  insulin lispro (ADMELOG) corrective regimen sliding scale   SubCutaneous at bedtime  insulin lispro Injectable (ADMELOG) 7 Unit(s) SubCutaneous three times a day before meals  loratadine 10 milliGRAM(s) Oral daily  memantine 5 milliGRAM(s) Oral daily  pyridoxine 100 milliGRAM(s) Oral daily    MEDICATIONS  (PRN):  acetaminophen   Tablet .. 650 milliGRAM(s) Oral every 6 hours PRN Moderate Pain (4 - 6)  morphine  - Injectable 2 milliGRAM(s) IV Push every 6 hours PRN Severe Pain (7 - 10)      RADIOLOGY & ADDITIONAL TESTS:  
                                                              [facility], [facility]                                                    [address], [address], [place] [address]                                                         PHONE: [phone]    FAX: [phone] [address], [address], [place] [address]                                                 PHONE: [phone]    FAX: [phone]  *******************************************************************************    Overnight events/Subjective Assessment:  No new cardiovascular issues overnight.  No chest pain, SOB or palpitations.    No Known Allergies    MEDICATIONS  (STANDING):  aspirin enteric coated 81 milliGRAM(s) Oral daily  atorvastatin 80 milliGRAM(s) Oral at bedtime  carvedilol 12.5 milliGRAM(s) Oral every 12 hours  cloNIDine 0.2 milliGRAM(s) Oral every 8 hours  clopidogrel Tablet 75 milliGRAM(s) Oral daily  cyanocobalamin 1000 MICROGram(s) Oral daily  dextrose 40% Gel 15 Gram(s) Oral once  dextrose 5%. 1000 milliLiter(s) (50 mL/Hr) IV Continuous <Continuous>  dextrose 5%. 1000 milliLiter(s) (100 mL/Hr) IV Continuous <Continuous>  dextrose 50% Injectable 25 Gram(s) IV Push once  dextrose 50% Injectable 12.5 Gram(s) IV Push once  dextrose 50% Injectable 25 Gram(s) IV Push once  ferrous    sulfate 325 milliGRAM(s) Oral daily  furosemide    Tablet 40 milliGRAM(s) Oral two times a day  gabapentin 200 milliGRAM(s) Oral three times a day  glucagon  Injectable 1 milliGRAM(s) IntraMuscular once  heparin   Injectable 5000 Unit(s) SubCutaneous every 12 hours  insulin glargine Injectable (LANTUS) 15 Unit(s) SubCutaneous at bedtime  insulin lispro (ADMELOG) corrective regimen sliding scale   SubCutaneous three times a day before meals  insulin lispro (ADMELOG) corrective regimen sliding scale   SubCutaneous at bedtime  loratadine 10 milliGRAM(s) Oral daily  memantine 5 milliGRAM(s) Oral daily  potassium chloride   Powder 40 milliEquivalent(s) Oral once  pyridoxine 100 milliGRAM(s) Oral daily    MEDICATIONS  (PRN):      Vital Signs Last 24 Hrs  T(C): 36.7 (22 May 2021 07:39), Max: 37.1 (21 May 2021 20:00)  T(F): 98.1 (22 May 2021 07:39), Max: 98.8 (21 May 2021 20:00)  HR: 45 (22 May 2021 07:39) (45 - 65)  BP: 113/39 (22 May 2021 07:39) (113/39 - 176/54)  BP(mean): 57 (22 May 2021 07:39) (57 - 84)  RR: 18 (22 May 2021 07:39) (16 - 18)  SpO2: 97% (22 May 2021 07:39) (94% - 97%)    I&O's Detail    21 May 2021 07:01  -  22 May 2021 07:00  --------------------------------------------------------  IN:    Oral Fluid: 240 mL  Total IN: 240 mL    OUT:    Voided (mL): 500 mL  Total OUT: 500 mL    Total NET: -260 mL        I&O's Summary    21 May 2021 07:01  -  22 May 2021 07:00  --------------------------------------------------------  IN: 240 mL / OUT: 500 mL / NET: -260 mL            PHYSICAL EXAM:  General: Appears well developed, well nourished, no acute distress  HEENT: Head: normocephalic, atraumatic  Eyes: Pupils equal and reactive  Neck: Supple, no carotid bruit, no JVD, no HJR  CARDIOVASCULAR: Normal S1, II/VI syst M > base  LUNGS: Clear to auscultation bilaterally, no rales, rhonchi or wheeze  CHEST: symmetric chest wall expansion  ABDOMEN: Soft, nontender, non-distended, positive bowel sounds, no mass or bruit  EXTREMITIES: No edema, distal pulses WNL  SKIN: Warm and dry with normal turgor  NEURO: Alert & oriented x 3, grossly intact  PSYCH: normal mood and affect        LABS:                        11.9   6.86  )-----------( 218      ( 22 May 2021 06:32 )             38.1     05-22    139  |  101  |  55.0<H>  ----------------------------<  104<H>  3.2<L>   |  25.0  |  2.74<H>    Ca    8.5<L>      22 May 2021 06:32  Mg     2.4     05-22    TPro  6.1<L>  /  Alb  3.3  /  TBili  0.2<L>  /  DBili  x   /  AST  11  /  ALT  11  /  AlkPhos  64  05-22          serum  Lipids:         RADIOLOGY & ADDITIONAL STUDIES:    TELEMETRY PERSONALLY REVIEWED: sinus bradycardia 40-50s, no events    < from: CT Head No Cont (05.20.21 @ 22:56) >  IMPRESSION:   No intracranial bleed, extra-axial fluid collection, mass effect, midline shift or acute territorial infarct evident.    Multifocal chronic infarcts and periventricular white matter small vessel ischemic disease again seen.    Previously demonstrated tiny acute lacunar infarcts of the right occipital lobe and right paramedian frontal lobe are suboptimally demonstrated on this exam.    < end of copied text >    < from: US Duplex Carotid Arteries Complete, Bilateral (05.20.21 @ 16:48) >  IMPRESSION: No significant hemodynamic stenosis of either carotid artery.    < end of copied text >    < from: MR Head No Cont (05.20.21 @ 10:32) >  IMPRESSION: Acute small right occipital lobe cortical infarct. Acute punctate right paramedian frontal lobe infarct.    < end of copied text >    < from: CT Abdomen and Pelvis w/ Oral Cont (05.20.21 @ 03:01) >  IMPRESSION:  No acute intra-abdominal pathology on CT.    < end of copied text >    < from: Xray Chest 1 View-PORTABLE IMMEDIATE (05.20.21 @ 00:40) >  IMPRESSION: No acute cardiopulmonary disease process. Cardiomegaly.    < end of copied text >      ECHO: < from: TTE Echo Complete w/o Contrast w/ Doppler (05.20.21 @ 09:18) >  Left Ventricle: The left ventricular internal cavity size is normal. Left ventricular wall thickness is moderately increased.  Global LV systolic function was hyperdynamic. Left ventricular ejection fraction, by visual estimation, is 70 to 75%. Spectral Doppler shows impaired relaxation pattern of left ventricular myocardial filling (Grade I diastolic dysfunction).  Right Ventricle: The right ventricular size is normal. RV systolic function is normal.  Left Atrium: Mildly enlarged left atrium.  Right Atrium: Normal right atrial size.  Pericardium: There is no evidence of pericardial effusion.  Mitral Valve: The mitral valve is normal in structure. Trace mitral valve regurgitation is seen.  Tricuspid Valve: The tricuspid valve is normal in structure. Trivial tricuspid regurgitation is visualized.  Aortic Valve: Peak transaortic gradient equals 45.5 mmHg, mean transaortic gradient equals 28.4 mmHg, the calculated aortic valve area equals 0.75 cm² by the continuity equation consistent with severe aortic stenosis. The peak aortic velocity was obtained from the right parasternal view. Mild aortic valve regurgitation is seen.  Pulmonic Valve: Structurally normal pulmonic valve, with normal leaflet excursion. Trace pulmonic valve regurgitation.  Aorta: The aortic root is normal in size and structure.  Pulmonary Artery: The main pulmonary artery is normal in size.    < end of copied text >        ASSESSMENT AND PLAN:  In summary, PAULINO LAM is a 85F a/w slurred speech & poor balance now resolved, + acute R occipital lobe & R paramedian frontal lobe infarcts with multifocal chronic infarcts on MRI brain 5/20/21, h/o normal LV fxn, severe AS (calculated PILO 0.75 cm2), chronic diastolic CHF, PVD s/p LE stent, HTN, HL, CRI  - Stable cardiovascular status, no evidence of ischemia or CHF clinically.  Troponin negative, no chest pain or acute ischemic EKG changes.  Nuclear stress teset 2020 demonstrated no evidence of stress-induced ischemia.  Continue medical management.  - Echocardiogram 5/20/21 demonstrated normal LV fxn (EF 70-75%), grade I diastolic dysfxn, severe AS (mean gradient 28.4 mmHg, calculated PILO 0.75 cm2), mild AR, trace MS, trace MS, trace TR  - Outpatient follow[facility] at [facility] for further assessment of treatment options for severe AS and consideration of TAVR once recovered from acute stroke  - Carotid duplex scan 5/20/21 demonstrated no significant hemodynamic stenosis of either carotid artery  - MRI head 5/20/21 demonstrated acute small right occipital lobe cortical infarct and acute punctate right paramedian frontal lobe infarct. Chronic bilateral frontal lobe, left temporal lobe, right occipital lobe cortical infarctions. Chronic bilateral cerebral hemisphere infarcts.  - CHF.  Patient appears relatively clinically euvolemic with no evidence of CHF clinically.  Lasix 40 mg PO bid in place.  Monitor strict I/Os, daily weights & BUN/Cr closely and titrate PRN.  Replete K>4 & Mg>2 (per medicine).  - HTN.  Rhythm stable sinus bradycardia, BP stable.  Decrease Coreg to 6.25 bid & continue current dose of Clonidine 0.8 tid for now (w/ hold parameters) and titrate PRN.  - Outpatient prolonged Zio patch external heart monitor vs ILR (to r/o PAF given h/o CVA in multiple vascular territories concerning for embolic events)  - CVA/PVD.  ASA 81 & Plavix 75 daily in place  - HL.  Lipitor 80 daily in place (lipids , LDL 69, HDL 39, Trig 267) -> re-check lipids as an outpatient  - DM.  Glycemic control per medicine (HbA1c 11.3)  - CRI.  Nephrology follow-up    Júnior Joseph MD
                                                    [facility], [facility]                                          [address], [address], [place] [address]                                               PHONE: [phone]    FAX: [phone] [address], [address], [place] [address]                                       PHONE: [phone]    FAX: [phone]  *******************************************************************************    Overnight events/Subjective Assessment:  No overnight cardiac events.  No CP, palpitations, SOB.    INTERPRETATION OF TELEMETRY (personally reviewed):  SR 50-60s, no events    No Known Allergies    MEDICATIONS  (STANDING):  aspirin enteric coated 81 milliGRAM(s) Oral daily  atorvastatin 80 milliGRAM(s) Oral at bedtime  carvedilol 6.25 milliGRAM(s) Oral every 12 hours  cloNIDine 0.2 milliGRAM(s) Oral every 8 hours  clopidogrel Tablet 75 milliGRAM(s) Oral daily  cyanocobalamin 1000 MICROGram(s) Oral daily  dextrose 40% Gel 15 Gram(s) Oral once  dextrose 5%. 1000 milliLiter(s) (50 mL/Hr) IV Continuous <Continuous>  dextrose 5%. 1000 milliLiter(s) (100 mL/Hr) IV Continuous <Continuous>  dextrose 50% Injectable 25 Gram(s) IV Push once  dextrose 50% Injectable 12.5 Gram(s) IV Push once  dextrose 50% Injectable 25 Gram(s) IV Push once  enalaprilat Injectable 5 milliGRAM(s) IV Push every 8 hours  ferrous    sulfate 325 milliGRAM(s) Oral daily  furosemide    Tablet 40 milliGRAM(s) Oral two times a day  gabapentin 200 milliGRAM(s) Oral three times a day  glucagon  Injectable 1 milliGRAM(s) IntraMuscular once  heparin   Injectable 5000 Unit(s) SubCutaneous every 12 hours  hydrALAZINE 50 milliGRAM(s) Oral three times a day  insulin glargine Injectable (LANTUS) 15 Unit(s) SubCutaneous at bedtime  insulin lispro (ADMELOG) corrective regimen sliding scale   SubCutaneous three times a day before meals  insulin lispro (ADMELOG) corrective regimen sliding scale   SubCutaneous at bedtime  loratadine 10 milliGRAM(s) Oral daily  memantine 5 milliGRAM(s) Oral daily  pyridoxine 100 milliGRAM(s) Oral daily    MEDICATIONS  (PRN):  acetaminophen   Tablet .. 650 milliGRAM(s) Oral every 6 hours PRN Moderate Pain (4 - 6)  morphine  - Injectable 2 milliGRAM(s) IV Push every 6 hours PRN Severe Pain (7 - 10)      Vital Signs Last 24 Hrs  T(C): 36.3 (24 May 2021 08:00), Max: 36.6 (23 May 2021 10:02)  T(F): 97.4 (24 May 2021 08:00), Max: 97.8 (23 May 2021 10:02)  HR: 54 (24 May 2021 08:00) (49 - 72)  BP: 143/71 (24 May 2021 08:00) (92/48 - 205/73)  BP(mean): 95 (24 May 2021 08:00) (63 - 95)  RR: 18 (24 May 2021 08:00) (16 - 18)  SpO2: 96% (24 May 2021 08:00) (95% - 97%)    I&O's Detail    23 May 2021 07:01  -  24 May 2021 07:00  --------------------------------------------------------  IN:    Oral Fluid: 300 mL  Total IN: 300 mL    OUT:    Voided (mL): 50 mL  Total OUT: 50 mL    Total NET: 250 mL        I&O's Summary    23 May 2021 07:01  -  24 May 2021 07:00  --------------------------------------------------------  IN: 300 mL / OUT: 50 mL / NET: 250 mL            PHYSICAL EXAM:  General: Appears well developed, well nourished, no acute distress  HEENT: Head: normocephalic, atraumatic  Eyes: Pupils equal and reactive  Neck: Supple, no carotid bruit, no JVD, no HJR  CARDIOVASCULAR: Normal S1, 2-3/6 TOSHIA  LUNGS: Clear to auscultation bilaterally, no rales, rhonchi or wheeze  CHEST: symmetric chest wall expansion  ABDOMEN: Soft, nontender, non-distended, positive bowel sounds, no mass or bruit  EXTREMITIES: No edema, distal pulses WNL  SKIN: Warm and dry with normal turgor  NEURO: Alert & oriented x 3, grossly intact  PSYCH: normal mood and affect          LABS:                        12.4   6.88  )-----------( 231      ( 23 May 2021 06:18 )             39.2     05-23    138  |  99  |  57.0<H>  ----------------------------<  154<H>  3.1<L>   |  27.0  |  2.57<H>    Ca    8.6      23 May 2021 06:18            serum  Lipids:         < from: CT Head No Cont (05.20.21 @ 22:56) >  IMPRESSION:   No intracranial bleed, extra-axial fluid collection, mass effect, midline shift or acute territorial infarct evident.    Multifocal chronic infarcts and periventricular white matter small vessel ischemic disease again seen.    Previously demonstrated tiny acute lacunar infarcts of the right occipital lobe and right paramedian frontal lobe are suboptimally demonstrated on this exam.    < end of copied text >    < from: US Duplex Carotid Arteries Complete, Bilateral (05.20.21 @ 16:48) >  IMPRESSION: No significant hemodynamic stenosis of either carotid artery.    < end of copied text >    < from: MR Head No Cont (05.20.21 @ 10:32) >  IMPRESSION: Acute small right occipital lobe cortical infarct. Acute punctate right paramedian frontal lobe infarct.    < end of copied text >    < from: CT Abdomen and Pelvis w/ Oral Cont (05.20.21 @ 03:01) >  IMPRESSION:  No acute intra-abdominal pathology on CT.    < end of copied text >    < from: Xray Chest 1 View-PORTABLE IMMEDIATE (05.20.21 @ 00:40) >  IMPRESSION: No acute cardiopulmonary disease process. Cardiomegaly.    < end of copied text >      ECHO: < from: TTE Echo Complete w/o Contrast w/ Doppler (05.20.21 @ 09:18) >  Left Ventricle: The left ventricular internal cavity size is normal. Left ventricular wall thickness is moderately increased.  Global LV systolic function was hyperdynamic. Left ventricular ejection fraction, by visual estimation, is 70 to 75%. Spectral Doppler shows impaired relaxation pattern of left ventricular myocardial filling (Grade I diastolic dysfunction).  Right Ventricle: The right ventricular size is normal. RV systolic function is normal.  Left Atrium: Mildly enlarged left atrium.  Right Atrium: Normal right atrial size.  Pericardium: There is no evidence of pericardial effusion.  Mitral Valve: The mitral valve is normal in structure. Trace mitral valve regurgitation is seen.  Tricuspid Valve: The tricuspid valve is normal in structure. Trivial tricuspid regurgitation is visualized.  Aortic Valve: Peak transaortic gradient equals 45.5 mmHg, mean transaortic gradient equals 28.4 mmHg, the calculated aortic valve area equals 0.75 cm² by the continuity equation consistent with severe aortic stenosis. The peak aortic velocity was obtained from the right parasternal view. Mild aortic valve regurgitation is seen.  Pulmonic Valve: Structurally normal pulmonic valve, with normal leaflet excursion. Trace pulmonic valve regurgitation.  Aorta: The aortic root is normal in size and structure.  Pulmonary Artery: The main pulmonary artery is normal in size.    < end of copied text >        ASSESSMENT AND PLAN:  In summary, PAULINO LAM is a 85F a/w slurred speech & poor balance now resolved, + acute R occipital lobe & R paramedian frontal lobe infarcts with multifocal chronic infarcts on MRI brain 5/20/21, h/o normal LV fxn, severe AS (calculated PILO 0.75 cm2), chronic diastolic CHF, PVD s/p LE stent, HTN, HL, CRI    - Stable cardiovascular status, no evidence of ischemia or CHF clinically.  Troponin negative, no chest pain or acute ischemic EKG changes.  Nuclear stress test 2020 demonstrated no evidence of stress-induced ischemia.  Continue medical management.  - Echocardiogram 5/20/21 demonstrated normal LV fxn (EF 70-75%), grade I diastolic dysfxn, severe AS (mean gradient 28.4 mmHg, calculated PILO 0.75 cm2), mild AR, trace DC, trace DC, trace TR  - Outpatient follow-up at [facility] for further assessment of treatment options for severe AS and consideration of TAVR once recovered from acute stroke  - Carotid duplex scan 5/20/21 demonstrated no significant hemodynamic stenosis of either carotid artery  - MRI head 5/20/21 demonstrated acute small right occipital lobe cortical infarct and acute punctate right paramedian frontal lobe infarct. Chronic bilateral frontal lobe, left temporal lobe, right occipital lobe cortical infarctions. Chronic bilateral cerebral hemisphere infarcts.  - Chronic diastolic CHF.  Patient appears relatively clinically euvolemic with no evidence of CHF clinically.  Lasix 40 mg PO bid in place.  Monitor strict I/Os, daily weights & BUN/Cr closely and titrate PRN.  Replete K>4 & Mg>2 (per medicine).  - HTN.  Rhythm stable sinus with no significant bradycardia and improvement in HR now 50-60s, BP now increased and patient with headache.  Continue Coreg 6.25 bid & Clonidine 0.2 tid and Hydralazine 25 bid for now (w/ hold parameters) and titrate PRN.  - Outpatient prolonged Zio patch external heart monitor vs ILR (to r/o PAF given h/o CVA in multiple vascular territories concerning for embolic events)  - CVA/PVD.  ASA 81 & Plavix 75 daily in place  - HLD.  Lipitor 80 daily in place (lipids , LDL 69, HDL 39, Trig 267) -> re-check lipids as an outpatient  - DM.  Glycemic control per medicine (HbA1c 11.3)  - CRI.  Nephrology follow-up  - Stable CV status.  No further inpatient cardiac work up planned.  Outpatient follow up in our office after discharge. Please call with any CV questions or concerns.        Robin Petty MD 
CC: CVA small right occipital cva. No focal weakness . Headache.     HPI:  85F hx DM2, CAD, PAD s/p femoral PCI, mild dementia, HTN, HLD, CKD presents with worsening weakness and slurred speech. Daughter at bedside assisted with history. She states that her mothers glucose has been poorly controlled and recently decdied to start patient on levemir. She was started on 8 units qhs to increase to 10. 2 nights ago glucose found to be 420. Daughter gave insulin, but glucose continued to rise. She also noticed mom to be generally weak, slurring her words and having unsteady gait. These symptoms continued and glucose continued it to be elevated prompting her to bring her to ER.     In ER, CtH negative. Daughter states symptoms improving and mother near baseline. Pt complains of nausea and generalized weakness. Deneis focal weakness or numbness. Denies other complaints.  (20 May 2021 05:40)    REVIEW OF SYSTEMS:    Patient denied fever, chills, abdominal pain, nausea, vomiting, cough, shortness of breath, chest pain or palpitations    Vital Signs Last 24 Hrs  T(C): 36.6 (23 May 2021 10:02), Max: 36.8 (23 May 2021 04:49)  T(F): 97.8 (23 May 2021 10:02), Max: 98.2 (23 May 2021 04:49)  HR: 72 (23 May 2021 13:12) (51 - 72)  BP: 147/64 (23 May 2021 13:12) (135/60 - 180/68)  BP(mean): --  RR: 18 (23 May 2021 04:49) (18 - 18)  SpO2: 96% (23 May 2021 04:49) (96% - 98%)I&O's Summary    22 May 2021 07:01  -  23 May 2021 07:00  --------------------------------------------------------  IN: 80 mL / OUT: 0 mL / NET: 80 mL    23 May 2021 07:01  -  23 May 2021 13:49  --------------------------------------------------------  IN: 300 mL / OUT: 50 mL / NET: 250 mL      PHYSICAL EXAM:  GENERAL: NAD, well-groomed  HEENT: PERRL, +EOMI, anicteric, no [place]  NECK: Supple, No JVD   CHEST/LUNG: CTA bilaterally; Normal effort  HEART: S1S2 Normal intensity, no murmurs, gallops or rubs noted  ABDOMEN: Soft, BS Normoactive, NT, ND, no HSM noted  EXTREMITIES:  2+ radial and DP pulses noted, no clubbing, cyanosis, or edema noted, FROM x 4  SKIN: No rashes or lesions noted  NEURO: A&Ox3, no focal deficits noted, dysphonia   PSYCH: normal mood and affect; insight/judgement appropriate  LABS:                        12.4   6.88  )-----------( 231      ( 23 May 2021 06:18 )             39.2     05-23    138  |  99  |  57.0<H>  ----------------------------<  154<H>  3.1<L>   |  27.0  |  2.57<H>    Ca    8.6      23 May 2021 06:18  Mg     2.4     05-22    TPro  6.1<L>  /  Alb  3.3  /  TBili  0.2<L>  /  DBili  x   /  AST  11  /  ALT  11  /  AlkPhos  64  05-22        RADIOLOGY & ADDITIONAL TESTS:    MEDICATIONS:  MEDICATIONS  (STANDING):  aspirin enteric coated 81 milliGRAM(s) Oral daily  atorvastatin 80 milliGRAM(s) Oral at bedtime  carvedilol 6.25 milliGRAM(s) Oral every 12 hours  cloNIDine 0.2 milliGRAM(s) Oral every 8 hours  clopidogrel Tablet 75 milliGRAM(s) Oral daily  cyanocobalamin 1000 MICROGram(s) Oral daily  dextrose 40% Gel 15 Gram(s) Oral once  dextrose 5%. 1000 milliLiter(s) (50 mL/Hr) IV Continuous <Continuous>  dextrose 5%. 1000 milliLiter(s) (100 mL/Hr) IV Continuous <Continuous>  dextrose 50% Injectable 25 Gram(s) IV Push once  dextrose 50% Injectable 12.5 Gram(s) IV Push once  dextrose 50% Injectable 25 Gram(s) IV Push once  ferrous    sulfate 325 milliGRAM(s) Oral daily  furosemide    Tablet 40 milliGRAM(s) Oral two times a day  gabapentin 200 milliGRAM(s) Oral three times a day  glucagon  Injectable 1 milliGRAM(s) IntraMuscular once  heparin   Injectable 5000 Unit(s) SubCutaneous every 12 hours  hydrALAZINE 50 milliGRAM(s) Oral three times a day  insulin glargine Injectable (LANTUS) 15 Unit(s) SubCutaneous at bedtime  insulin lispro (ADMELOG) corrective regimen sliding scale   SubCutaneous three times a day before meals  insulin lispro (ADMELOG) corrective regimen sliding scale   SubCutaneous at bedtime  loratadine 10 milliGRAM(s) Oral daily  memantine 5 milliGRAM(s) Oral daily  pyridoxine 100 milliGRAM(s) Oral daily    MEDICATIONS  (PRN):  acetaminophen   Tablet .. 650 milliGRAM(s) Oral every 6 hours PRN Moderate Pain (4 - 6)  
CC: CVA, slurred speech  INTERVAL HPI/OVERNIGHT EVENTS:   Patient seen and examined with [place] phone , lying in bed, in NAD. Patient complains of pain in her neck when she turns her head to the right. Denies any chest pain, cob, n/v/d.      Vital Signs Last 24 Hrs  T(C): 37.1 (25 May 2021 08:00), Max: 37.3 (25 May 2021 00:07)  T(F): 98.8 (25 May 2021 08:00), Max: 99.1 (25 May 2021 00:07)  HR: 57 (25 May 2021 08:00) (55 - 70)  BP: 111/65 (25 May 2021 08:00) (107/51 - 143/71)  BP(mean): 80 (25 May 2021 08:00) (7 - 95)  RR: 16 (25 May 2021 08:00) (16 - 18)  SpO2: 99% (25 May 2021 08:00) (95% - 99%)    PHYSICAL EXAM:  GENERAL: Pt lying in bed comfortably in NAD  HEENT: PERRL, +EOMI, anicteric, no [place]  NECK: Supple, No JVD   CHEST/LUNG: CTA bilaterally; Normal effort  HEART: S1S2 Normal intensity, no murmurs, gallops or rubs noted  ABDOMEN: Soft, BS Normoactive, NT, ND, no HSM noted  EXTREMITIES:  2+ radial and DP pulses noted, no clubbing, cyanosis, or edema noted, FROM x 4  SKIN: No rashes or lesions noted  NEURO: A&Ox3, no focal deficits noted, dysphonia   PSYCH: normal mood and affect; insight/judgement appropriate    LABS:    05-25    137  |  97<L>  |  64.0<H>  ----------------------------<  188<H>  3.5   |  29.0  |  2.75<H>    Ca    8.9      25 May 2021 10:20    TPro  6.5<L>  /  Alb  3.6  /  TBili  0.2<L>  /  DBili  x   /  AST  11  /  ALT  11  /  AlkPhos  73  05-25      CAPILLARY BLOOD GLUCOSE      POCT Blood Glucose.: 228 mg/dL (25 May 2021 12:25)  POCT Blood Glucose.: 115 mg/dL (25 May 2021 08:03)  POCT Blood Glucose.: 184 mg/dL (24 May 2021 21:22)  POCT Blood Glucose.: 155 mg/dL (24 May 2021 16:43)    MEDICATIONS  (STANDING):  aspirin enteric coated 81 milliGRAM(s) Oral daily  atorvastatin 80 milliGRAM(s) Oral at bedtime  carvedilol 6.25 milliGRAM(s) Oral every 12 hours  cloNIDine 0.2 milliGRAM(s) Oral every 8 hours  clopidogrel Tablet 75 milliGRAM(s) Oral daily  cyanocobalamin 1000 MICROGram(s) Oral daily  dextrose 40% Gel 15 Gram(s) Oral once  dextrose 5%. 1000 milliLiter(s) (50 mL/Hr) IV Continuous <Continuous>  dextrose 5%. 1000 milliLiter(s) (100 mL/Hr) IV Continuous <Continuous>  dextrose 50% Injectable 25 Gram(s) IV Push once  dextrose 50% Injectable 12.5 Gram(s) IV Push once  dextrose 50% Injectable 25 Gram(s) IV Push once  ferrous    sulfate 325 milliGRAM(s) Oral daily  furosemide    Tablet 40 milliGRAM(s) Oral two times a day  gabapentin 200 milliGRAM(s) Oral three times a day  glucagon  Injectable 1 milliGRAM(s) IntraMuscular once  heparin   Injectable 5000 Unit(s) SubCutaneous every 12 hours  hydrALAZINE 50 milliGRAM(s) Oral three times a day  insulin glargine Injectable (LANTUS) 30 Unit(s) SubCutaneous at bedtime  insulin lispro (ADMELOG) corrective regimen sliding scale   SubCutaneous three times a day before meals  insulin lispro (ADMELOG) corrective regimen sliding scale   SubCutaneous at bedtime  insulin lispro Injectable (ADMELOG) 7 Unit(s) SubCutaneous three times a day before meals  loratadine 10 milliGRAM(s) Oral daily  memantine 5 milliGRAM(s) Oral daily  pyridoxine 100 milliGRAM(s) Oral daily    MEDICATIONS  (PRN):  acetaminophen   Tablet .. 650 milliGRAM(s) Oral every 6 hours PRN Moderate Pain (4 - 6)  morphine  - Injectable 2 milliGRAM(s) IV Push every 6 hours PRN Severe Pain (7 - 10)  
                             [facility]                                     Neurology at [facility]                                 [facility]                                  [address]                                        [place], [place], [address]                                             [phone]    CC: dysarthria  HPI:  The patient is a 85y Female who presented with slurred speech and unsteadiness in setting of hyperglycemia.  She denied weakness, numbness and language involvement.  She is now doing better.  She has mild dementia per report, but is fully alert and oriented.  Her stroke risk factors include HTN, HLD and DM.  Neurology is asked to evaluate.    Interval history: doing better, speech clear    Review of systems (neurology): Denies headache or dizziness. Denies weakness/numbness.  Denies speech/language deficits. Denies diplopia/blurred vision.  Denies confusion (+) foot pain    MEDICATIONS  (STANDING):  aspirin enteric coated 81 milliGRAM(s) Oral daily  atorvastatin 80 milliGRAM(s) Oral at bedtime  carvedilol 12.5 milliGRAM(s) Oral every 12 hours  cloNIDine 0.2 milliGRAM(s) Oral every 8 hours  clopidogrel Tablet 75 milliGRAM(s) Oral daily  cyanocobalamin 1000 MICROGram(s) Oral daily  dextrose 40% Gel 15 Gram(s) Oral once  dextrose 5%. 1000 milliLiter(s) (50 mL/Hr) IV Continuous <Continuous>  dextrose 5%. 1000 milliLiter(s) (100 mL/Hr) IV Continuous <Continuous>  dextrose 50% Injectable 25 Gram(s) IV Push once  dextrose 50% Injectable 12.5 Gram(s) IV Push once  dextrose 50% Injectable 25 Gram(s) IV Push once  ferrous    sulfate 325 milliGRAM(s) Oral daily  furosemide    Tablet 40 milliGRAM(s) Oral two times a day  gabapentin 200 milliGRAM(s) Oral three times a day  glucagon  Injectable 1 milliGRAM(s) IntraMuscular once  heparin   Injectable 5000 Unit(s) SubCutaneous every 12 hours  hydrALAZINE 100 milliGRAM(s) Oral every 8 hours  insulin glargine Injectable (LANTUS) 15 Unit(s) SubCutaneous at bedtime  insulin lispro (ADMELOG) corrective regimen sliding scale   SubCutaneous three times a day before meals  insulin lispro (ADMELOG) corrective regimen sliding scale   SubCutaneous at bedtime  loratadine 10 milliGRAM(s) Oral daily  memantine 5 milliGRAM(s) Oral daily  pyridoxine 100 milliGRAM(s) Oral daily    MEDICATIONS  (PRN):      Vital Signs Last 24 Hrs  T(C): 36.8 (21 May 2021 16:00), Max: 37.4 (21 May 2021 08:00)  T(F): 98.3 (21 May 2021 16:00), Max: 99.3 (21 May 2021 08:00)  HR: 65 (21 May 2021 16:00) (55 - 69)  BP: 155/35 (21 May 2021 16:00) (101/34 - 176/80)  BP(mean): 67 (21 May 2021 16:00) (51 - 84)  RR: 16 (21 May 2021 16:00) (12 - 19)  SpO2: 96% (21 May 2021 16:00) (94% - 98%)    Detailed Neurologic Exam:    Mental status: The patient is awake and alert and has normal attention span.  The patient is fully oriented in 3 spheres. The patient is oriented to current events. The patient is able to name objects, follow commands, repeat sentences.    Cranial nerves: Pupils irregular and non reactive at 6mm There is no visual field deficit to confrontation. Extraocular motion is full with no nystagmus. There is no ptosis. Facial sensation is intact. Facial musculature is symmetric. Palate elevates symmetrically. Tongue is midline.    Motor: There is normal bulk and tone.  There is no tremor.  Strength is 5/5 in the right arm and leg.   Strength is 5/5 in the left arm and leg.    Sensation: Intact to light touch and pin in 4 extremities    Reflexes: 1+ throughout and plantar responses are flexor.    Cerebellar: There is no dysmetria on finger to nose testing.    Gait : deferred    LABS:                           12.5   9.15  )-----------( 254      ( 21 May 2021 05:30 )             38.3     05-21    138  |  100  |  51.0<H>  ----------------------------<  223<H>  3.3<L>   |  26.0  |  2.21<H>    Ca    8.7      21 May 2021 05:30  Phos  3.0     05-20  Mg     2.2     05-21    TPro  6.3<L>  /  Alb  3.3  /  TBili  0.3<L>  /  DBili  x   /  AST  12  /  ALT  13  /  AlkPhos  74  05-21    LIVER FUNCTIONS - ( 21 May 2021 05:30 )  Alb: 3.3 g/dL / Pro: 6.3 g/dL / ALK PHOS: 74 U/L / ALT: 13 U/L / AST: 12 U/L / GGT: x             Lipid Profile (05.20.21 @ 08:57)    Cholesterol, Serum: 161 mg/dL    Triglycerides, Serum: 267 mg/dL    HDL Cholesterol, Serum: 39 mg/dL    Non HDL Cholesterol: 122:    LDL Cholesterol Calculated: 69 mg/dL        RADIOLOGY & ADDITIONAL STUDIES (independently reviewed unless otherwise noted):  MRI brain- small acute right occipital lobe stroke and punctate right paramedian frontal lobe CVA, no acute blood or mass    CT head did not show acute CVA, mass or blood    < from: TTE Echo Complete w/o Contrast w/ Doppler (05.20.21 @ 09:18) >    Summary:   1. Mildly enlarged left atrium.   2. There is moderate concentric left ventricular hypertrophy. 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   3. Hyperdynamic wall motion. Left ventricular ejection fraction, by visual estimation, is 70 to 75%. Grade I diastolic dysfunction.   4. Normal right atrial size.   5. Mild aortic regurgitation.   6. Paradoxical low-flow, low-gradient severe aortic stenosis.   7. There is no evidence of pericardial effusion.      Carotid duplex no sig stenosis

## 2021-05-26 PROBLEM — I73.9 PERIPHERAL VASCULAR DISEASE, UNSPECIFIED: Chronic | Status: ACTIVE | Noted: 2021-05-20

## 2021-05-27 ENCOUNTER — NON-APPOINTMENT (OUTPATIENT)
Age: 86
End: 2021-05-27

## 2021-05-27 LAB
ALBUMIN SERPL ELPH-MCNC: 4.1 G/DL
ANION GAP SERPL CALC-SCNC: 15 MMOL/L
BUN SERPL-MCNC: 47 MG/DL
CALCIUM SERPL-MCNC: 10.3 MG/DL
CHLORIDE SERPL-SCNC: 98 MMOL/L
CO2 SERPL-SCNC: 28 MMOL/L
CREAT SERPL-MCNC: 1.79 MG/DL
GLUCOSE SERPL-MCNC: 423 MG/DL
MAGNESIUM SERPL-MCNC: 2.4 MG/DL
PHOSPHATE SERPL-MCNC: 3.8 MG/DL
POTASSIUM SERPL-SCNC: 3.9 MMOL/L
SODIUM SERPL-SCNC: 141 MMOL/L

## 2021-06-01 ENCOUNTER — APPOINTMENT (OUTPATIENT)
Dept: FAMILY MEDICINE | Facility: CLINIC | Age: 86
End: 2021-06-01

## 2021-06-07 ENCOUNTER — NON-APPOINTMENT (OUTPATIENT)
Age: 86
End: 2021-06-07

## 2021-06-07 ENCOUNTER — RX RENEWAL (OUTPATIENT)
Age: 86
End: 2021-06-07

## 2021-06-07 RX ORDER — MEMANTINE HYDROCHLORIDE 5 MG/1
5 TABLET, FILM COATED ORAL DAILY
Qty: 90 | Refills: 2 | Status: ACTIVE | COMMUNITY
Start: 2019-08-27 | End: 1900-01-01

## 2021-06-08 ENCOUNTER — NON-APPOINTMENT (OUTPATIENT)
Age: 86
End: 2021-06-08

## 2021-06-10 ENCOUNTER — LABORATORY RESULT (OUTPATIENT)
Age: 86
End: 2021-06-10

## 2021-06-10 ENCOUNTER — APPOINTMENT (OUTPATIENT)
Dept: FAMILY MEDICINE | Facility: CLINIC | Age: 86
End: 2021-06-10
Payer: MEDICARE

## 2021-06-10 VITALS
RESPIRATION RATE: 16 BRPM | HEIGHT: 62 IN | WEIGHT: 145 LBS | SYSTOLIC BLOOD PRESSURE: 170 MMHG | DIASTOLIC BLOOD PRESSURE: 78 MMHG | HEART RATE: 87 BPM | TEMPERATURE: 97.8 F | BODY MASS INDEX: 26.68 KG/M2 | OXYGEN SATURATION: 96 %

## 2021-06-10 DIAGNOSIS — R41.3 OTHER AMNESIA: ICD-10-CM

## 2021-06-10 DIAGNOSIS — R01.1 CARDIAC MURMUR, UNSPECIFIED: ICD-10-CM

## 2021-06-10 DIAGNOSIS — Z79.899 OTHER LONG TERM (CURRENT) DRUG THERAPY: ICD-10-CM

## 2021-06-10 DIAGNOSIS — Z86.73 PERSONAL HISTORY OF TRANSIENT ISCHEMIC ATTACK (TIA), AND CEREBRAL INFARCTION W/OUT RESIDUAL DEFICITS: ICD-10-CM

## 2021-06-10 DIAGNOSIS — E78.5 HYPERLIPIDEMIA, UNSPECIFIED: ICD-10-CM

## 2021-06-10 PROCEDURE — 99495 TRANSJ CARE MGMT MOD F2F 14D: CPT | Mod: 25

## 2021-06-10 PROCEDURE — 99214 OFFICE O/P EST MOD 30 MIN: CPT

## 2021-06-10 RX ORDER — GLIPIZIDE 10 MG/1
10 TABLET ORAL
Qty: 90 | Refills: 1 | Status: DISCONTINUED | COMMUNITY
Start: 2017-08-04 | End: 2021-06-10

## 2021-06-11 LAB
ALBUMIN SERPL ELPH-MCNC: 3.9 G/DL
ANION GAP SERPL CALC-SCNC: 14 MMOL/L
BASOPHILS # BLD AUTO: 0.01 K/UL
BASOPHILS NFR BLD AUTO: 0.1 %
BUN SERPL-MCNC: 36 MG/DL
CALCIUM SERPL-MCNC: 9.6 MG/DL
CHLORIDE SERPL-SCNC: 100 MMOL/L
CO2 SERPL-SCNC: 22 MMOL/L
CREAT SERPL-MCNC: 1.6 MG/DL
EOSINOPHIL # BLD AUTO: 0.23 K/UL
EOSINOPHIL NFR BLD AUTO: 2.4 %
GLUCOSE SERPL-MCNC: 199 MG/DL
HCT VFR BLD CALC: 36.1 %
HGB BLD-MCNC: 11.3 G/DL
IMM GRANULOCYTES NFR BLD AUTO: 0.2 %
LYMPHOCYTES # BLD AUTO: 0.91 K/UL
LYMPHOCYTES NFR BLD AUTO: 9.7 %
MAN DIFF?: NORMAL
MCHC RBC-ENTMCNC: 28.8 PG
MCHC RBC-ENTMCNC: 31.3 GM/DL
MCV RBC AUTO: 91.9 FL
MONOCYTES # BLD AUTO: 0.64 K/UL
MONOCYTES NFR BLD AUTO: 6.8 %
NEUTROPHILS # BLD AUTO: 7.59 K/UL
NEUTROPHILS NFR BLD AUTO: 80.8 %
PHOSPHATE SERPL-MCNC: 3.8 MG/DL
PLATELET # BLD AUTO: 279 K/UL
POTASSIUM SERPL-SCNC: 4.6 MMOL/L
RBC # BLD: 3.93 M/UL
RBC # FLD: 13.1 %
SODIUM SERPL-SCNC: 136 MMOL/L
WBC # FLD AUTO: 9.4 K/UL

## 2021-06-15 ENCOUNTER — RX CHANGE (OUTPATIENT)
Age: 86
End: 2021-06-15

## 2021-06-15 ENCOUNTER — OUTPATIENT (OUTPATIENT)
Dept: OUTPATIENT SERVICES | Facility: HOSPITAL | Age: 86
LOS: 1 days | Discharge: HOME | End: 2021-06-15

## 2021-06-15 DIAGNOSIS — D63.1 ANEMIA IN CHRONIC KIDNEY DISEASE: ICD-10-CM

## 2021-06-15 DIAGNOSIS — Z86.69 PERSONAL HISTORY OF OTHER DISEASES OF THE NERVOUS SYSTEM AND SENSE ORGANS: Chronic | ICD-10-CM

## 2021-06-15 LAB — TSH SERPL-ACNC: 5.27 UIU/ML

## 2021-06-15 RX ORDER — PEN NEEDLE, DIABETIC 29 G X1/2"
31G X 5 MM NEEDLE, DISPOSABLE MISCELLANEOUS
Qty: 90 | Refills: 3 | Status: DISCONTINUED | COMMUNITY
Start: 2021-05-13 | End: 2021-06-15

## 2021-06-15 RX ORDER — INSULIN LISPRO 100 [IU]/ML
100 INJECTION, SOLUTION INTRAVENOUS; SUBCUTANEOUS
Qty: 1 | Refills: 5 | Status: DISCONTINUED | COMMUNITY
Start: 2021-06-10 | End: 2021-06-15

## 2021-06-16 ENCOUNTER — RESULT REVIEW (OUTPATIENT)
Age: 86
End: 2021-06-16

## 2021-06-16 ENCOUNTER — APPOINTMENT (OUTPATIENT)
Dept: HEMATOLOGY ONCOLOGY | Facility: CLINIC | Age: 86
End: 2021-06-16
Payer: MEDICARE

## 2021-06-16 VITALS
HEART RATE: 51 BPM | HEIGHT: 62 IN | SYSTOLIC BLOOD PRESSURE: 168 MMHG | WEIGHT: 141.19 LBS | OXYGEN SATURATION: 96 % | BODY MASS INDEX: 25.98 KG/M2 | DIASTOLIC BLOOD PRESSURE: 65 MMHG

## 2021-06-16 LAB
ALBUMIN SERPL ELPH-MCNC: 4.3 G/DL
ALP BLD-CCNC: 121 U/L
ALT SERPL-CCNC: 28 U/L
ANION GAP SERPL CALC-SCNC: 15 MMOL/L
AST SERPL-CCNC: 20 U/L
BASOPHILS # BLD AUTO: 0 K/UL — SIGNIFICANT CHANGE UP (ref 0–0.2)
BASOPHILS NFR BLD AUTO: 0.6 % — SIGNIFICANT CHANGE UP (ref 0–2)
BILIRUB SERPL-MCNC: 0.3 MG/DL
BUN SERPL-MCNC: 69 MG/DL
CALCIUM SERPL-MCNC: 10.2 MG/DL
CHLORIDE SERPL-SCNC: 100 MMOL/L
CO2 SERPL-SCNC: 24 MMOL/L
CREAT SERPL-MCNC: 2.37 MG/DL
EOSINOPHIL # BLD AUTO: 0.2 K/UL — SIGNIFICANT CHANGE UP (ref 0–0.5)
EOSINOPHIL NFR BLD AUTO: 2.6 % — SIGNIFICANT CHANGE UP (ref 0–6)
FERRITIN SERPL-MCNC: 60 NG/ML
FOLATE SERPL-MCNC: >20 NG/ML
GLUCOSE SERPL-MCNC: 51 MG/DL
HCT VFR BLD CALC: 34.2 % — LOW (ref 34.5–45)
HGB BLD-MCNC: 11.2 G/DL — LOW (ref 11.5–15.5)
IRON SATN MFR SERPL: 13 %
IRON SERPL-MCNC: 42 UG/DL
LYMPHOCYTES # BLD AUTO: 1.2 K/UL — SIGNIFICANT CHANGE UP (ref 1–3.3)
LYMPHOCYTES # BLD AUTO: 14.9 % — SIGNIFICANT CHANGE UP (ref 13–44)
MCHC RBC-ENTMCNC: 28.8 PG — SIGNIFICANT CHANGE UP (ref 27–34)
MCHC RBC-ENTMCNC: 32.8 G/DL — SIGNIFICANT CHANGE UP (ref 32–36)
MCV RBC AUTO: 87.7 FL — SIGNIFICANT CHANGE UP (ref 80–100)
MONOCYTES # BLD AUTO: 0.7 K/UL — SIGNIFICANT CHANGE UP (ref 0–0.9)
MONOCYTES NFR BLD AUTO: 9.2 % — SIGNIFICANT CHANGE UP (ref 2–14)
NEUTROPHILS # BLD AUTO: 5.8 K/UL — SIGNIFICANT CHANGE UP (ref 1.8–7.4)
NEUTROPHILS NFR BLD AUTO: 72.6 % — SIGNIFICANT CHANGE UP (ref 43–77)
PLATELET # BLD AUTO: 325 K/UL — SIGNIFICANT CHANGE UP (ref 150–400)
POTASSIUM SERPL-SCNC: 4.4 MMOL/L
PROT SERPL-MCNC: 7 G/DL
RBC # BLD: 3.87 M/UL
RBC # BLD: 3.9 M/UL — SIGNIFICANT CHANGE UP (ref 3.8–5.2)
RBC # FLD: 12.4 % — SIGNIFICANT CHANGE UP (ref 10.3–14.5)
RETICS # AUTO: 2.1 %
RETICS AGGREG/RBC NFR: 80.9 K/UL
SODIUM SERPL-SCNC: 139 MMOL/L
TIBC SERPL-MCNC: 334 UG/DL
UIBC SERPL-MCNC: 291 UG/DL
VIT B12 SERPL-MCNC: >2000 PG/ML
WBC # BLD: 7.9 K/UL — SIGNIFICANT CHANGE UP (ref 3.8–10.5)
WBC # FLD AUTO: 7.9 K/UL — SIGNIFICANT CHANGE UP (ref 3.8–10.5)

## 2021-06-16 PROCEDURE — 99212 OFFICE O/P EST SF 10 MIN: CPT

## 2021-06-18 ENCOUNTER — RX CHANGE (OUTPATIENT)
Age: 86
End: 2021-06-18

## 2021-06-18 PROBLEM — R41.3 MEMORY DIFFICULTIES: Status: ACTIVE | Noted: 2019-08-27

## 2021-06-18 PROBLEM — E78.5 HLD (HYPERLIPIDEMIA): Status: ACTIVE | Noted: 2019-06-26

## 2021-06-18 PROBLEM — R01.1 HEART MURMUR: Status: ACTIVE | Noted: 2020-05-12

## 2021-06-18 PROBLEM — Z79.899 ON STATIN THERAPY: Status: ACTIVE | Noted: 2020-05-12

## 2021-06-18 PROBLEM — Z86.73 STATUS POST CVA: Status: ACTIVE | Noted: 2021-06-18

## 2021-06-18 RX ORDER — LANCETS 28 GAUGE
EACH MISCELLANEOUS
Qty: 1 | Refills: 3 | Status: ACTIVE | COMMUNITY
Start: 2021-06-15 | End: 1900-01-01

## 2021-06-18 RX ORDER — BLOOD-GLUCOSE METER
KIT MISCELLANEOUS
Qty: 1 | Refills: 0 | Status: ACTIVE | COMMUNITY
Start: 2021-06-15 | End: 1900-01-01

## 2021-06-18 RX ORDER — BLOOD SUGAR DIAGNOSTIC
STRIP MISCELLANEOUS
Qty: 3 | Refills: 0 | Status: ACTIVE | COMMUNITY
Start: 2021-06-15 | End: 1900-01-01

## 2021-06-18 RX ORDER — INSULIN ASPART 100 [IU]/ML
100 INJECTION, SOLUTION INTRAVENOUS; SUBCUTANEOUS
Qty: 3 | Refills: 0 | Status: ACTIVE | COMMUNITY
Start: 2021-06-15 | End: 1900-01-01

## 2021-06-18 RX ORDER — PEN NEEDLE, DIABETIC 29 G X1/2"
32G X 4 MM NEEDLE, DISPOSABLE MISCELLANEOUS
Qty: 450 | Refills: 1 | Status: ACTIVE | COMMUNITY
Start: 2021-06-15 | End: 1900-01-01

## 2021-06-18 NOTE — PHYSICAL EXAM
[Normal] : normoactive bowel sounds, soft and nontender, no hepatosplenomegaly or masses appreciated [de-identified] : Elderly

## 2021-06-18 NOTE — ASSESSMENT
[FreeTextEntry1] : 84 y.o female with PMHx significant for HTN, HLD, DM2, diabetic neuropathy, PAD s/p Right LE Stent (?), memory loss, dementia - early Alzheimer's, presenting for follow up s/p Hospitalization for dysarthria, f/w small stroke , TIA vs Hyperglycemia.\par \par \par ENDOCRINE/NEUROLOGY: h/o T2DM, diabetic neuropathy, memory difficulties, advancing early Alzheimer's dementia, small brain infarcts.\par -Last HgA1c 7.0-->9.0 --> 11.1\par -Currently on Januvia 100 mg daily\par -Start Levemir 10 u at bedtime and will titrate as needed.\par -Humalog SS\par -Recommend fasting BS <130.\par -Moderate exercise recommended\par -Continue Metanx.\par -Diabetic eye exam July 2019, referral given not done yet.\par -Neurology Dr Tolentino saw pt in hospital, follow up as scheduled.\par -Dysarthria secondary to TIA vs Hyperglycemia.\par -Continue Memantine 5 mg daily\par \par RENAL: CKD Stage 5\par -Stable, last Cr 2.45, will check Renal panel today.\par -Renal US with [facility]\par -Nephrology Dr Marco A zheng, Appt coming up.\par -Continue Furosemide.\par -Avoid Nephrotoxins\par \par CVS: h/o HTN, HLD, PAD s/p stent to RLE for severe right leg claudication to right tibio-Peroneal and balloon Angioplasty to Right posterior tibial and right anterior tibial.\par -Cardiology - [facility], Dr D'Agate.\par -Stent placed by Dr Anurag Mcgee.\par -Blood pressure at goal\par -Continue Carvedilol 12.5 mg, Clonidine 0.2 mg tid, Hydralazine 100 mg TID, Simvastatin, Clopidogrel (Rx refilled).\par -Moderate exercise recommended.\par -NST 4/30/19 negative for Ischemia\par -Carotid duplex Mild-Mod 16-49% stenosis of Right bulb and pRICA. Mild to Mod 16-49% stenosis of the left Bulb and pLICA\par -2D Echo (5/20/19) EF 65-70%, normal LVSF, mild to mod AV stenosis.\par \par GI PPx- Continue Famotidine\par \par HEME: Anemia of chronic disease\par -Stable, currently s/p  Aranesp injections\par -Hematology note appreciated, Dr MILDRED Myers  following.\par \par HCM:\par -Ophthalmology referral given, no appointment set up yet.\par -Received Prevnar 13 in office Nov 2019\par -PFIZER Covid-19 vaccine X  2 DOSES 3/14/21, 4/14/21.\par -Flu vaccine administered in house 10/20\par -RTO in 6 weeks for diabetes check.

## 2021-06-18 NOTE — HISTORY OF PRESENT ILLNESS
[Post-hospitalization from ___ Hospital] : Post-hospitalization from [unfilled] Hospital [Admitted on: ___] : The patient was admitted on [unfilled] [Discharged on ___] : discharged on [unfilled] [Discharge Summary] : discharge summary [Pertinent Labs] : pertinent labs [Radiology Findings] : radiology findings [Discharge Med List] : discharge medication list [Med Reconciliation] : medication reconciliation has been completed [Patient Contacted By: ____] : and contacted by [unfilled] [FreeTextEntry2] : 85 y.o female with PMHx significant for HTN, HLD, DM2, diabetic neuropathy, PAD s/p Right femoral PCI, CKD stage 5, dementia, memory loss presented to [facility] on 5/20/21 brought in by family  c/o worsening weakness and slurred speech. She also noticed mom to be generally weak, slurring her words and having unsteady gait which prompted her to take pt to ER. In ER, Head CT was negative, pt seen by Neuro and Cardiology and admitted to inpatient. MRI Brain showed  an acute small right occipital lobe cortical infarct. Acute punctate right paramedian frontal lobe infarct. Stroke on MRI, small, possible TIA vs effect of hyperglycemia, work up not revealing need to address risk factors. Carotid duplex negative for severe stenosis,  echocardiogram showed moderate concentric left ventricular hypertrophy, suspicion of cardiac amyloidosis morphology. Left ventricular ejection fraction, by visual estimation, is 70 to 75%. Grade I diastolic dysfunction. Paradoxical low-flow, low-gradient severe aortic stenosis. Pt was  recommended to be discharged on ASA and Plavix with follow up with neuro and Cardiology upon discharge from rehab. . While at rehab patient's family were not happy with the care she was receiving and requested the patient to be discharged to home, pt was then discharged to home with home care. Family concerned regarding her blood sugars which have not been  under control and request an endocrinology consult.\par ...\par 85F hx DM2, CAD, PAD s/p femoral PCI, mild dementia, HTN, HLD, CKD presents with worsening weakness and slurred speech. She states that her mothers glucose has been poorly controlled and recently decdied to start patient on levemir. She was started on 8 units qhs to increase to 10. 2 nights ago glucose found to be 420. Daughter gave insulin, but glucose continued to rise. She also noticed mom to be generally weak, slurring her words and having unsteady gait. These symptoms continued and glucose continued it to be elevated prompting her to bring her to ER.\par \par In ER, CtH negative. Daughter states symptoms improving and mother near baseline. Pt admitted to medicine and seen by cardio and neuro in consult.\par \par patients a1c greater than 11\par \par mri - IMPRESSION: Acute small right occipital lobe cortical infarct. Acute punctate right paramedian frontal lobe infarct.\par patient seen by cardio who recommedned dc with asa and plavix and no further work up ] end of copied text

## 2021-06-18 NOTE — PHYSICAL EXAM
[Normal] : normal rate, regular rhythm, normal S1 and S2 and no murmur heard [No Focal Deficits] : no focal deficits [Normal Affect] : the affect was normal [Normal Insight/Judgement] : insight and judgment were intact [71641 - Moderate Complexity requires multiple possible diagnoses and/or the management options, moderate complexity of the medical data (tests, etc.) to be reviewed, and moderate risk of significant complications, morbidity, and/or mortality as well as co] : Moderate Complexity [de-identified] : shuffling gait, slow, needs assistance to get down from exam table.

## 2021-06-18 NOTE — ASSESSMENT
[FreeTextEntry1] : 85-year-old woman with diabetes and chronic renal insufficiency, recently discharged from rehab following CVA.\par Mild anemia related to chronic renal insufficiency requires no current hematologic therapy.

## 2021-06-18 NOTE — HISTORY OF PRESENT ILLNESS
[de-identified] : \par \par Ms. Hastings is an 84 yo presenting for follow up for anemia.\par She has a history of diabetes and renal failure, secondary to diabetes. She follows with Dr. Strickland and . Has been treated with aranesp. \par \par \par  [de-identified] : Has required recent adjustment of her insulin.\par Recent hospitalization for acute small right occipital lobe cortical infarct.\par Cardiology has recommended combination of aspirin and Plavix\par \par Today CBC shows a hemoglobin of 11.2, hematocrit 34.2.\par Serum ferritin 60, B12 and folate normal, BUN 69 and creatinine 2.37.

## 2021-06-18 NOTE — HEALTH RISK ASSESSMENT
[Intercurrent hospitalizations] : was admitted to the hospital  [No] : In the past 12 months have you used drugs other than those required for medical reasons? No [No falls in past year] : Patient reported no falls in the past year [] : No [Audit-CScore] : 0 [de-identified] : none [de-identified] : Diabetic [UnableToScreenReason] : dementia [PapSmearDate] : n/a [MammogramDate] : n/a [BoneDensityDate] : n/a [ColonoscopyDate] : n/a

## 2021-06-18 NOTE — DISCUSSION/SUMMARY
[Rehab] : rehab [Family Member] : family member [Discharge Summary] : The discharge summary was reviewed [Discharge Medication List] : The discharge medication list was reviewed [FreeTextEntry1] : [facility] [FreeTextEntry2] : 05/20/21 [Dischargedto] : [facility]  [FreeTextEntry3] : 05/24/21 [FreeTextEntry4] : CVA, Ataxia [FreeTextEntry5] : Beginning of copied text [ [FreeTextEntry7] : Pt's son-in-law Oneal reports pt is still at [facility] post discharge from [facility] for stroke, and it is unknown when pt will be discharged. I advised to contact us when pt is discharged for HFU; Oneal verbalized understanding. Venkata BRUNER aware.

## 2021-06-28 ENCOUNTER — APPOINTMENT (OUTPATIENT)
Dept: FAMILY MEDICINE | Facility: CLINIC | Age: 86
End: 2021-06-28
Payer: MEDICARE

## 2021-06-28 VITALS
WEIGHT: 140 LBS | HEART RATE: 80 BPM | RESPIRATION RATE: 16 BRPM | HEIGHT: 62 IN | OXYGEN SATURATION: 98 % | TEMPERATURE: 97.9 F | BODY MASS INDEX: 25.76 KG/M2 | SYSTOLIC BLOOD PRESSURE: 130 MMHG | DIASTOLIC BLOOD PRESSURE: 66 MMHG

## 2021-06-28 DIAGNOSIS — R10.31 RIGHT LOWER QUADRANT PAIN: ICD-10-CM

## 2021-06-28 PROCEDURE — 99213 OFFICE O/P EST LOW 20 MIN: CPT | Mod: 25

## 2021-06-28 PROCEDURE — 90732 PPSV23 VACC 2 YRS+ SUBQ/IM: CPT

## 2021-06-28 PROCEDURE — G0009: CPT

## 2021-06-28 RX ORDER — PREDNISOLONE ACETATE 10 MG/ML
1 SUSPENSION/ DROPS OPHTHALMIC
Qty: 5 | Refills: 0 | Status: ACTIVE | COMMUNITY
Start: 2021-06-18

## 2021-06-28 RX ORDER — CARVEDILOL 12.5 MG/1
12.5 TABLET, FILM COATED ORAL
Qty: 180 | Refills: 0 | Status: ACTIVE | COMMUNITY
Start: 2017-07-22 | End: 1900-01-01

## 2021-06-28 NOTE — HEALTH RISK ASSESSMENT
[Intercurrent hospitalizations] : was admitted to the hospital  [No falls in past year] : Patient reported no falls in the past year [MammogramDate] : n/a [PapSmearDate] : n/a [BoneDensityDate] : n/a [ColonoscopyDate] : n/a [No] : No [] : No [Audit-CScore] : 0 [de-identified] : none [de-identified] : Diabetic [UnableToScreenReason] : dementia

## 2021-06-28 NOTE — PHYSICAL EXAM
[Normal] : normal rate, regular rhythm, normal S1 and S2 and no murmur heard [de-identified] : RIGHT groin examined, ecchymotic area, no acute bleeding noted, FROM slight tenderness to palpation.

## 2021-06-28 NOTE — HISTORY OF PRESENT ILLNESS
[Post-hospitalization from ___ Hospital] : Post-hospitalization from [unfilled] Hospital [Admitted on: ___] : The patient was admitted on [unfilled] [Discharged on ___] : discharged on [unfilled] [Discharge Summary] : discharge summary [Pertinent Labs] : pertinent labs [Radiology Findings] : radiology findings [Discharge Med List] : discharge medication list [Med Reconciliation] : medication reconciliation has been completed [Patient Contacted By: ____] : and contacted by [unfilled] [FreeTextEntry2] : 85 y.o female with PMHx significant for HTN, HLD, DM2, diabetic neuropathy, PAD s/p Right femoral PCI, CKD stage 5, dementia, CVA, 5/21, dCHFpEF ( 70-75%), Severe AS, presenting c/o RIGHT groin pain s/p cath. Daughter also states that she was discharged on some medications from rehab that she was not taking before but does not know the name of the medications. [Family Member] : family member [FreeTextEntry8] : 85 y.o female with PMHx significant for HTN, HLD, DM2, diabetic neuropathy, PAD s/p Right femoral PCI, CKD stage 5, dementia, CVA, 5/21, dCHFpEF ( 70-75%), Severe AS, presenting c/o RIGHT groin pain s/p cath. Daughter also states that she was discharged on some medications from rehab that she was not taking before but does not know the name of the medications.

## 2021-06-28 NOTE — ASSESSMENT
[FreeTextEntry1] : 84 y.o female with PMHx significant for HTN, HLD, DM2, diabetic neuropathy, PAD s/p Right LE Stent (?), memory loss, dementia - early Alzheimer's, CVA, p/w RIGHT groin pain.\par \par ENDOCRINE/NEUROLOGY: h/o T2DM, diabetic neuropathy, memory difficulties, advancing early Alzheimer's dementia, small brain infarcts.\par -Last HgA1c 7.0-->9.0 --> 11.1\par -Currently on Januvia 100 mg daily\par -Start Levemir 10 u at bedtime and will titrate as needed.\par -Humalog SS\par -Recommend fasting BS <130.\par -Moderate exercise recommended\par -Continue Metanx.\par -Diabetic eye exam July 2019, referral given not done yet.\par -Neurology Dr Tolentino saw pt in hospital, follow up as scheduled.\par -Dysarthria secondary to TIA vs Hyperglycemia.\par -Continue Memantine 5 mg daily\par \par RENAL: CKD Stage 5\par -Stable, last Cr 2.45 -->1.60\par -Renal US with [facility] Renal Disease\par -Nephrology Dr Marco A zheng, Appt 7/9.\par -Continue Furosemide.\par -Avoid Nephrotoxins\par \par CVS: h/o HTN, HLD, PAD s/p stent to RLE for severe right leg claudication to right tibio-Peroneal and balloon Angioplasty to Right posterior tibial and right anterior tibial.\par -Cardiology - [facility], Dr D'Agate.\par -Stent placed by Dr Anurag Mcgee.\par -Blood pressure at goal\par -Continue Carvedilol 12.5 mg, Clonidine 0.2 mg tid, Hydralazine 100 mg TID, Simvastatin, Clopidogrel (Rx refilled).\par -Daughter claims mother is on Amlodipine, I have asked to bring discharge papers from Rehab.\par -Moderate exercise recommended.\par -NST 4/30/19 negative for Ischemia\par -Carotid duplex Mild-Mod 16-49% stenosis of Right bulb and pRICA. Mild to Mod 16-49% stenosis of the left Bulb and pLICA\par -2D Echo (5/20/19) EF 65-70%, normal LVSF, mild to mod AV stenosis.\par -Cardio eval for possible TAVR\par \par GI PPx- Continue Famotidine\par \par HEME: Anemia of chronic disease\par -Stable, currently s/p  Aranesp injections\par -Hematology note appreciated, Dr MILDRED Myers following.\par \par HCM:\par -Ophthalmology referral given, no appointment set up yet.\par -Received Prevnar 13 in office Nov 2019\par -PFIZER Covid-19 vaccine X  2 DOSES 3/14/21, 4/14/21.\par -Flu vaccine administered in house 10/20\par -RTO in 3 months for diabetes check.

## 2021-06-29 NOTE — ED ADULT NURSE NOTE - CAS TRG GEN SKIN COLOR
What Type Of Note Output Would You Prefer (Optional)?: Standard Output Hpi Title: Evaluation of Skin Lesions How Severe Are Your Spot(S)?: mild Have Your Spot(S) Been Treated In The Past?: has not been treated Normal for race

## 2021-07-01 ENCOUNTER — RX RENEWAL (OUTPATIENT)
Age: 86
End: 2021-07-01

## 2021-07-02 RX ORDER — LEVOCETIRIZINE DIHYDROCHLORIDE 5 MG/1
5 TABLET ORAL
Qty: 90 | Refills: 0 | Status: ACTIVE | COMMUNITY
Start: 2020-06-10 | End: 1900-01-01

## 2021-07-09 ENCOUNTER — APPOINTMENT (OUTPATIENT)
Dept: NEPHROLOGY | Facility: CLINIC | Age: 86
End: 2021-07-09

## 2021-07-29 ENCOUNTER — RX RENEWAL (OUTPATIENT)
Age: 86
End: 2021-07-29

## 2021-07-29 RX ORDER — CLONIDINE HYDROCHLORIDE 0.1 MG/1
0.1 TABLET ORAL
Qty: 270 | Refills: 0 | Status: ACTIVE | COMMUNITY
Start: 2021-07-29 | End: 1900-01-01

## 2021-08-03 ENCOUNTER — OUTPATIENT (OUTPATIENT)
Dept: OUTPATIENT SERVICES | Facility: HOSPITAL | Age: 86
LOS: 1 days | Discharge: HOME | End: 2021-08-03

## 2021-08-03 DIAGNOSIS — D64.9 ANEMIA, UNSPECIFIED: ICD-10-CM

## 2021-08-03 DIAGNOSIS — Z86.69 PERSONAL HISTORY OF OTHER DISEASES OF THE NERVOUS SYSTEM AND SENSE ORGANS: Chronic | ICD-10-CM

## 2021-08-04 ENCOUNTER — RESULT REVIEW (OUTPATIENT)
Age: 86
End: 2021-08-04

## 2021-08-04 ENCOUNTER — APPOINTMENT (OUTPATIENT)
Dept: HEMATOLOGY ONCOLOGY | Facility: CLINIC | Age: 86
End: 2021-08-04

## 2021-08-04 LAB
ALBUMIN SERPL ELPH-MCNC: 4.1 G/DL
ALP BLD-CCNC: 110 U/L
ALT SERPL-CCNC: 21 U/L
ANION GAP SERPL CALC-SCNC: 14 MMOL/L
AST SERPL-CCNC: 23 U/L
BASOPHILS # BLD AUTO: 0 K/UL — SIGNIFICANT CHANGE UP (ref 0–0.2)
BASOPHILS NFR BLD AUTO: 0.3 % — SIGNIFICANT CHANGE UP (ref 0–2)
BILIRUB SERPL-MCNC: 0.3 MG/DL
BUN SERPL-MCNC: 66 MG/DL
CALCIUM SERPL-MCNC: 9.6 MG/DL
CHLORIDE SERPL-SCNC: 99 MMOL/L
CO2 SERPL-SCNC: 28 MMOL/L
CREAT SERPL-MCNC: 2.85 MG/DL
EOSINOPHIL # BLD AUTO: 0.4 K/UL — SIGNIFICANT CHANGE UP (ref 0–0.5)
EOSINOPHIL NFR BLD AUTO: 4.9 % — SIGNIFICANT CHANGE UP (ref 0–6)
FERRITIN SERPL-MCNC: 56 NG/ML
FOLATE SERPL-MCNC: >20 NG/ML
GLUCOSE SERPL-MCNC: 175 MG/DL
HCT VFR BLD CALC: 31.3 % — LOW (ref 34.5–45)
HGB BLD-MCNC: 9.6 G/DL — LOW (ref 11.5–15.5)
IRON SATN MFR SERPL: 9 %
IRON SERPL-MCNC: 29 UG/DL
LYMPHOCYTES # BLD AUTO: 0.9 K/UL — LOW (ref 1–3.3)
LYMPHOCYTES # BLD AUTO: 12.3 % — LOW (ref 13–44)
MCHC RBC-ENTMCNC: 27.4 PG — SIGNIFICANT CHANGE UP (ref 27–34)
MCHC RBC-ENTMCNC: 30.7 G/DL — LOW (ref 32–36)
MCV RBC AUTO: 89.2 FL — SIGNIFICANT CHANGE UP (ref 80–100)
MONOCYTES # BLD AUTO: 0.4 K/UL — SIGNIFICANT CHANGE UP (ref 0–0.9)
MONOCYTES NFR BLD AUTO: 6.2 % — SIGNIFICANT CHANGE UP (ref 2–14)
NEUTROPHILS # BLD AUTO: 5.5 K/UL — SIGNIFICANT CHANGE UP (ref 1.8–7.4)
NEUTROPHILS NFR BLD AUTO: 76.4 % — SIGNIFICANT CHANGE UP (ref 43–77)
PLATELET # BLD AUTO: 306 K/UL — SIGNIFICANT CHANGE UP (ref 150–400)
POTASSIUM SERPL-SCNC: 3.5 MMOL/L
PROT SERPL-MCNC: 6.8 G/DL
RBC # BLD: 3.41 M/UL
RBC # BLD: 3.52 M/UL — LOW (ref 3.8–5.2)
RBC # FLD: 15.5 % — HIGH (ref 10.3–14.5)
RETICS # AUTO: 4.2 %
RETICS AGGREG/RBC NFR: 142.2 K/UL
SODIUM SERPL-SCNC: 141 MMOL/L
TIBC SERPL-MCNC: 322 UG/DL
UIBC SERPL-MCNC: 293 UG/DL
VIT B12 SERPL-MCNC: >2000 PG/ML
WBC # BLD: 7.2 K/UL — SIGNIFICANT CHANGE UP (ref 3.8–10.5)
WBC # FLD AUTO: 7.2 K/UL — SIGNIFICANT CHANGE UP (ref 3.8–10.5)

## 2021-08-05 ENCOUNTER — RX RENEWAL (OUTPATIENT)
Age: 86
End: 2021-08-05

## 2021-08-05 ENCOUNTER — NON-APPOINTMENT (OUTPATIENT)
Age: 86
End: 2021-08-05

## 2021-08-24 ENCOUNTER — NON-APPOINTMENT (OUTPATIENT)
Age: 86
End: 2021-08-24

## 2021-08-26 RX ORDER — FUROSEMIDE 40 MG/1
40 TABLET ORAL DAILY
Qty: 90 | Refills: 0 | Status: DISCONTINUED | COMMUNITY
Start: 2021-08-05 | End: 2021-08-26

## 2021-08-26 RX ORDER — HYDRALAZINE HYDROCHLORIDE 50 MG/1
50 TABLET ORAL
Qty: 270 | Refills: 3 | Status: DISCONTINUED | COMMUNITY
Start: 2021-04-20 | End: 2021-08-26

## 2021-08-28 ENCOUNTER — TRANSCRIPTION ENCOUNTER (OUTPATIENT)
Age: 86
End: 2021-08-28

## 2021-08-30 ENCOUNTER — RX RENEWAL (OUTPATIENT)
Age: 86
End: 2021-08-30

## 2021-08-31 ENCOUNTER — NON-APPOINTMENT (OUTPATIENT)
Age: 86
End: 2021-08-31

## 2021-08-31 RX ORDER — PANTOPRAZOLE 40 MG/1
40 TABLET, DELAYED RELEASE ORAL
Qty: 90 | Refills: 0 | Status: ACTIVE | COMMUNITY
Start: 2021-06-10 | End: 1900-01-01

## 2021-08-31 RX ORDER — SIMVASTATIN 20 MG/1
20 TABLET, FILM COATED ORAL
Qty: 90 | Refills: 0 | Status: ACTIVE | COMMUNITY
Start: 2017-08-10 | End: 1900-01-01

## 2021-09-02 ENCOUNTER — APPOINTMENT (OUTPATIENT)
Dept: FAMILY MEDICINE | Facility: CLINIC | Age: 86
End: 2021-09-02
Payer: MEDICARE

## 2021-09-02 VITALS
BODY MASS INDEX: 25.76 KG/M2 | HEART RATE: 81 BPM | SYSTOLIC BLOOD PRESSURE: 130 MMHG | OXYGEN SATURATION: 98 % | RESPIRATION RATE: 12 BRPM | HEIGHT: 62 IN | DIASTOLIC BLOOD PRESSURE: 72 MMHG | WEIGHT: 140 LBS | TEMPERATURE: 97.2 F

## 2021-09-02 DIAGNOSIS — F41.9 ANXIETY DISORDER, UNSPECIFIED: ICD-10-CM

## 2021-09-02 DIAGNOSIS — R45.1 RESTLESSNESS AND AGITATION: ICD-10-CM

## 2021-09-02 DIAGNOSIS — M25.571 PAIN IN RIGHT ANKLE AND JOINTS OF RIGHT FOOT: ICD-10-CM

## 2021-09-02 PROCEDURE — 99495 TRANSJ CARE MGMT MOD F2F 14D: CPT

## 2021-09-03 NOTE — HEALTH RISK ASSESSMENT
[Intercurrent hospitalizations] : was admitted to the hospital  [No] : In the past 12 months have you used drugs other than those required for medical reasons? No [No falls in past year] : Patient reported no falls in the past year [0] : 2) Feeling down, depressed, or hopeless: Not at all (0) [PHQ-2 Negative - No further assessment needed] : PHQ-2 Negative - No further assessment needed [] : No [Audit-CScore] : 0 [de-identified] : none [de-identified] : Diabetic [[id number]] : 0

## 2021-09-03 NOTE — HISTORY OF PRESENT ILLNESS
[Post-hospitalization from ___ Hospital] : Post-hospitalization from [unfilled] Hospital [Admitted on: ___] : The patient was admitted on [unfilled] [Discharged on ___] : discharged on [unfilled] [Discharge Summary] : discharge summary [Pertinent Labs] : pertinent labs [Radiology Findings] : radiology findings [Discharge Med List] : discharge medication list [Med Reconciliation] : medication reconciliation has been completed [Patient Contacted By: ____] : and contacted by [unfilled] [FreeTextEntry2] : Pt taken to hospital by family after finding her BP rise into the 200s systolic despite taking medications regularly. Pt was found with UTI and NSTEMI in setting of hypertensive urgency, was treated with Cefpodoxime 200 mg for total of 10 days, blood pressure medications were adjusted, Furosemide discontinued, Clonidine increased to 0.3 mg tid and Hydralazine changed from 50 mg qid to 100 mg TID. \par Pt has continued to have spikes on her BP medications since.

## 2021-09-03 NOTE — ASSESSMENT
[FreeTextEntry1] : 84 y.o female with PMHx significant for HTN, HLD, DM2, diabetic neuropathy, PAD s/p Right LE Stent (?), memory loss, dementia - early Alzheimer's, CVA, CKD, Diabetic Nephropathy, diabetic neuropathy\par \par ENDOCRINE/NEUROLOGY: h/o T2DM, diabetic neuropathy, memory difficulties, advancing early Alzheimer's dementia, small brain infarcts.\par -Last HgA1c 7.0-->9.0 --> 11.1\par -Currently on Januvia 100 mg daily\par -Basaglar 14 u as per Endocrine.\par -Recommend fasting BS <130.\par -Moderate exercise recommended\par -Continue Metanx.\par -Diabetic eye exam May 2021, no retinopathy.\par -Neurology Dr Tolentino, follow up as scheduled.\par -Dysarthria secondary to TIA vs Hyperglycemia.\par -Continue Memantine 5 mg daily\par \par RENAL: CKD Stage 5, Diabetic Nephropathy\par -Stable, last Cr 2.45 -->1.60\par -Renal US with Medical Renal Disease\par -Nephrology following\par -Discontinued Furosemide.\par -Avoid Nephrotoxins\par \par CVS: h/o HTN, HLD, PAD s/p stent to RLE for severe right leg claudication to right tibio-Peroneal and balloon Angioplasty to Right posterior tibial and right anterior tibial, f/w NSTEMI likely type 2 in setting of hypertensive urgency\par -Cardiology - [facility], Dr D'Agate.\par -Stent placed by Dr Anurag Mcgee.\par -Blood pressure at goal today, has had episodes of elevated BP to the 180s systolic\par -Continue Carvedilol 12.5 mg bid, Clonidine 0.3 mg tid, Hydralazine 100 mg TID, Simvastatin, Nifedipine XL 90 mg, Clopidogrel (Rx refilled).\par -Continue 1/2 tab Hydralazine prn for BP >170 until seen by Cardiology (appt 9/16)\par -Moderate exercise recommended.\par -NST 4/30/19 negative for Ischemia\par -Carotid duplex Mild-Mod 16-49% stenosis of Right bulb and pRICA. Mild to Mod 16-49% stenosis of the left Bulb and pLICA\par -2D Echo (5/20/19) EF 65-70%, normal LVSF, mild to mod AV stenosis.\par -Cardio eval for possible TAVR\par \par GI PPx- Continue Famotidine\par \par HEME: Anemia of chronic disease\par -Stable, currently s/p  Aranesp injections\par -Hematology Dr MILDRED Myers following.\par \par HCM:\par -Unable to get blood work today.\par -Received Prevnar 13 in office Nov 2019\par -PFIZER Covid-19 vaccine X  2 DOSES 3/14/21, 4/14/21.\par -Flu vaccine administered in house 10/20\par -RTO in 1 months for BP and diabetes check.

## 2021-09-03 NOTE — COUNSELING
[Fall prevention counseling provided] : Fall prevention counseling provided [Adequate lighting] : Adequate lighting [No throw rugs] : No throw rugs [Behavioral health counseling provided] : Behavioral health counseling provided [Sleep ___ hours/day] : Sleep [unfilled] hours/day [AUDIT-C Screening administered and reviewed] : AUDIT-C Screening administered and reviewed [Encouraged to increase physical activity] : Encouraged to increase physical activity [____ min/wk Activity] : [unfilled] min/wk activity [None] : None [Good understanding] : Patient has a good understanding of lifestyle changes and steps needed to achieve self management goal

## 2021-09-03 NOTE — PHYSICAL EXAM
[Pedal Pulses Present] : the pedal pulses are present [Normal] : soft, non-tender, non-distended, no masses palpated, no HSM and normal bowel sounds [80226 - Moderate Complexity requires multiple possible diagnoses and/or the management options, moderate complexity of the medical data (tests, etc.) to be reviewed, and moderate risk of significant complications, morbidity, and/or mortality as well as co] : Moderate Complexity [de-identified] : Bilateral ankle edema 1+ pitting [de-identified] : Left ankle pain to palpation on the lateral aspect of the RIGHHT ankle, no signs of trauma, pain in the same area also elicited on eversion.

## 2021-09-07 RX ORDER — DICLOFENAC SODIUM 1% 10 MG/G
1 GEL TOPICAL DAILY
Qty: 1 | Refills: 2 | Status: ACTIVE | COMMUNITY
Start: 2021-09-07 | End: 1900-01-01

## 2021-09-07 RX ORDER — ALPRAZOLAM 0.25 MG/1
0.25 TABLET ORAL 3 TIMES DAILY
Qty: 45 | Refills: 0 | Status: ACTIVE | COMMUNITY
Start: 2021-09-07 | End: 1900-01-01

## 2021-09-10 ENCOUNTER — OUTPATIENT (OUTPATIENT)
Dept: OUTPATIENT SERVICES | Facility: HOSPITAL | Age: 86
LOS: 1 days | Discharge: HOME | End: 2021-09-10

## 2021-09-10 DIAGNOSIS — Z86.69 PERSONAL HISTORY OF OTHER DISEASES OF THE NERVOUS SYSTEM AND SENSE ORGANS: Chronic | ICD-10-CM

## 2021-09-10 DIAGNOSIS — D64.9 ANEMIA, UNSPECIFIED: ICD-10-CM

## 2021-09-11 ENCOUNTER — INPATIENT (INPATIENT)
Facility: HOSPITAL | Age: 86
LOS: 4 days | Discharge: HOME | DRG: 291 | End: 2021-09-16
Attending: HOSPITALIST | Admitting: HOSPITALIST
Payer: MEDICARE

## 2021-09-11 VITALS
HEART RATE: 60 BPM | SYSTOLIC BLOOD PRESSURE: 173 MMHG | OXYGEN SATURATION: 96 % | HEIGHT: 61 IN | WEIGHT: 134.04 LBS | TEMPERATURE: 97 F | RESPIRATION RATE: 26 BRPM | DIASTOLIC BLOOD PRESSURE: 73 MMHG

## 2021-09-11 DIAGNOSIS — Z86.69 PERSONAL HISTORY OF OTHER DISEASES OF THE NERVOUS SYSTEM AND SENSE ORGANS: Chronic | ICD-10-CM

## 2021-09-11 LAB
ALBUMIN SERPL ELPH-MCNC: 3.9 G/DL — SIGNIFICANT CHANGE UP (ref 3.3–5.2)
ALP SERPL-CCNC: 136 U/L — HIGH (ref 40–120)
ALT FLD-CCNC: 32 U/L — SIGNIFICANT CHANGE UP
ANION GAP SERPL CALC-SCNC: 18 MMOL/L — HIGH (ref 5–17)
AST SERPL-CCNC: 27 U/L — SIGNIFICANT CHANGE UP
BASE EXCESS BLDV CALC-SCNC: -0.4 MMOL/L — SIGNIFICANT CHANGE UP (ref -2–3)
BASOPHILS # BLD AUTO: 0.02 K/UL — SIGNIFICANT CHANGE UP (ref 0–0.2)
BASOPHILS NFR BLD AUTO: 0.3 % — SIGNIFICANT CHANGE UP (ref 0–2)
BILIRUB SERPL-MCNC: 0.3 MG/DL — LOW (ref 0.4–2)
BUN SERPL-MCNC: 60.9 MG/DL — HIGH (ref 8–20)
CA-I SERPL-SCNC: 1.17 MMOL/L — SIGNIFICANT CHANGE UP (ref 1.15–1.33)
CALCIUM SERPL-MCNC: 9 MG/DL — SIGNIFICANT CHANGE UP (ref 8.6–10.2)
CHLORIDE BLDV-SCNC: 105 MMOL/L — SIGNIFICANT CHANGE UP (ref 98–107)
CHLORIDE SERPL-SCNC: 101 MMOL/L — SIGNIFICANT CHANGE UP (ref 98–107)
CO2 SERPL-SCNC: 20 MMOL/L — LOW (ref 22–29)
CREAT SERPL-MCNC: 2.65 MG/DL — HIGH (ref 0.5–1.3)
EOSINOPHIL # BLD AUTO: 0.51 K/UL — HIGH (ref 0–0.5)
EOSINOPHIL NFR BLD AUTO: 8.9 % — HIGH (ref 0–6)
GAS PNL BLDV: 137 MMOL/L — SIGNIFICANT CHANGE UP (ref 136–145)
GAS PNL BLDV: SIGNIFICANT CHANGE UP
GLUCOSE BLDV-MCNC: 267 MG/DL — HIGH (ref 70–99)
GLUCOSE SERPL-MCNC: 250 MG/DL — HIGH (ref 70–99)
HCO3 BLDV-SCNC: 23 MMOL/L — SIGNIFICANT CHANGE UP (ref 22–29)
HCT VFR BLD CALC: 29.5 % — LOW (ref 34.5–45)
HCT VFR BLDA CALC: 29 % — LOW (ref 34–46)
HGB BLD CALC-MCNC: 9.8 G/DL — LOW (ref 11.7–16.1)
HGB BLD-MCNC: 9.1 G/DL — LOW (ref 11.5–15.5)
IMM GRANULOCYTES NFR BLD AUTO: 0.3 % — SIGNIFICANT CHANGE UP (ref 0–1.5)
LACTATE BLDV-MCNC: 0.8 MMOL/L — SIGNIFICANT CHANGE UP (ref 0.5–2)
LYMPHOCYTES # BLD AUTO: 0.99 K/UL — LOW (ref 1–3.3)
LYMPHOCYTES # BLD AUTO: 17.3 % — SIGNIFICANT CHANGE UP (ref 13–44)
MAGNESIUM SERPL-MCNC: 2.6 MG/DL — SIGNIFICANT CHANGE UP (ref 1.6–2.6)
MCHC RBC-ENTMCNC: 25.8 PG — LOW (ref 27–34)
MCHC RBC-ENTMCNC: 30.8 GM/DL — LOW (ref 32–36)
MCV RBC AUTO: 83.6 FL — SIGNIFICANT CHANGE UP (ref 80–100)
MONOCYTES # BLD AUTO: 0.5 K/UL — SIGNIFICANT CHANGE UP (ref 0–0.9)
MONOCYTES NFR BLD AUTO: 8.7 % — SIGNIFICANT CHANGE UP (ref 2–14)
NEUTROPHILS # BLD AUTO: 3.68 K/UL — SIGNIFICANT CHANGE UP (ref 1.8–7.4)
NEUTROPHILS NFR BLD AUTO: 64.5 % — SIGNIFICANT CHANGE UP (ref 43–77)
NT-PROBNP SERPL-SCNC: 3932 PG/ML — HIGH (ref 0–300)
PCO2 BLDV: 32 MMHG — LOW (ref 39–42)
PH BLDV: 7.47 — HIGH (ref 7.32–7.43)
PLATELET # BLD AUTO: 243 K/UL — SIGNIFICANT CHANGE UP (ref 150–400)
PO2 BLDV: 173 MMHG — HIGH (ref 25–45)
POTASSIUM BLDV-SCNC: 3.8 MMOL/L — SIGNIFICANT CHANGE UP (ref 3.5–5.1)
POTASSIUM SERPL-MCNC: 3.8 MMOL/L — SIGNIFICANT CHANGE UP (ref 3.5–5.3)
POTASSIUM SERPL-SCNC: 3.8 MMOL/L — SIGNIFICANT CHANGE UP (ref 3.5–5.3)
PROT SERPL-MCNC: 7.2 G/DL — SIGNIFICANT CHANGE UP (ref 6.6–8.7)
RBC # BLD: 3.53 M/UL — LOW (ref 3.8–5.2)
RBC # FLD: 16.8 % — HIGH (ref 10.3–14.5)
SAO2 % BLDV: 97.8 % — SIGNIFICANT CHANGE UP
SODIUM SERPL-SCNC: 139 MMOL/L — SIGNIFICANT CHANGE UP (ref 135–145)
TROPONIN T SERPL-MCNC: 0.02 NG/ML — SIGNIFICANT CHANGE UP (ref 0–0.06)
WBC # BLD: 5.72 K/UL — SIGNIFICANT CHANGE UP (ref 3.8–10.5)
WBC # FLD AUTO: 5.72 K/UL — SIGNIFICANT CHANGE UP (ref 3.8–10.5)

## 2021-09-11 PROCEDURE — 93010 ELECTROCARDIOGRAM REPORT: CPT

## 2021-09-11 PROCEDURE — 71046 X-RAY EXAM CHEST 2 VIEWS: CPT | Mod: 26

## 2021-09-11 PROCEDURE — 99285 EMERGENCY DEPT VISIT HI MDM: CPT

## 2021-09-11 NOTE — ED ADULT NURSE NOTE - OBJECTIVE STATEMENT
Patient A&Ox4 complaining of SOB, pain with urination, upper abdominal "burning" sensation, as well as b/l swelling in the legs.  Pt with 2+ pitting edema in lower extremities x 1 day.  Cardiac monitoring and pulse oximetry initiated, NSR on monitor, O2 sat 94% on RA. NAD noted, respirations even and unlabored.  Safety precautions in place.  Plan of care explained, pt verbalized understanding.  services used. Patient A&Ox4 complaining of SOB, pain with urination, upper abdominal "burning" sensation, as well as b/l swelling in the legs. pt has pain with urination, denies hematuria.  Pt with nausea, denies vomiting.  Pt with 2+ pitting edema in lower extremities x 1 day.  Cardiac monitoring and pulse oximetry initiated, NSR on monitor, O2 sat 94% on RA. NAD noted, respirations even and unlabored.  Safety precautions in place.  Plan of care explained, pt verbalized understanding.  services used.

## 2021-09-11 NOTE — ED ADULT TRIAGE NOTE - CHIEF COMPLAINT QUOTE
patient brought in by daughter states that she is having SOB swelling to lower extremities and burning in abdominal area pain with urination x 2 weeks in hospital 3 weeks ago at [facility] HX of Aotic valve replacement, MI, CVA

## 2021-09-11 NOTE — ED ADULT NURSE NOTE - CHIEF COMPLAINT QUOTE
patient brought in by daughter states that she is having SOB swelling to lower extremities and burning in abdominal area pain with urination x 2 weeks in hospital 3 weeks ago at [facility] HX of Aotic valve replacement, MI, CVA

## 2021-09-12 DIAGNOSIS — J81.1 CHRONIC PULMONARY EDEMA: ICD-10-CM

## 2021-09-12 LAB
APPEARANCE UR: CLEAR — SIGNIFICANT CHANGE UP
BACTERIA # UR AUTO: ABNORMAL
BILIRUB UR-MCNC: ABNORMAL
COLOR SPEC: YELLOW — SIGNIFICANT CHANGE UP
DIFF PNL FLD: NEGATIVE — SIGNIFICANT CHANGE UP
EPI CELLS # UR: SIGNIFICANT CHANGE UP
GLUCOSE UR QL: NEGATIVE — SIGNIFICANT CHANGE UP
KETONES UR-MCNC: NEGATIVE — SIGNIFICANT CHANGE UP
LEUKOCYTE ESTERASE UR-ACNC: ABNORMAL
NITRITE UR-MCNC: POSITIVE
PH UR: 6 — SIGNIFICANT CHANGE UP (ref 5–8)
PROT UR-MCNC: 100
RAPID RVP RESULT: SIGNIFICANT CHANGE UP
RBC CASTS # UR COMP ASSIST: SIGNIFICANT CHANGE UP /HPF (ref 0–4)
SARS-COV-2 RNA SPEC QL NAA+PROBE: SIGNIFICANT CHANGE UP
SP GR SPEC: 1.01 — SIGNIFICANT CHANGE UP (ref 1.01–1.02)
UROBILINOGEN FLD QL: 4
WBC UR QL: ABNORMAL

## 2021-09-12 PROCEDURE — 76705 ECHO EXAM OF ABDOMEN: CPT | Mod: 26,RT

## 2021-09-12 PROCEDURE — 74176 CT ABD & PELVIS W/O CONTRAST: CPT | Mod: 26,MG

## 2021-09-12 PROCEDURE — 99223 1ST HOSP IP/OBS HIGH 75: CPT

## 2021-09-12 PROCEDURE — G1004: CPT

## 2021-09-12 RX ORDER — NIFEDIPINE 30 MG
90 TABLET, EXTENDED RELEASE 24 HR ORAL DAILY
Refills: 0 | Status: DISCONTINUED | OUTPATIENT
Start: 2021-09-12 | End: 2021-09-16

## 2021-09-12 RX ORDER — CARVEDILOL PHOSPHATE 80 MG/1
1 CAPSULE, EXTENDED RELEASE ORAL
Qty: 0 | Refills: 0 | DISCHARGE

## 2021-09-12 RX ORDER — MEMANTINE HYDROCHLORIDE 10 MG/1
5 TABLET ORAL DAILY
Refills: 0 | Status: DISCONTINUED | OUTPATIENT
Start: 2021-09-12 | End: 2021-09-16

## 2021-09-12 RX ORDER — HYDRALAZINE HCL 50 MG
1 TABLET ORAL
Qty: 0 | Refills: 0 | DISCHARGE

## 2021-09-12 RX ORDER — SIMVASTATIN 20 MG/1
20 TABLET, FILM COATED ORAL AT BEDTIME
Refills: 0 | Status: DISCONTINUED | OUTPATIENT
Start: 2021-09-12 | End: 2021-09-16

## 2021-09-12 RX ORDER — ASPIRIN/CALCIUM CARB/MAGNESIUM 324 MG
81 TABLET ORAL DAILY
Refills: 0 | Status: DISCONTINUED | OUTPATIENT
Start: 2021-09-12 | End: 2021-09-16

## 2021-09-12 RX ORDER — GABAPENTIN 400 MG/1
1 CAPSULE ORAL
Qty: 0 | Refills: 0 | DISCHARGE

## 2021-09-12 RX ORDER — CLOPIDOGREL BISULFATE 75 MG/1
75 TABLET, FILM COATED ORAL DAILY
Refills: 0 | Status: DISCONTINUED | OUTPATIENT
Start: 2021-09-12 | End: 2021-09-16

## 2021-09-12 RX ORDER — FUROSEMIDE 40 MG
40 TABLET ORAL DAILY
Refills: 0 | Status: DISCONTINUED | OUTPATIENT
Start: 2021-09-12 | End: 2021-09-14

## 2021-09-12 RX ORDER — CEFTRIAXONE 500 MG/1
1000 INJECTION, POWDER, FOR SOLUTION INTRAMUSCULAR; INTRAVENOUS ONCE
Refills: 0 | Status: COMPLETED | OUTPATIENT
Start: 2021-09-12 | End: 2021-09-12

## 2021-09-12 RX ORDER — SIMVASTATIN 20 MG/1
1 TABLET, FILM COATED ORAL
Qty: 0 | Refills: 0 | DISCHARGE

## 2021-09-12 RX ORDER — PREGABALIN 225 MG/1
1 CAPSULE ORAL
Qty: 0 | Refills: 0 | DISCHARGE

## 2021-09-12 RX ORDER — INSULIN DETEMIR 100/ML (3)
30 INSULIN PEN (ML) SUBCUTANEOUS
Qty: 0 | Refills: 0 | DISCHARGE

## 2021-09-12 RX ORDER — INSULIN GLARGINE 100 [IU]/ML
14 INJECTION, SOLUTION SUBCUTANEOUS
Qty: 0 | Refills: 0 | DISCHARGE

## 2021-09-12 RX ORDER — FERROUS SULFATE 325(65) MG
1 TABLET ORAL
Qty: 0 | Refills: 0 | DISCHARGE

## 2021-09-12 RX ORDER — LORATADINE 10 MG/1
10 TABLET ORAL DAILY
Refills: 0 | Status: DISCONTINUED | OUTPATIENT
Start: 2021-09-12 | End: 2021-09-16

## 2021-09-12 RX ORDER — MEMANTINE HYDROCHLORIDE 10 MG/1
1 TABLET ORAL
Qty: 0 | Refills: 0 | DISCHARGE

## 2021-09-12 RX ORDER — HYDRALAZINE HCL 50 MG
100 TABLET ORAL THREE TIMES A DAY
Refills: 0 | Status: DISCONTINUED | OUTPATIENT
Start: 2021-09-12 | End: 2021-09-16

## 2021-09-12 RX ORDER — PYRIDOXINE HCL (VITAMIN B6) 100 MG
1 TABLET ORAL
Qty: 0 | Refills: 0 | DISCHARGE

## 2021-09-12 RX ORDER — CEFTRIAXONE 500 MG/1
1000 INJECTION, POWDER, FOR SOLUTION INTRAMUSCULAR; INTRAVENOUS EVERY 24 HOURS
Refills: 0 | Status: DISCONTINUED | OUTPATIENT
Start: 2021-09-12 | End: 2021-09-16

## 2021-09-12 RX ORDER — FUROSEMIDE 40 MG
40 TABLET ORAL ONCE
Refills: 0 | Status: COMPLETED | OUTPATIENT
Start: 2021-09-12 | End: 2021-09-12

## 2021-09-12 RX ORDER — HEPARIN SODIUM 5000 [USP'U]/ML
5000 INJECTION INTRAVENOUS; SUBCUTANEOUS EVERY 8 HOURS
Refills: 0 | Status: DISCONTINUED | OUTPATIENT
Start: 2021-09-12 | End: 2021-09-16

## 2021-09-12 RX ORDER — FERROUS SULFATE 325(65) MG
325 TABLET ORAL DAILY
Refills: 0 | Status: DISCONTINUED | OUTPATIENT
Start: 2021-09-12 | End: 2021-09-16

## 2021-09-12 RX ORDER — ASCORBIC ACID 60 MG
1 TABLET,CHEWABLE ORAL
Qty: 0 | Refills: 0 | DISCHARGE

## 2021-09-12 RX ORDER — GABAPENTIN 400 MG/1
100 CAPSULE ORAL THREE TIMES A DAY
Refills: 0 | Status: DISCONTINUED | OUTPATIENT
Start: 2021-09-12 | End: 2021-09-16

## 2021-09-12 RX ORDER — CARVEDILOL PHOSPHATE 80 MG/1
12.5 CAPSULE, EXTENDED RELEASE ORAL EVERY 12 HOURS
Refills: 0 | Status: DISCONTINUED | OUTPATIENT
Start: 2021-09-12 | End: 2021-09-16

## 2021-09-12 RX ADMIN — Medication 81 MILLIGRAM(S): at 11:02

## 2021-09-12 RX ADMIN — GABAPENTIN 100 MILLIGRAM(S): 400 CAPSULE ORAL at 22:07

## 2021-09-12 RX ADMIN — GABAPENTIN 100 MILLIGRAM(S): 400 CAPSULE ORAL at 13:36

## 2021-09-12 RX ADMIN — Medication 40 MILLIGRAM(S): at 01:04

## 2021-09-12 RX ADMIN — CEFTRIAXONE 100 MILLIGRAM(S): 500 INJECTION, POWDER, FOR SOLUTION INTRAMUSCULAR; INTRAVENOUS at 10:05

## 2021-09-12 RX ADMIN — Medication 100 MILLIGRAM(S): at 22:07

## 2021-09-12 RX ADMIN — Medication 40 MILLIGRAM(S): at 07:20

## 2021-09-12 RX ADMIN — Medication 0.1 MILLIGRAM(S): at 13:35

## 2021-09-12 RX ADMIN — HEPARIN SODIUM 5000 UNIT(S): 5000 INJECTION INTRAVENOUS; SUBCUTANEOUS at 07:20

## 2021-09-12 RX ADMIN — GABAPENTIN 100 MILLIGRAM(S): 400 CAPSULE ORAL at 07:21

## 2021-09-12 RX ADMIN — Medication 100 MILLIGRAM(S): at 13:35

## 2021-09-12 RX ADMIN — Medication 0.2 MILLIGRAM(S): at 07:20

## 2021-09-12 RX ADMIN — MEMANTINE HYDROCHLORIDE 5 MILLIGRAM(S): 10 TABLET ORAL at 13:35

## 2021-09-12 RX ADMIN — Medication 0.1 MILLIGRAM(S): at 07:20

## 2021-09-12 RX ADMIN — CARVEDILOL PHOSPHATE 12.5 MILLIGRAM(S): 80 CAPSULE, EXTENDED RELEASE ORAL at 07:21

## 2021-09-12 RX ADMIN — Medication 90 MILLIGRAM(S): at 07:21

## 2021-09-12 RX ADMIN — CEFTRIAXONE 100 MILLIGRAM(S): 500 INJECTION, POWDER, FOR SOLUTION INTRAMUSCULAR; INTRAVENOUS at 01:04

## 2021-09-12 RX ADMIN — HEPARIN SODIUM 5000 UNIT(S): 5000 INJECTION INTRAVENOUS; SUBCUTANEOUS at 22:08

## 2021-09-12 RX ADMIN — LORATADINE 10 MILLIGRAM(S): 10 TABLET ORAL at 13:35

## 2021-09-12 RX ADMIN — Medication 0.2 MILLIGRAM(S): at 13:35

## 2021-09-12 RX ADMIN — Medication 325 MILLIGRAM(S): at 13:34

## 2021-09-12 RX ADMIN — Medication 0.1 MILLIGRAM(S): at 22:07

## 2021-09-12 RX ADMIN — HEPARIN SODIUM 5000 UNIT(S): 5000 INJECTION INTRAVENOUS; SUBCUTANEOUS at 13:38

## 2021-09-12 RX ADMIN — Medication 0.2 MILLIGRAM(S): at 22:08

## 2021-09-12 RX ADMIN — CLOPIDOGREL BISULFATE 75 MILLIGRAM(S): 75 TABLET, FILM COATED ORAL at 11:03

## 2021-09-12 RX ADMIN — Medication 100 MILLIGRAM(S): at 07:20

## 2021-09-12 RX ADMIN — CEFTRIAXONE 1000 MILLIGRAM(S): 500 INJECTION, POWDER, FOR SOLUTION INTRAMUSCULAR; INTRAVENOUS at 02:06

## 2021-09-12 RX ADMIN — SIMVASTATIN 20 MILLIGRAM(S): 20 TABLET, FILM COATED ORAL at 22:07

## 2021-09-12 NOTE — CONSULT NOTE ADULT - SUBJECTIVE AND OBJECTIVE BOX
[facility], [facility]                                                    [address], [address], [place] [address]                                                         PHONE: [phone]    FAX: [phone] [address], [address], [place] [address]                                                 PHONE: [phone]    FAX: [phone]  *******************************************************************************    Reason for Consult: Congestive Heart Failure    HPI:  PAULINO LAM is a 85y Female with h/o severe AS s/p bioprosthetic AVR (TAVR) 7/14/21, normal LV fxn, essentially normal coronary arteries with minor luminal irregularities on cardiac catheterization 6/24/21, NSTEMI 8/21 2/2 demand ischemia in the setting of uncontrolled HTN, chronic diastolic CHF, PVD s/p LE stent 8/13/18, carotid disease, CVA 5/20/21, DM, HTN, HL, CRI a/w SOB, RUTH, fatigue, orthopnea, PND & LE edema.  + Epigastric pain.  No chest pain or associated N/V, diaphoresis or radiation.  No palpitations, LH, syncope.  No fever, chills or ocugh.  + Dysuria.  Daughter is at the bedside.    PAST MEDICAL & SURGICAL HISTORY:  HTN (hypertension)    High cholesterol    DM (diabetes mellitus)    Cardiomegaly    PAD (peripheral artery disease)    H/O cataract        No Known Allergies      MEDICATIONS  (STANDING):  aspirin enteric coated 81 milliGRAM(s) Oral daily  carvedilol 12.5 milliGRAM(s) Oral every 12 hours  cefTRIAXone   IVPB 1000 milliGRAM(s) IV Intermittent every 24 hours  cloNIDine 0.1 milliGRAM(s) Oral three times a day  cloNIDine 0.2 milliGRAM(s) Oral three times a day  clopidogrel Tablet 75 milliGRAM(s) Oral daily  ferrous    sulfate 325 milliGRAM(s) Oral daily  furosemide   Injectable 40 milliGRAM(s) IV Push daily  gabapentin 100 milliGRAM(s) Oral three times a day  heparin   Injectable 5000 Unit(s) SubCutaneous every 8 hours  hydrALAZINE 100 milliGRAM(s) Oral three times a day  loratadine 10 milliGRAM(s) Oral daily  memantine 5 milliGRAM(s) Oral daily  NIFEdipine XL 90 milliGRAM(s) Oral daily  simvastatin 20 milliGRAM(s) Oral at bedtime    MEDICATIONS  (PRN):      Social History: no active tobacco / EtOH / IVDA    Family History: No pertinent family history in first degree relatives        ROS: As noted above, otherwise unremarkable.    Vital Signs Last 24 Hrs  T(C): 36.6 (12 Sep 2021 01:06), Max: 36.6 (12 Sep 2021 01:06)  T(F): 97.8 (12 Sep 2021 01:06), Max: 97.8 (12 Sep 2021 01:06)  HR: 58 (12 Sep 2021 01:06) (58 - 61)  BP: 182/67 (12 Sep 2021 00:07) (173/73 - 182/67)  BP(mean): --  RR: 18 (12 Sep 2021 01:06) (18 - 26)  SpO2: 93% (12 Sep 2021 01:06) (93% - 96%)    I&O's Detail    I&O's Summary          PHYSICAL EXAM:  General: Appears well developed, well nourished, no acute distress  HEENT: Head: normocephalic, atraumatic  Eyes: Pupils equal and reactive  Neck: Supple, no carotid bruit, no JVD, no HJR  CARDIOVASCULAR: Normal S1 and S2, I/VI syst M > LUSB  LUNGS: Decreased breath sounds bases bilaterally, no rales, rhonchi or wheeze  ABDOMEN: Soft, nontender, non-distended, positive bowel sounds, no mass or bruit  EXTREMITIES: 1+ B/L LE edema, distal pulses WNL  SKIN: Warm and dry with normal turgor  NEURO: Alert & oriented x 3, grossly intact  PSYCH: normal mood and affect    LABS:                        9.1    5.72  )-----------( 243      ( 11 Sep 2021 23:13 )             29.5     09-11    139  |  101  |  60.9<H>  ----------------------------<  250<H>  3.8   |  20.0<L>  |  2.65<H>    Ca    9.0      11 Sep 2021 23:13  Mg     2.6     09-11    TPro  7.2  /  Alb  3.9  /  TBili  0.3<L>  /  DBili  x   /  AST  27  /  ALT  32  /  AlkPhos  136<H>  09-11    CARDIAC MARKERS ( 11 Sep 2021 23:13 )  x     / 0.02 ng/mL / x     / x     / x            Serum Pro-Brain Natriuretic Peptide: 3932 pg/mL (09-11 @ 23:13)      RADIOLOGY & ADDITIONAL STUDIES:    ECG: sinus bradycardia @ 59 bpm, LAE, iRBBB, LVH, PRWP, possible AWMI of JACY, non-specific repolarization abnormality > lateral leads    ECHO (8/18/21):  Normal LV fxn (EF 65-70%), diastolic dysfxn, bioAVR, mild paravalvular AR, mild-moderate MR, mild TR, moderate pulmonary HTN    CARDIAC CATHETERIZATION (6/14/21):  Severe aortic stenosis, minor luminal irregularities    CT Abd/Pelvis 9/12/21:  IMPRESSION:  Mild motion degraded limited exam.  1. Question mild gallbladder wall thickening. Nonspecific haziness in the region of the laurel hepatis. Correlation with LFTs is recommended. Further evaluation with ultrasound is advised to evaluate for underlying cholecystitis.  2. Pulmonary edema at the lung bases and small right pleural effusion.    Assessment and Plan:  In summary, PAULINO LAM is a 85F a/w SOB, orthopnea/PND, LE edema & epigastric pain (troponin negative), acute on chronic diastolic CHF exacerbation, + UTI, h/o severe AS s/p bioprosthetic AVR (TAVR) 7/14/21, normal LV fxn, essentially normal coronary arteries with minor luminal irregularities on cardiac catheterization 6/24/21, NSTEMI 8/13/21 2/2 demand ischemia in the setting of uncontrolled HTN, PVD s/p LE stent 8/13/18, carotid disease, CVA 5/20/21, DM, HTN, HL, CRI, anemia    - Monitor on telemetry  - No evidence of ischemia clinically, no chest pain, atypical epigastric pain likely GI in nature, troponin negative, no acute EKG changes.  Cardiac catheterization 6/14/21 demonstrated severe aortic stenosis with no significant CAD and only minor luminal irregularities noted.  Continue medical management.  - Echocardiogram 8/18/21 demonstrated normal LV fxn (EF 65-70%), diastolic dysfxn, bioAVR, mild paravalvular AR, mild-moderate MR, mild TR, moderate pulmonary HTN.  Repeat Echo pending.  - CT abdomen/pelvis 9/12/21 demonstrated 1. Question mild gallbladder wall thickening. Nonspecific haziness in the region of the laurel hepatis. Correlation with LFTs is recommended. Further evaluation with ultrasound is advised to evaluate for underlying cholecystitis.  2. Pulmonary edema at the lung bases and small right pleural effusion.  - IV diuresis with Lasix 40 mg IV daily for now.  Monitor repeat CXRs, strict I/Os, daily weights, & BUN/Cr closely and titrate PRN.  Replete K>4 & Mg>2.  Patient was not on diuretic therapy as an outpatient and would benefit from an oral loop diuretic on discharge.  Nephrology follow-up given CRI  - Rhythm/hemodynamics stable = continue current doses of Coreg 12.5 bid, Nifedipine XL 90 daily, Hydralazine 100 tid & Clonidine 0.3 tid for now and titrate PRN  - ASA 81 & Plavix 75 daily in place  - Simvastatin 20 daily in place  - Antibiotics per medicine    We will follow with you.  Thank you for allowing me to participate in the care of your patient.      Sincerely,    Júnior Joseph MD

## 2021-09-12 NOTE — H&P ADULT - MUSCULOSKELETAL
negative detailed exam ROM intact/no joint swelling/no joint erythema/no joint warmth/no calf tenderness

## 2021-09-12 NOTE — H&P ADULT - TIME BILLING
86 y/o female with PMH of AS s/p TAVR (07-), CAD, NSTEMI, CKD-V, CVA, DM, HLD, HTN, came to ED complaining of dyspnea and leg swelling as well as dysuria. CT abdomen: pulmonary edema. UA positive. Lasix given in the ED, will continue; Rocephin given-continue. Cardiology consulted. Plan as detailed above. Patient's daughter at bed side informed the plan of care.

## 2021-09-12 NOTE — ED PROVIDER NOTE - CLINICAL SUMMARY MEDICAL DECISION MAKING FREE TEXT BOX
Exertional dyspnea with basilar crackles and CXR consistent with pulmonary edema likely volume overloaded, will start lasix IV. Cardiology consulted for AM. Also with UTI symptoms and positive UA, IV abx initiated but no evidence of sepsis.

## 2021-09-12 NOTE — H&P ADULT - NSHPOUTPATIENTPROVIDERS_GEN_ALL_CORE
PCP Dr. Deepika BRUNER [facility]CP [facility] [phone]         [place] BRUNER  Renal Dr. Sidhu  [phone]  CARD Dr. Anurag Mcgee [phone] PCP Dr. [facility] [phone]         [place] BRUNER  Renal Dr. Sidhu  [phone]  CARD Dr. Anurag Mcgee [phone]  Oncology: Gold

## 2021-09-12 NOTE — CONSULT NOTE ADULT - SUBJECTIVE AND OBJECTIVE BOX
HPI:  85 year old female w  AS s/p TAVR 2021, CAD, NSTEMI, CKD stage V, CVA, DM, HLD, HTN, memory difficulties   presented to ED w daughter c/o dyspnea and leg swelling    pt is SOB at rest and that increases w exertion x several days  +swelling to both legs  +also lower chest / upper abdominal pain below her breasts across RUQ to epigastrium to LUQ, intermittently   does not grade and cannot tell me what increases or decreases pain  +nausea  no fever or chills  +dysuria    In ED /73   HR 60   RR 26   T 97.4   96% RA  lasix 40 mg IV  ceftriaxone 1 g for abn UA  CXR +CM  CT abd + thickened GB, pulm edema w small R pleural effusion        PAST MEDICAL HX  Anemia   Anxiety  AS aortic stenosis   Cervicalgia   CHF (congestive heart failure)   CKD (chronic kidney disease), stage V   Diabetes mellitus   Diabetic nephropathy associated with type 2 diabetes mellitus   History of intravascular stent placement   HLD (hyperlipidemia)  HTN (hypertension)   Memory difficulties   Proteinuria   Status post CVA    Unsteady gait      SURGICAL HX  TAVR transvenous catheter valve replacement 2021  History of Arterial stent placement  History of Neuroplasty Decompression Median Nerve At Carpal Tunnel    SOCIAL HX  nonsmoker      IMMUNIZATION   PFIZER Covid-19 vaccine X 2 DOSES 3/14/21, 21. (12 Sep 2021 04:53)   Hx renal stones x1 not aware of type, no other renal  problems; elevated creatinine on admission.    PAST MEDICAL & SURGICAL HISTORY:  HTN (hypertension)    High cholesterol    DM (diabetes mellitus)    Cardiomegaly    PAD (peripheral artery disease)    H/O cataract    FAMILY HISTORY:  No pertinent family history in first degree relatives    [place]    Social History:Non smoker    MEDICATIONS  (STANDING):  aspirin enteric coated 81 milliGRAM(s) Oral daily  carvedilol 12.5 milliGRAM(s) Oral every 12 hours  cefTRIAXone   IVPB 1000 milliGRAM(s) IV Intermittent every 24 hours  cloNIDine 0.1 milliGRAM(s) Oral three times a day  cloNIDine 0.2 milliGRAM(s) Oral three times a day  clopidogrel Tablet 75 milliGRAM(s) Oral daily  ferrous    sulfate 325 milliGRAM(s) Oral daily  furosemide   Injectable 40 milliGRAM(s) IV Push daily  gabapentin 100 milliGRAM(s) Oral three times a day  heparin   Injectable 5000 Unit(s) SubCutaneous every 8 hours  hydrALAZINE 100 milliGRAM(s) Oral three times a day  loratadine 10 milliGRAM(s) Oral daily  memantine 5 milliGRAM(s) Oral daily  NIFEdipine XL 90 milliGRAM(s) Oral daily  simvastatin 20 milliGRAM(s) Oral at bedtime    MEDICATIONS  (PRN):   Meds reviewed      Vital Signs Last 24 Hrs  T(C): 36.6 (12 Sep 2021 01:06), Max: 36.6 (12 Sep 2021 01:06)  T(F): 97.8 (12 Sep 2021 01:06), Max: 97.8 (12 Sep 2021 01:06)  HR: 65 (12 Sep 2021 07:29) (58 - 65)  BP: 216/88 (12 Sep 2021 07:29) (173/73 - 216/88)  BP(mean): --  RR: 16 (12 Sep 2021 07:29) (16 - 26)  SpO2: 96% (12 Sep 2021 07:29) (93% - 96%)  Daily Height in cm: 154.94 (11 Sep 2021 20:06)    Daily     PHYSICAL EXAM:    GENERAL: appears chronically ill  HEAD: NCAT  EYES: EOMI  NECK: Supple  NERVOUS SYSTEM:  Alert & Oriented X3  CHEST/LUNG: Clear to percussion bilaterally  HEART: Regular rate and rhythm  ABDOMEN: Soft, Nontender, Nondistended; +BS  EXTREMITIES:  edema      LABS:                        9.1    5.72  )-----------( 243      ( 11 Sep 2021 23:13 )             29.5         139  |  101  |  60.9<H>  ----------------------------<  250<H>  3.8   |  20.0<L>  |  2.65<H>    Ca    9.0      11 Sep 2021 23:13  Mg     2.6         TPro  7.2  /  Alb  3.9  /  TBili  0.3<L>  /  DBili  x   /  AST  27  /  ALT  32  /  AlkPhos  136<H>        Urinalysis Basic - ( 11 Sep 2021 23:44 )    Color: Yellow / Appearance: Clear / S.010 / pH: x  Gluc: x / Ketone: Negative  / Bili: Small / Urobili: 4   Blood: x / Protein: 100 / Nitrite: Positive   Leuk Esterase: Small / RBC: 0-2 /HPF / WBC 6-10   Sq Epi: x / Non Sq Epi: Few / Bacteria: Few      Magnesium, Serum: 2.6 mg/dL ( @ 23:13)          RADIOLOGY & ADDITIONAL TESTS:     HPI:  85 year old female w  AS s/p TAVR 2021, CAD, NSTEMI, CKD stage V, CVA, DM, HLD, HTN, memory difficulties   presented to ED w daughter c/o dyspnea and leg swelling found to have UTI on abx   Currently denies AH CP N/V/D + dyspnea    pt is SOB at rest and that increases w exertion x several days  +swelling to both legs  +also lower chest / upper abdominal pain below her breasts across RUQ to epigastrium to LUQ, intermittently   does not grade and cannot tell me what increases or decreases pain  +nausea  no fever or chills  +dysuria    In ED /73   HR 60   RR 26   T 97.4   96% RA  lasix 40 mg IV  ceftriaxone 1 g for abn UA  CXR +CM  CT abd + thickened GB, pulm edema w small R pleural effusion        PAST MEDICAL HX  Anemia   Anxiety  AS aortic stenosis   Cervicalgia   CHF (congestive heart failure)   CKD (chronic kidney disease), stage V   Diabetes mellitus   Diabetic nephropathy associated with type 2 diabetes mellitus   History of intravascular stent placement   HLD (hyperlipidemia)  HTN (hypertension)   Memory difficulties   Proteinuria   Status post CVA    Unsteady gait      SURGICAL HX  TAVR transvenous catheter valve replacement 2021  History of Arterial stent placement  History of Neuroplasty Decompression Median Nerve At Carpal Tunnel    SOCIAL HX  nonsmoker      IMMUNIZATION   PFIZER Covid-19 vaccine X 2 DOSES 3/14/21, 21. (12 Sep 2021 04:53)   Hx renal stones x1 not aware of type, no other renal  problems; elevated creatinine on admission.    PAST MEDICAL & SURGICAL HISTORY:  HTN (hypertension)    High cholesterol    DM (diabetes mellitus)    Cardiomegaly    PAD (peripheral artery disease)    H/O cataract    FAMILY HISTORY:  No pertinent family history in first degree relatives    [place]    Social History:Non smoker    MEDICATIONS  (STANDING):  aspirin enteric coated 81 milliGRAM(s) Oral daily  carvedilol 12.5 milliGRAM(s) Oral every 12 hours  cefTRIAXone   IVPB 1000 milliGRAM(s) IV Intermittent every 24 hours  cloNIDine 0.1 milliGRAM(s) Oral three times a day  cloNIDine 0.2 milliGRAM(s) Oral three times a day  clopidogrel Tablet 75 milliGRAM(s) Oral daily  ferrous    sulfate 325 milliGRAM(s) Oral daily  furosemide   Injectable 40 milliGRAM(s) IV Push daily  gabapentin 100 milliGRAM(s) Oral three times a day  heparin   Injectable 5000 Unit(s) SubCutaneous every 8 hours  hydrALAZINE 100 milliGRAM(s) Oral three times a day  loratadine 10 milliGRAM(s) Oral daily  memantine 5 milliGRAM(s) Oral daily  NIFEdipine XL 90 milliGRAM(s) Oral daily  simvastatin 20 milliGRAM(s) Oral at bedtime    MEDICATIONS  (PRN):   Meds reviewed      Vital Signs Last 24 Hrs  T(C): 36.6 (12 Sep 2021 01:06), Max: 36.6 (12 Sep 2021 01:06)  T(F): 97.8 (12 Sep 2021 01:06), Max: 97.8 (12 Sep 2021 01:06)  HR: 65 (12 Sep 2021 07:29) (58 - 65)  BP: 216/88 (12 Sep 2021 07:29) (173/73 - 216/88)  BP(mean): --  RR: 16 (12 Sep 2021 07:29) (16 - 26)  SpO2: 96% (12 Sep 2021 07:29) (93% - 96%)  Daily Height in cm: 154.94 (11 Sep 2021 20:06)    Daily     PHYSICAL EXAM:    GENERAL: appears chronically ill  HEAD: NCAT  EYES: EOMI  NECK: Supple  NERVOUS SYSTEM:  Alert & Oriented X3  CHEST/LUNG: Clear to percussion bilaterally  HEART: Regular rate and rhythm  ABDOMEN: Soft, Nontender, Nondistended; +BS  EXTREMITIES:  trace LE edema      LABS:                        9.1    5.72  )-----------( 243      ( 11 Sep 2021 23:13 )             29.5         139  |  101  |  60.9<H>  ----------------------------<  250<H>  3.8   |  20.0<L>  |  2.65<H>    Ca    9.0      11 Sep 2021 23:13  Mg     2.6         TPro  7.2  /  Alb  3.9  /  TBili  0.3<L>  /  DBili  x   /  AST  27  /  ALT  32  /  AlkPhos  136<H>  09      Urinalysis Basic - ( 11 Sep 2021 23:44 )    Color: Yellow / Appearance: Clear / S.010 / pH: x  Gluc: x / Ketone: Negative  / Bili: Small / Urobili: 4   Blood: x / Protein: 100 / Nitrite: Positive   Leuk Esterase: Small / RBC: 0-2 /HPF / WBC 6-10   Sq Epi: x / Non Sq Epi: Few / Bacteria: Few      Magnesium, Serum: 2.6 mg/dL ( @ 23:13)          RADIOLOGY & ADDITIONAL TESTS:

## 2021-09-12 NOTE — ED PROVIDER NOTE - OBJECTIVE STATEMENT
85yof w/ hx of TAVR, CAD, recent NSTEMI p/w dyspnea. HAs been having worsenign dyspnea on exertion for several days. No chest pain, fevers, chills, cough. No provoking factor at onset. Also complains of left sided abdominal discomfort and dysuria, had a catheter in during recent hospitalization and was treated for a UTI, had transient improvement but now with recurrent symptoms.

## 2021-09-12 NOTE — CHART NOTE - NSCHARTNOTEFT_GEN_A_CORE
Pt examined in ER   service used, subsequently pts daughter at bedside  Multiple medical comorbidities, admitted with acute decompensated HF  UA suggestive if UTI, continue Ceftriaxone IV  Follow urine cultures  Renal and cardiac evals noted, appreciate recs  Continue Lasix 40 mg IV daily for now. Anticipate progression in setting of necessary volume management to reduce HF exacerbations

## 2021-09-12 NOTE — H&P ADULT - ASSESSMENT
85 year old female w  AS s/p TAVR 07-, CAD, NSTEMI, CKD stage V, CVA, DM, HLD, HTN, memory difficulties presented to ED w daughter c/o dyspnea and leg swelling      #Acute exacerbation of CHF  #s/p TAVR 07- no ECHO post procedure available  off diuretics post op  1. admit to telemetry  2. daily weights  3. I/O  4. s/p lasix 40 mg IV in ED and qd   5. coreg 12.5 mg q 12 hrs w parameters  6. BP meds below  7. ECHO  8. cardiology consult  9. TSH      #HTN  1. clonidine 0.3 mg TID w parameters  2. hydralazine 100 mg TID w parameters  3. nifedepine XL 90 mg qd w parameters      #Lower chest/upper abd pain  details are only that it is intermittent  cardiac vs GB  LFTs normal  1. US RUQ      #UTI  +dysuria, abn UA  1. follow up cx  2. ceftriaxone 1000 mg q 24        ##CAD nonobstructive by cath   #NSTEMI Hx  #HLD  1. asa 81 mg  2. plavix 75 mg  3. simvastatin 20 mg      #CKD V  given that she needs diuresis for CHF  1. daily weight  2. I/O  3. trend Cr  4. Phos in AM    #anemia  1. ferrous 325 mg  2. vitamin C held since we do not have 100 mg      #DM  1. reg diet  2. basakwik 14 units q HS decreased to 10  3. BGM  4, ISS        #Neuropathy from DM  1. gabapentin 100 mg TID      #VTE  IMPROVE VTE Individual Risk Assessment    RISK                                                                Points    [  ] Previous VTE                                                  3    [  ] Thrombophilia                                               2    [  ] Lower limb paralysis                                      2        (unable to hold up >15 seconds)      [  ] Current Cancer                                              2         (within 6 months)    [  ] Immobilization > 24 hrs                                1    [  ] ICU/CCU stay > 24 hours                              1    [ X ] Age > 60                                                      1    IMPROVE VTE Score ______1___    IMPROVE Score 0-1: Low Risk, No VTE prophylaxis required for most patients, encourage ambulation.   IMPROVE Score 2-3: At risk, pharmacologic VTE prophylaxis is indicated for most patients (in the absence of a contraindication)  IMPROVE Score > or = 4: High Risk, pharmacologic VTE prophylaxis is indicated for most patients (in the absence of a contraindication)      VTE sq heparin while on DAPT  med rec done w list provided by daughter and compared to EMR  ECHO  RUQ US  PT eval  inpatient certification    card consult @ 06:17  renal consult @ 06:18        FOLLOW UP  exam

## 2021-09-12 NOTE — H&P ADULT - HISTORY OF PRESENT ILLNESS
85 year old female w  AS s/p TAVR 07-, CAD, NSTEMI,, CKD stage V, CVA, DM, HLD, HTN presented to ED w daughter c/o dyspnea    pt is SOB at rest and that increases w exertion  +swelling to both legs  +also lower chest / upper abdominal pain below her breasts across intermittently   +nausea  no fever or chills  +dysuria    In ED /73   HR 60   RR 26   T 97.4   96% RA          PAST MEDICAL HX  Anemia   Anxiety  AS aortic stenosis   Cervicalgia   CHF (congestive heart failure)   CKD (chronic kidney disease), stage V   Diabetes mellitus   Diabetic nephropathy associated with type 2 diabetes mellitus   History of intravascular stent placement   HLD (hyperlipidemia)  HTN (hypertension)   Memory difficulties   Proteinuria   Status post CVA    Unsteady gait      SURGICAL HX  TAVR transvenous catheter valve replacement 07-  History of Arterial stent placement  History of Neuroplasty Decompression Median Nerve At Carpal Tunnel    SOCIAL HX  nonsmoker      IMMUNIZATION   PFIZER Covid-19 vaccine X 2 DOSES 3/14/21, 4/14/21. 85 year old female w  AS s/p TAVR 07-, CAD, NSTEMI, CKD stage V, CVA, DM, HLD, HTN, memory difficulties presented to ED w daughter c/o dyspnea and leg swelling    pt is SOB at rest and that increases w exertion x several days  +swelling to both legs  +also lower chest / upper abdominal pain below her breasts across RUQ to epigastrium to LUQ, intermittently   does not grade and cannot tell me what increases or decreases pain  +nausea  no fever or chills  +dysuria    In ED /73   HR 60   RR 26   T 97.4   96% RA  lasix 40 mg IV  ceftriaxone 1 g for abn UA  CXR +CM  CT abd + thickened GB, pulm edema w small R pleural effusion        PAST MEDICAL HX  Anemia   Anxiety  AS aortic stenosis   Cervicalgia   CHF (congestive heart failure)   CKD (chronic kidney disease), stage V   Diabetes mellitus   Diabetic nephropathy associated with type 2 diabetes mellitus   History of intravascular stent placement   HLD (hyperlipidemia)  HTN (hypertension)   Memory difficulties   Proteinuria   Status post CVA    Unsteady gait      SURGICAL HX  TAVR transvenous catheter valve replacement 07-  History of Arterial stent placement  History of Neuroplasty Decompression Median Nerve At Carpal Tunnel    SOCIAL HX  nonsmoker      IMMUNIZATION   PFIZER Covid-19 vaccine X 2 DOSES 3/14/21, 4/14/21.

## 2021-09-12 NOTE — CONSULT NOTE ADULT - ASSESSMENT
CKD suspect stage IV  Baseline Cr seems to be 2- 2.5  Has h/o AS s/p TAVR, CAD, CVA, DM, HTN  p/w dyspnea w LE edema  decomponsated HD agree w diureticss  Given Lasix 40 mg IV x1 this AM   CKD suspect stage IV  Yandel cr 2.6  Baseline Cr seems to be 2- 2.5  Has h/o AS s/p TAVR, CAD, CVA, DM, HTN  p/w dyspnea w LE edema  decomponsated HD agree w diureticss  Given Lasix 40 mg IV x1 this AM--> dyspnea better  Likely needs to stay on diuretics, will need to accept a degree of azotemia  Needs strict I&O's   Daily weights    AM labs will follow

## 2021-09-12 NOTE — H&P ADULT - NSHPPHYSICALEXAM_GEN_ALL_CORE
Vital Signs Last 24 Hrs  T(C): 36.6 (12 Sep 2021 01:06), Max: 36.6 (12 Sep 2021 01:06)  T(F): 97.8 (12 Sep 2021 01:06), Max: 97.8 (12 Sep 2021 01:06)  HR: 58 (12 Sep 2021 01:06) (58 - 61)  BP: 182/67 (12 Sep 2021 00:07) (173/73 - 182/67)  BP(mean): --  RR: 18 (12 Sep 2021 01:06) (18 - 26)  SpO2: 93% (12 Sep 2021 01:06) (93% - 96%)      Telemedicine precludes detailed physical examination  pt has to have questions repeated by daughter  speaks in short sentences  states still SOB

## 2021-09-13 ENCOUNTER — APPOINTMENT (OUTPATIENT)
Dept: HEMATOLOGY ONCOLOGY | Facility: CLINIC | Age: 86
End: 2021-09-13

## 2021-09-13 ENCOUNTER — RX RENEWAL (OUTPATIENT)
Age: 86
End: 2021-09-13

## 2021-09-13 LAB
ANION GAP SERPL CALC-SCNC: 17 MMOL/L — SIGNIFICANT CHANGE UP (ref 5–17)
BUN SERPL-MCNC: 53.4 MG/DL — HIGH (ref 8–20)
CALCIUM SERPL-MCNC: 9.3 MG/DL — SIGNIFICANT CHANGE UP (ref 8.6–10.2)
CHLORIDE SERPL-SCNC: 98 MMOL/L — SIGNIFICANT CHANGE UP (ref 98–107)
CO2 SERPL-SCNC: 25 MMOL/L — SIGNIFICANT CHANGE UP (ref 22–29)
COVID-19 SPIKE DOMAIN AB INTERP: POSITIVE
COVID-19 SPIKE DOMAIN ANTIBODY RESULT: 190 U/ML — HIGH
CREAT SERPL-MCNC: 2.54 MG/DL — HIGH (ref 0.5–1.3)
GLUCOSE BLDC GLUCOMTR-MCNC: 217 MG/DL — HIGH (ref 70–99)
GLUCOSE SERPL-MCNC: 240 MG/DL — HIGH (ref 70–99)
HCT VFR BLD CALC: 28.2 % — LOW (ref 34.5–45)
HGB BLD-MCNC: 9 G/DL — LOW (ref 11.5–15.5)
LACTATE SERPL-SCNC: 1.6 MMOL/L — SIGNIFICANT CHANGE UP (ref 0.5–2)
LIDOCAIN IGE QN: 34 U/L — SIGNIFICANT CHANGE UP (ref 22–51)
MCHC RBC-ENTMCNC: 26.1 PG — LOW (ref 27–34)
MCHC RBC-ENTMCNC: 31.9 GM/DL — LOW (ref 32–36)
MCV RBC AUTO: 81.7 FL — SIGNIFICANT CHANGE UP (ref 80–100)
PHOSPHATE SERPL-MCNC: 3.7 MG/DL — SIGNIFICANT CHANGE UP (ref 2.4–4.7)
PLATELET # BLD AUTO: 249 K/UL — SIGNIFICANT CHANGE UP (ref 150–400)
POTASSIUM SERPL-MCNC: 3.3 MMOL/L — LOW (ref 3.5–5.3)
POTASSIUM SERPL-SCNC: 3.3 MMOL/L — LOW (ref 3.5–5.3)
RBC # BLD: 3.45 M/UL — LOW (ref 3.8–5.2)
RBC # FLD: 16.8 % — HIGH (ref 10.3–14.5)
SARS-COV-2 IGG+IGM SERPL QL IA: 190 U/ML — HIGH
SARS-COV-2 IGG+IGM SERPL QL IA: POSITIVE
SODIUM SERPL-SCNC: 140 MMOL/L — SIGNIFICANT CHANGE UP (ref 135–145)
TROPONIN T SERPL-MCNC: 0.04 NG/ML — SIGNIFICANT CHANGE UP (ref 0–0.06)
TSH SERPL-MCNC: 4.16 UIU/ML — SIGNIFICANT CHANGE UP (ref 0.27–4.2)
WBC # BLD: 6.1 K/UL — SIGNIFICANT CHANGE UP (ref 3.8–10.5)
WBC # FLD AUTO: 6.1 K/UL — SIGNIFICANT CHANGE UP (ref 3.8–10.5)

## 2021-09-13 PROCEDURE — 99233 SBSQ HOSP IP/OBS HIGH 50: CPT

## 2021-09-13 PROCEDURE — 93306 TTE W/DOPPLER COMPLETE: CPT | Mod: 26

## 2021-09-13 RX ORDER — DEXTROSE 50 % IN WATER 50 %
12.5 SYRINGE (ML) INTRAVENOUS ONCE
Refills: 0 | Status: DISCONTINUED | OUTPATIENT
Start: 2021-09-13 | End: 2021-09-16

## 2021-09-13 RX ORDER — POTASSIUM CHLORIDE 20 MEQ
40 PACKET (EA) ORAL ONCE
Refills: 0 | Status: COMPLETED | OUTPATIENT
Start: 2021-09-13 | End: 2021-09-13

## 2021-09-13 RX ORDER — DOXAZOSIN MESYLATE 4 MG
1 TABLET ORAL AT BEDTIME
Refills: 0 | Status: DISCONTINUED | OUTPATIENT
Start: 2021-09-13 | End: 2021-09-14

## 2021-09-13 RX ORDER — INSULIN LISPRO 100/ML
VIAL (ML) SUBCUTANEOUS AT BEDTIME
Refills: 0 | Status: DISCONTINUED | OUTPATIENT
Start: 2021-09-13 | End: 2021-09-16

## 2021-09-13 RX ORDER — DEXTROSE 50 % IN WATER 50 %
15 SYRINGE (ML) INTRAVENOUS ONCE
Refills: 0 | Status: DISCONTINUED | OUTPATIENT
Start: 2021-09-13 | End: 2021-09-16

## 2021-09-13 RX ORDER — DEXTROSE 50 % IN WATER 50 %
25 SYRINGE (ML) INTRAVENOUS ONCE
Refills: 0 | Status: DISCONTINUED | OUTPATIENT
Start: 2021-09-13 | End: 2021-09-16

## 2021-09-13 RX ORDER — INSULIN GLARGINE 100 [IU]/ML
15 INJECTION, SOLUTION SUBCUTANEOUS AT BEDTIME
Refills: 0 | Status: DISCONTINUED | OUTPATIENT
Start: 2021-09-13 | End: 2021-09-14

## 2021-09-13 RX ORDER — SODIUM CHLORIDE 9 MG/ML
1000 INJECTION, SOLUTION INTRAVENOUS
Refills: 0 | Status: DISCONTINUED | OUTPATIENT
Start: 2021-09-13 | End: 2021-09-16

## 2021-09-13 RX ORDER — SYRING-NEEDL,DISP,INSUL,0.3 ML 30 GX5/16"
SYRINGE, EMPTY DISPOSABLE MISCELLANEOUS
Qty: 1 | Refills: 0 | Status: DISCONTINUED | COMMUNITY
Start: 2021-05-13 | End: 2021-09-13

## 2021-09-13 RX ORDER — GLUCAGON INJECTION, SOLUTION 0.5 MG/.1ML
1 INJECTION, SOLUTION SUBCUTANEOUS ONCE
Refills: 0 | Status: DISCONTINUED | OUTPATIENT
Start: 2021-09-13 | End: 2021-09-16

## 2021-09-13 RX ORDER — INSULIN LISPRO 100/ML
VIAL (ML) SUBCUTANEOUS
Refills: 0 | Status: DISCONTINUED | OUTPATIENT
Start: 2021-09-13 | End: 2021-09-16

## 2021-09-13 RX ORDER — INSULIN LISPRO 100/ML
5 VIAL (ML) SUBCUTANEOUS
Refills: 0 | Status: DISCONTINUED | OUTPATIENT
Start: 2021-09-13 | End: 2021-09-14

## 2021-09-13 RX ORDER — POTASSIUM CHLORIDE 20 MEQ
20 PACKET (EA) ORAL DAILY
Refills: 0 | Status: DISCONTINUED | OUTPATIENT
Start: 2021-09-14 | End: 2021-09-16

## 2021-09-13 RX ADMIN — CEFTRIAXONE 100 MILLIGRAM(S): 500 INJECTION, POWDER, FOR SOLUTION INTRAMUSCULAR; INTRAVENOUS at 12:24

## 2021-09-13 RX ADMIN — Medication 0.1 MILLIGRAM(S): at 05:25

## 2021-09-13 RX ADMIN — Medication 100 MILLIGRAM(S): at 13:10

## 2021-09-13 RX ADMIN — GABAPENTIN 100 MILLIGRAM(S): 400 CAPSULE ORAL at 21:05

## 2021-09-13 RX ADMIN — CARVEDILOL PHOSPHATE 12.5 MILLIGRAM(S): 80 CAPSULE, EXTENDED RELEASE ORAL at 16:48

## 2021-09-13 RX ADMIN — Medication 0.2 MILLIGRAM(S): at 13:11

## 2021-09-13 RX ADMIN — Medication 81 MILLIGRAM(S): at 12:25

## 2021-09-13 RX ADMIN — GABAPENTIN 100 MILLIGRAM(S): 400 CAPSULE ORAL at 13:11

## 2021-09-13 RX ADMIN — Medication 1 MILLIGRAM(S): at 21:07

## 2021-09-13 RX ADMIN — MEMANTINE HYDROCHLORIDE 5 MILLIGRAM(S): 10 TABLET ORAL at 12:26

## 2021-09-13 RX ADMIN — Medication 100 MILLIGRAM(S): at 21:06

## 2021-09-13 RX ADMIN — GABAPENTIN 100 MILLIGRAM(S): 400 CAPSULE ORAL at 05:32

## 2021-09-13 RX ADMIN — SIMVASTATIN 20 MILLIGRAM(S): 20 TABLET, FILM COATED ORAL at 21:06

## 2021-09-13 RX ADMIN — Medication 100 MILLIGRAM(S): at 05:25

## 2021-09-13 RX ADMIN — INSULIN GLARGINE 15 UNIT(S): 100 INJECTION, SOLUTION SUBCUTANEOUS at 21:06

## 2021-09-13 RX ADMIN — Medication 40 MILLIGRAM(S): at 05:24

## 2021-09-13 RX ADMIN — Medication 0.2 MILLIGRAM(S): at 05:25

## 2021-09-13 RX ADMIN — CARVEDILOL PHOSPHATE 12.5 MILLIGRAM(S): 80 CAPSULE, EXTENDED RELEASE ORAL at 05:25

## 2021-09-13 RX ADMIN — Medication 0.1 MILLIGRAM(S): at 12:27

## 2021-09-13 RX ADMIN — HEPARIN SODIUM 5000 UNIT(S): 5000 INJECTION INTRAVENOUS; SUBCUTANEOUS at 05:24

## 2021-09-13 RX ADMIN — Medication 325 MILLIGRAM(S): at 12:27

## 2021-09-13 RX ADMIN — LORATADINE 10 MILLIGRAM(S): 10 TABLET ORAL at 12:25

## 2021-09-13 RX ADMIN — Medication 90 MILLIGRAM(S): at 05:25

## 2021-09-13 RX ADMIN — Medication 40 MILLIEQUIVALENT(S): at 16:48

## 2021-09-13 RX ADMIN — CLOPIDOGREL BISULFATE 75 MILLIGRAM(S): 75 TABLET, FILM COATED ORAL at 13:11

## 2021-09-13 RX ADMIN — HEPARIN SODIUM 5000 UNIT(S): 5000 INJECTION INTRAVENOUS; SUBCUTANEOUS at 21:06

## 2021-09-13 RX ADMIN — Medication 0.2 MILLIGRAM(S): at 21:05

## 2021-09-13 RX ADMIN — HEPARIN SODIUM 5000 UNIT(S): 5000 INJECTION INTRAVENOUS; SUBCUTANEOUS at 16:48

## 2021-09-13 RX ADMIN — Medication 0.1 MILLIGRAM(S): at 21:05

## 2021-09-13 NOTE — PHYSICAL THERAPY INITIAL EVALUATION ADULT - PERTINENT HX OF CURRENT PROBLEM, REHAB EVAL
85 year old female w  AS s/p TAVR 07-, CAD, NSTEMI, CKD stage V, CVA, DM, HLD, HTN, memory difficulties presented to ED w daughter c/o dyspnea and leg swelling.

## 2021-09-13 NOTE — PHYSICAL THERAPY INITIAL EVALUATION ADULT - ADDITIONAL COMMENTS
pt states she lives with her daughter and family in a 2 story house with 2 steps to enter(no rail) then stays on 1st floor. pt states her daughter lives upstairs. uses RW for amb and has assist for stairs. has HHA 8 hours/day, 7days/week.

## 2021-09-13 NOTE — PROGRESS NOTE ADULT - ASSESSMENT
85 year old female with PMH HTN, Dyslipidemia, T2DM, CAD, AS s/p TAVR, PVD s/p LE stent, CHFpEF presenting with dyspnea, LE swelling and pain. Elevated BNP and Chest X-Ray consistent with CHF. UA (+)    CHFpEF, acute on chronic - decompensated. Mild symptom improvement with IV diuresis  - Fluid restriction, daily weights, I/O  - IV Lasix, Coreg  - TTE  - Cardiology follow up    UTI - afebrile without any leukocytosis  - Ceftriaxone x 3 days  - Follow cultures    HTN - uncontrolled  - Coreg 12.5 tid, Clonidine 0.3 tid- limited by bradycardia  - Hydralazine 100 q8. Nifedipine 90 daily.  - Add doxazosin.    T2DM with hyperglycemia  - BGM with SSI  - Add Lantus 15U and Admelog 5U premeal  - A1c in am    CAD, PAD  - DAPT, Statin, BB    CKD4 -   - Monitor renal functions. May need to have some degree of azotemia    Anemia, normocytic  - Supplements  - Monitor Hgb    Hypokalemia - supplement    VTEp - Heparin  PT evaluation    Disposition - acute ill requiring IV diuretics

## 2021-09-13 NOTE — PROGRESS NOTE ADULT - ASSESSMENT
CKD suspect stage IV  Yandel cr 2.6--> 2.5 improving slowly  Baseline Cr seems to be 2- 2.5  Has h/o AS s/p TAVR, CAD, CVA, DM, HTN  p/w dyspnea w LE edema  decomponsated HD agree w diureticss  Given Lasix 40 mg IV -> dyspnea better would cont  Likely needs to stay on diuretics, will need to accept a degree of azotemia  Cards eval noted  Needs strict I&O's   Daily weights    Anemia 2/2 CKD? needs guiac  - will check iron studies    AM labs will follow   CKD suspect stage IV  Yandel cr 2.6--> 2.5 improving slowly  Baseline Cr seems to be 2- 2.5  Has h/o AS s/p TAVR, CAD, CVA, DM, HTN  p/w dyspnea w LE edema  decomponsated HD agree w diureticss  Given Lasix 40 mg IV -> dyspnea better would cont  Likely needs to stay on diuretics, will need to accept a degree of azotemia  Hypokalemia supplemented  Cards eval noted  Needs strict I&O's   Daily weights    Anemia 2/2 CKD? needs guiac  - will check iron studies    AM labs will follow

## 2021-09-14 LAB
-  AMPICILLIN: SIGNIFICANT CHANGE UP
-  CIPROFLOXACIN: SIGNIFICANT CHANGE UP
-  LEVOFLOXACIN: SIGNIFICANT CHANGE UP
-  NITROFURANTOIN: SIGNIFICANT CHANGE UP
-  TETRACYCLINE: SIGNIFICANT CHANGE UP
-  VANCOMYCIN: SIGNIFICANT CHANGE UP
A1C WITH ESTIMATED AVERAGE GLUCOSE RESULT: 5.5 % — SIGNIFICANT CHANGE UP (ref 4–5.6)
ANION GAP SERPL CALC-SCNC: 16 MMOL/L — SIGNIFICANT CHANGE UP (ref 5–17)
BUN SERPL-MCNC: 57.3 MG/DL — HIGH (ref 8–20)
CALCIUM SERPL-MCNC: 9 MG/DL — SIGNIFICANT CHANGE UP (ref 8.6–10.2)
CHLORIDE SERPL-SCNC: 100 MMOL/L — SIGNIFICANT CHANGE UP (ref 98–107)
CO2 SERPL-SCNC: 26 MMOL/L — SIGNIFICANT CHANGE UP (ref 22–29)
CREAT SERPL-MCNC: 2.98 MG/DL — HIGH (ref 0.5–1.3)
CULTURE RESULTS: SIGNIFICANT CHANGE UP
ESTIMATED AVERAGE GLUCOSE: 111 MG/DL — SIGNIFICANT CHANGE UP (ref 68–114)
FERRITIN SERPL-MCNC: 97 NG/ML — SIGNIFICANT CHANGE UP (ref 15–150)
GLUCOSE BLDC GLUCOMTR-MCNC: 100 MG/DL — HIGH (ref 70–99)
GLUCOSE BLDC GLUCOMTR-MCNC: 143 MG/DL — HIGH (ref 70–99)
GLUCOSE BLDC GLUCOMTR-MCNC: 169 MG/DL — HIGH (ref 70–99)
GLUCOSE BLDC GLUCOMTR-MCNC: 83 MG/DL — SIGNIFICANT CHANGE UP (ref 70–99)
GLUCOSE SERPL-MCNC: 134 MG/DL — HIGH (ref 70–99)
IRON SATN MFR SERPL: 46 UG/DL — SIGNIFICANT CHANGE UP (ref 37–145)
METHOD TYPE: SIGNIFICANT CHANGE UP
NT-PROBNP SERPL-SCNC: 2842 PG/ML — HIGH (ref 0–300)
ORGANISM # SPEC MICROSCOPIC CNT: SIGNIFICANT CHANGE UP
ORGANISM # SPEC MICROSCOPIC CNT: SIGNIFICANT CHANGE UP
POTASSIUM SERPL-MCNC: 3.7 MMOL/L — SIGNIFICANT CHANGE UP (ref 3.5–5.3)
POTASSIUM SERPL-SCNC: 3.7 MMOL/L — SIGNIFICANT CHANGE UP (ref 3.5–5.3)
SODIUM SERPL-SCNC: 142 MMOL/L — SIGNIFICANT CHANGE UP (ref 135–145)
SPECIMEN SOURCE: SIGNIFICANT CHANGE UP

## 2021-09-14 PROCEDURE — 99221 1ST HOSP IP/OBS SF/LOW 40: CPT

## 2021-09-14 PROCEDURE — 99233 SBSQ HOSP IP/OBS HIGH 50: CPT

## 2021-09-14 RX ORDER — LANCETS 33 GAUGE
EACH MISCELLANEOUS
Qty: 1 | Refills: 0 | Status: ACTIVE | COMMUNITY
Start: 2021-05-18 | End: 1900-01-01

## 2021-09-14 RX ORDER — DOXAZOSIN MESYLATE 4 MG
2 TABLET ORAL AT BEDTIME
Refills: 0 | Status: DISCONTINUED | OUTPATIENT
Start: 2021-09-14 | End: 2021-09-14

## 2021-09-14 RX ORDER — ISOSORBIDE MONONITRATE 60 MG/1
30 TABLET, EXTENDED RELEASE ORAL DAILY
Refills: 0 | Status: DISCONTINUED | OUTPATIENT
Start: 2021-09-14 | End: 2021-09-15

## 2021-09-14 RX ORDER — FUROSEMIDE 40 MG
40 TABLET ORAL DAILY
Refills: 0 | Status: DISCONTINUED | OUTPATIENT
Start: 2021-09-14 | End: 2021-09-16

## 2021-09-14 RX ORDER — HYDRALAZINE HCL 50 MG
10 TABLET ORAL ONCE
Refills: 0 | Status: COMPLETED | OUTPATIENT
Start: 2021-09-14 | End: 2021-09-14

## 2021-09-14 RX ADMIN — Medication 40 MILLIGRAM(S): at 04:59

## 2021-09-14 RX ADMIN — Medication 100 MILLIGRAM(S): at 22:02

## 2021-09-14 RX ADMIN — SIMVASTATIN 20 MILLIGRAM(S): 20 TABLET, FILM COATED ORAL at 22:02

## 2021-09-14 RX ADMIN — MEMANTINE HYDROCHLORIDE 5 MILLIGRAM(S): 10 TABLET ORAL at 11:41

## 2021-09-14 RX ADMIN — GABAPENTIN 100 MILLIGRAM(S): 400 CAPSULE ORAL at 04:58

## 2021-09-14 RX ADMIN — Medication 1: at 16:50

## 2021-09-14 RX ADMIN — Medication 40 MILLIGRAM(S): at 09:23

## 2021-09-14 RX ADMIN — Medication 90 MILLIGRAM(S): at 04:58

## 2021-09-14 RX ADMIN — Medication 0.2 MILLIGRAM(S): at 04:58

## 2021-09-14 RX ADMIN — HEPARIN SODIUM 5000 UNIT(S): 5000 INJECTION INTRAVENOUS; SUBCUTANEOUS at 13:32

## 2021-09-14 RX ADMIN — Medication 0.1 MILLIGRAM(S): at 04:58

## 2021-09-14 RX ADMIN — Medication 0.2 MILLIGRAM(S): at 22:02

## 2021-09-14 RX ADMIN — Medication 5 UNIT(S): at 16:49

## 2021-09-14 RX ADMIN — Medication 20 MILLIEQUIVALENT(S): at 11:41

## 2021-09-14 RX ADMIN — Medication 0.2 MILLIGRAM(S): at 13:32

## 2021-09-14 RX ADMIN — GABAPENTIN 100 MILLIGRAM(S): 400 CAPSULE ORAL at 13:32

## 2021-09-14 RX ADMIN — GABAPENTIN 100 MILLIGRAM(S): 400 CAPSULE ORAL at 22:02

## 2021-09-14 RX ADMIN — CLOPIDOGREL BISULFATE 75 MILLIGRAM(S): 75 TABLET, FILM COATED ORAL at 11:41

## 2021-09-14 RX ADMIN — ISOSORBIDE MONONITRATE 30 MILLIGRAM(S): 60 TABLET, EXTENDED RELEASE ORAL at 12:18

## 2021-09-14 RX ADMIN — Medication 10 MILLIGRAM(S): at 02:58

## 2021-09-14 RX ADMIN — HEPARIN SODIUM 5000 UNIT(S): 5000 INJECTION INTRAVENOUS; SUBCUTANEOUS at 22:02

## 2021-09-14 RX ADMIN — Medication 100 MILLIGRAM(S): at 13:32

## 2021-09-14 RX ADMIN — Medication 325 MILLIGRAM(S): at 11:41

## 2021-09-14 RX ADMIN — CEFTRIAXONE 100 MILLIGRAM(S): 500 INJECTION, POWDER, FOR SOLUTION INTRAMUSCULAR; INTRAVENOUS at 09:23

## 2021-09-14 RX ADMIN — Medication 81 MILLIGRAM(S): at 11:41

## 2021-09-14 RX ADMIN — Medication 100 MILLIGRAM(S): at 04:58

## 2021-09-14 RX ADMIN — Medication 0.1 MILLIGRAM(S): at 13:32

## 2021-09-14 RX ADMIN — Medication 0.1 MILLIGRAM(S): at 22:02

## 2021-09-14 RX ADMIN — LORATADINE 10 MILLIGRAM(S): 10 TABLET ORAL at 16:49

## 2021-09-14 RX ADMIN — Medication 5 UNIT(S): at 07:59

## 2021-09-14 NOTE — PROGRESS NOTE ADULT - ASSESSMENT
85 year old female with PMH HTN, Dyslipidemia, T2DM, CAD, AS s/p TAVR, PVD s/p LE stent, CHFpEF presenting with dyspnea, LE swelling and pain. Elevated BNP and Chest X-Ray consistent with CHF. UA (+)    CHFpEF, acute on chronic - decompensated. Mild symptom improvement with IV diuresis  - Fluid restriction, daily weights, I/O  - IV Lasix, Coreg  - TTE  - Cardiology follow up    UTI - afebrile without any leukocytosis  - Ceftriaxone x 3 days  - urine culture with insignificant growth of GPC    HTN - uncontrolled  - Coreg 12.5 tid, Clonidine 0.3 tid- limited by bradycardia  - Hydralazine 100 q8. Nifedipine 90 daily.  - Add Imdur    T2DM with hyperglycemia. Improved glycemic control. A1c 5.5  - BGM with SSI  - Lantus 15U and Admelog 5U premeal; will discontinue to prevent hypoglycemia     CAD, PAD  - DAPT, Statin, BB    CKD4 -   - Monitor renal functions. May need to have some degree of azotemia    Anemia, normocytic  - Supplements  - Monitor Hgb    Hypokalemia - supplement    VTEp - Heparin  PT evaluation    Disposition - still symptomatic

## 2021-09-14 NOTE — CONSULT NOTE ADULT - REASON FOR ADMISSION
Acute CHF exacerbation  TAVR  UTI

## 2021-09-14 NOTE — PROGRESS NOTE ADULT - ASSESSMENT
CKD suspect stage IV  Yandel cr 2.6--> 2.5--> 2.9  Baseline Cr seems to be 2- 2.5  Has h/o AS s/p TAVR, CAD, CVA, DM, HTN  p/w dyspnea w LE edema  decomponsated HD agree w diureticss  On Lasix 40 mg IV -> dyspnea better would cont diuretics but change to PO  If renal function cont to worsen will need diuretic holiday  Likely needs to stay on diuretics, will need to accept a degree of azotemia  Hypokalemia better  Cards celia noted  Needs strict I&O's   Daily weights    Anemia 2/2 CKD? needs guiac  - will check iron studies--> pending    AM labs will follow

## 2021-09-14 NOTE — CONSULT NOTE ADULT - ASSESSMENT
85F with HTN, HLD, DM, CAD, AS s/p TAVR, PVD s/p LE stent, CHF, admitted with decompensated heart failure.     #1 CHF - cardiology following. clinically improving. preserved EF. on diuretics, to need on discharge.   #2 JAIME - nephrology following.   #3 Encounter for palliative care - patient seems to be clinically improving, will follow course.

## 2021-09-14 NOTE — CONSULT NOTE ADULT - SUBJECTIVE AND OBJECTIVE BOX
HPI: 85F with PMH as listed admitted 9/12 with dyspnea and shortness of breath due to decompensated heart failure. Patient is s/p recent TAVR in 7/21.   CXR +CM  CT abd + thickened GB, pulm edema w small R pleural effusion    PERTINENT PMH REVIEWED: Yes     PAST MEDICAL & SURGICAL HISTORY:  Anemia   Anxiety  AS aortic stenosis   Cervicalgia   CHF (congestive heart failure)   CKD (chronic kidney disease), stage V   Diabetes mellitus   Diabetic nephropathy associated with type 2 diabetes mellitus   History of intravascular stent placement   HLD (hyperlipidemia)  HTN (hypertension)   Memory difficulties   Proteinuria   Status post CVA    Unsteady gait    SOCIAL HISTORY:                      Substance history: non smoker                     Admitted from:  home                      Mandaen/spirituality: N/A                     Cultural concerns: none                       Surrogate - daughter Janelle Valera     FAMILY HISTORY:  No pertinent family history in first degree relatives    Allergies    No Known Allergies    ADVANCE DIRECTIVES/TREATMENT PREFERENCES:  Full code, all aggressive measures desired     Baseline ADLs (prior to admission):  Dependent      Karnofsky/Palliative Performance Status Version 2:  30 %  http://npcrc.org/files/news/[email or web address]_performance_scale_[email or web address]    Present Symptoms:     Dyspnea: none   Nausea/Vomiting: No  Anxiety:  No agitation   Depression: unable   Fatigue: Yes   Loss of appetite: unable   Constipation: no     Pain: none currently             Character-            Duration-            Effect-            Factors-            Frequency-            Location-            Severity-    Pain AD Score:  [email or web address]/[email or web address] (press ctrl + left click to view)    Review of Systems: Reviewed             Unable to obtain due to poor mentation   All others negative    MEDICATIONS  (STANDING):  aspirin enteric coated 81 milliGRAM(s) Oral daily  carvedilol 12.5 milliGRAM(s) Oral every 12 hours  cefTRIAXone   IVPB 1000 milliGRAM(s) IV Intermittent every 24 hours  cloNIDine 0.1 milliGRAM(s) Oral three times a day  cloNIDine 0.2 milliGRAM(s) Oral three times a day  clopidogrel Tablet 75 milliGRAM(s) Oral daily  dextrose 40% Gel 15 Gram(s) Oral once  dextrose 5%. 1000 milliLiter(s) (50 mL/Hr) IV Continuous <Continuous>  dextrose 5%. 1000 milliLiter(s) (100 mL/Hr) IV Continuous <Continuous>  dextrose 50% Injectable 25 Gram(s) IV Push once  dextrose 50% Injectable 12.5 Gram(s) IV Push once  dextrose 50% Injectable 25 Gram(s) IV Push once  ferrous    sulfate 325 milliGRAM(s) Oral daily  furosemide    Tablet 40 milliGRAM(s) Oral daily  gabapentin 100 milliGRAM(s) Oral three times a day  glucagon  Injectable 1 milliGRAM(s) IntraMuscular once  heparin   Injectable 5000 Unit(s) SubCutaneous every 8 hours  hydrALAZINE 100 milliGRAM(s) Oral three times a day  insulin glargine Injectable (LANTUS) 15 Unit(s) SubCutaneous at bedtime  insulin lispro (ADMELOG) corrective regimen sliding scale   SubCutaneous three times a day before meals  insulin lispro (ADMELOG) corrective regimen sliding scale   SubCutaneous at bedtime  insulin lispro Injectable (ADMELOG) 5 Unit(s) SubCutaneous three times a day before meals  isosorbide   mononitrate ER Tablet (IMDUR) 30 milliGRAM(s) Oral daily  loratadine 10 milliGRAM(s) Oral daily  memantine 5 milliGRAM(s) Oral daily  NIFEdipine XL 90 milliGRAM(s) Oral daily  potassium chloride    Tablet ER 20 milliEquivalent(s) Oral daily  simvastatin 20 milliGRAM(s) Oral at bedtime    MEDICATIONS  (PRN):    PHYSICAL EXAM:    Vital Signs Last 24 Hrs  T(C): 36.3 (14 Sep 2021 11:50), Max: 37.1 (14 Sep 2021 08:06)  T(F): 97.4 (14 Sep 2021 11:50), Max: 98.8 (14 Sep 2021 08:06)  HR: 59 (14 Sep 2021 13:33) (56 - 60)  BP: 185/61 (14 Sep 2021 13:33) (167/76 - 189/72)  BP(mean): --  RR: 18 (14 Sep 2021 11:50) (18 - 18)  SpO2: 95% (14 Sep 2021 11:50) (95% - 97%)    General: in no acute distress     HEENT: normal      Lungs: comfortable     CV: normal      GI: normal       : normal    MSK: weakness    Neuro: no focal deficits     Skin: no rash    LABS:                      9.0    6.10  )-----------( 249      ( 13 Sep 2021 09:02 )             28.2     09-14    142  |  100  |  57.3<H>  ----------------------------<  134<H>  3.7   |  26.0  |  2.98<H>    Ca    9.0      14 Sep 2021 03:03  Phos  3.7     09-13    I&O's Summary    14 Sep 2021 07:01  -  14 Sep 2021 14:49  --------------------------------------------------------  IN: 410 mL / OUT: 0 mL / NET: 410 mL    RADIOLOGY & ADDITIONAL STUDIES:    < from: CT Abdomen and Pelvis No Cont (09.12.21 @ 01:44) >  IMPRESSION:  Mild motion degraded limited exam.  1. Question mild gallbladder wall thickening. Nonspecific haziness in the region of the laurel hepatis.Correlation with LFTs is recommended. Further evaluation with ultrasound is advised to evaluate for underlying cholecystitis.  2. Pulmonary edema at the lung bases and small right pleural effusion.    --- End of Report ---      NELIA LUA MD; Attending Radiologist  This document has been electronically signed. Sep 12 2021  2:08AM    < end of copied text >    < from: Xray Chest 2 Views PA/Lat (09.11.21 @ 23:30) >      INTERPRETATION:  Chest radiographs    CLINICAL INFORMATION:  Chest pain,  Short of breath.    TECHNIQUE:  Frontal and lateral views of the chest were obtained.    FINDINGS:  No previous examinations are available for review.    The lungs are significant for mild congestive heart failure with cardiomegaly, vascular congestion and interstitial edema    The heart and mediastinum appear intact. Implantable cardiac device noted.    IMPRESSION: mild congestive heart failure with cardiomegaly, vascular congestion and interstitial edema    --- End of Report ---    < end of copied text >    < from: TTE Echo Complete w/o Contrast w/ Doppler (09.13.21 @ 22:23) >    Summary:   1. Left ventricular ejection fraction, by visual estimation, is 70 to 75%.   2. Technically adequate study.   3. Normal global left ventricular systolic function.   4. Mildly increased LV wall thickness.   5. Spectral Doppler shows pseudonormal pattern of left ventricular myocardial filling (Grade II diastolic dysfunction).   6. There is mild concentric left ventricular hypertrophy.   7. Mildly enlarged left atrium.   8. Normal right atrial size.   9.There is no evidence of pericardial effusion.  10. Mild mitral valve regurgitation.  11. Mild thickening of the anterior and posterior mitral valve leaflets.  12. Mild tricuspid regurgitation.  13. Mild aortic regurgitation.  14. TAVR in the aortic position.  15. Mild paravalvular leak seen.  16. Mild pulmonic valve regurgitation.  17. Estimated pulmonary artery systolic pressure is 47.6 mmHg assuming a right atrial pressure of 3 mmHg, which is consistent with mild pulmonary hypertension.    MD Twan Electronically signed on 9/14/2021 at 2:49:19 PM    < end of copied text >

## 2021-09-15 LAB
ANION GAP SERPL CALC-SCNC: 15 MMOL/L — SIGNIFICANT CHANGE UP (ref 5–17)
BUN SERPL-MCNC: 55.5 MG/DL — HIGH (ref 8–20)
CALCIUM SERPL-MCNC: 9.8 MG/DL — SIGNIFICANT CHANGE UP (ref 8.6–10.2)
CHLORIDE SERPL-SCNC: 99 MMOL/L — SIGNIFICANT CHANGE UP (ref 98–107)
CO2 SERPL-SCNC: 28 MMOL/L — SIGNIFICANT CHANGE UP (ref 22–29)
CREAT SERPL-MCNC: 2.58 MG/DL — HIGH (ref 0.5–1.3)
FERRITIN SERPL-MCNC: 104 NG/ML — SIGNIFICANT CHANGE UP (ref 15–150)
GLUCOSE BLDC GLUCOMTR-MCNC: 188 MG/DL — HIGH (ref 70–99)
GLUCOSE BLDC GLUCOMTR-MCNC: 267 MG/DL — HIGH (ref 70–99)
GLUCOSE BLDC GLUCOMTR-MCNC: 269 MG/DL — HIGH (ref 70–99)
GLUCOSE BLDC GLUCOMTR-MCNC: 87 MG/DL — SIGNIFICANT CHANGE UP (ref 70–99)
GLUCOSE SERPL-MCNC: 81 MG/DL — SIGNIFICANT CHANGE UP (ref 70–99)
IRON SATN MFR SERPL: 45 UG/DL — SIGNIFICANT CHANGE UP (ref 37–145)
POTASSIUM SERPL-MCNC: 3.3 MMOL/L — LOW (ref 3.5–5.3)
POTASSIUM SERPL-SCNC: 3.3 MMOL/L — LOW (ref 3.5–5.3)
SODIUM SERPL-SCNC: 142 MMOL/L — SIGNIFICANT CHANGE UP (ref 135–145)

## 2021-09-15 PROCEDURE — 99232 SBSQ HOSP IP/OBS MODERATE 35: CPT

## 2021-09-15 PROCEDURE — 71045 X-RAY EXAM CHEST 1 VIEW: CPT | Mod: 26

## 2021-09-15 RX ORDER — SENNA PLUS 8.6 MG/1
2 TABLET ORAL AT BEDTIME
Refills: 0 | Status: DISCONTINUED | OUTPATIENT
Start: 2021-09-15 | End: 2021-09-16

## 2021-09-15 RX ORDER — HYDRALAZINE HCL 50 MG
10 TABLET ORAL ONCE
Refills: 0 | Status: COMPLETED | OUTPATIENT
Start: 2021-09-15 | End: 2021-09-15

## 2021-09-15 RX ORDER — HYDRALAZINE HCL 50 MG
5 TABLET ORAL ONCE
Refills: 0 | Status: COMPLETED | OUTPATIENT
Start: 2021-09-15 | End: 2021-09-15

## 2021-09-15 RX ORDER — POLYETHYLENE GLYCOL 3350 17 G/17G
17 POWDER, FOR SOLUTION ORAL ONCE
Refills: 0 | Status: COMPLETED | OUTPATIENT
Start: 2021-09-15 | End: 2021-09-15

## 2021-09-15 RX ORDER — ISOSORBIDE MONONITRATE 60 MG/1
60 TABLET, EXTENDED RELEASE ORAL DAILY
Refills: 0 | Status: DISCONTINUED | OUTPATIENT
Start: 2021-09-15 | End: 2021-09-16

## 2021-09-15 RX ORDER — HYDRALAZINE HCL 50 MG
5 TABLET ORAL EVERY 4 HOURS
Refills: 0 | Status: DISCONTINUED | OUTPATIENT
Start: 2021-09-15 | End: 2021-09-16

## 2021-09-15 RX ADMIN — Medication 100 MILLIGRAM(S): at 05:39

## 2021-09-15 RX ADMIN — Medication 0.2 MILLIGRAM(S): at 23:02

## 2021-09-15 RX ADMIN — Medication 3: at 13:33

## 2021-09-15 RX ADMIN — Medication 81 MILLIGRAM(S): at 09:14

## 2021-09-15 RX ADMIN — GABAPENTIN 100 MILLIGRAM(S): 400 CAPSULE ORAL at 14:19

## 2021-09-15 RX ADMIN — Medication 1: at 23:04

## 2021-09-15 RX ADMIN — HEPARIN SODIUM 5000 UNIT(S): 5000 INJECTION INTRAVENOUS; SUBCUTANEOUS at 23:02

## 2021-09-15 RX ADMIN — Medication 100 MILLIGRAM(S): at 14:17

## 2021-09-15 RX ADMIN — Medication 0.1 MILLIGRAM(S): at 14:16

## 2021-09-15 RX ADMIN — Medication 5 MILLIGRAM(S): at 02:27

## 2021-09-15 RX ADMIN — Medication 0.2 MILLIGRAM(S): at 05:39

## 2021-09-15 RX ADMIN — Medication 10 MILLIGRAM(S): at 00:36

## 2021-09-15 RX ADMIN — CEFTRIAXONE 100 MILLIGRAM(S): 500 INJECTION, POWDER, FOR SOLUTION INTRAMUSCULAR; INTRAVENOUS at 09:15

## 2021-09-15 RX ADMIN — MEMANTINE HYDROCHLORIDE 5 MILLIGRAM(S): 10 TABLET ORAL at 09:15

## 2021-09-15 RX ADMIN — Medication 90 MILLIGRAM(S): at 05:39

## 2021-09-15 RX ADMIN — ISOSORBIDE MONONITRATE 60 MILLIGRAM(S): 60 TABLET, EXTENDED RELEASE ORAL at 11:38

## 2021-09-15 RX ADMIN — Medication 0.1 MILLIGRAM(S): at 05:39

## 2021-09-15 RX ADMIN — Medication 325 MILLIGRAM(S): at 09:15

## 2021-09-15 RX ADMIN — Medication 40 MILLIGRAM(S): at 05:40

## 2021-09-15 RX ADMIN — SIMVASTATIN 20 MILLIGRAM(S): 20 TABLET, FILM COATED ORAL at 23:02

## 2021-09-15 RX ADMIN — GABAPENTIN 100 MILLIGRAM(S): 400 CAPSULE ORAL at 05:39

## 2021-09-15 RX ADMIN — Medication 3: at 17:38

## 2021-09-15 RX ADMIN — Medication 0.1 MILLIGRAM(S): at 23:02

## 2021-09-15 RX ADMIN — HEPARIN SODIUM 5000 UNIT(S): 5000 INJECTION INTRAVENOUS; SUBCUTANEOUS at 14:16

## 2021-09-15 RX ADMIN — CARVEDILOL PHOSPHATE 12.5 MILLIGRAM(S): 80 CAPSULE, EXTENDED RELEASE ORAL at 17:41

## 2021-09-15 RX ADMIN — CLOPIDOGREL BISULFATE 75 MILLIGRAM(S): 75 TABLET, FILM COATED ORAL at 09:15

## 2021-09-15 RX ADMIN — Medication 100 MILLIGRAM(S): at 23:02

## 2021-09-15 RX ADMIN — Medication 20 MILLIEQUIVALENT(S): at 09:15

## 2021-09-15 RX ADMIN — GABAPENTIN 100 MILLIGRAM(S): 400 CAPSULE ORAL at 23:02

## 2021-09-15 RX ADMIN — LORATADINE 10 MILLIGRAM(S): 10 TABLET ORAL at 09:15

## 2021-09-15 RX ADMIN — HEPARIN SODIUM 5000 UNIT(S): 5000 INJECTION INTRAVENOUS; SUBCUTANEOUS at 05:40

## 2021-09-15 RX ADMIN — SENNA PLUS 2 TABLET(S): 8.6 TABLET ORAL at 23:02

## 2021-09-15 RX ADMIN — Medication 0.2 MILLIGRAM(S): at 14:16

## 2021-09-15 RX ADMIN — POLYETHYLENE GLYCOL 3350 17 GRAM(S): 17 POWDER, FOR SOLUTION ORAL at 11:38

## 2021-09-15 NOTE — PROGRESS NOTE ADULT - ASSESSMENT
85F with HTN, HLD, DM, CAD, AS s/p TAVR, PVD s/p LE stent, CHF, admitted with decompensated heart failure.     #1 CHF - cardiology following. clinically improving. preserved EF. on diuretics, to need on discharge.   #2 JAIME - nephrology following.   #3 Encounter for palliative care - doing better, will continue to follow course.  85F with HTN, HLD, DM, CAD, AS s/p TAVR, PVD s/p LE stent, CHF, admitted with decompensated heart failure.     #1 CHF - cardiology following. clinically improving. preserved EF. on diuretics, to need on discharge.   #2 AJIME - nephrology following.   #3 Constipation - added medications   #4 Encounter for palliative care - doing better, will continue to follow course.

## 2021-09-15 NOTE — PROGRESS NOTE ADULT - ASSESSMENT
85 year old female with PMH HTN, Dyslipidemia, ckd stage 3-4,. T2DM, CAD, AS s/p TAVR, PVD s/p LE stent, CHFpEF presenting with dyspnea, LE swelling and pain. Elevated BNP and Chest X-Ray consistent with CHF. UA (+)    CHFpEF, acute on chronic - decompensated. improved  - cardio signed off  - c.w lasix     UTI - afebrile without any leukocytosis  - Ceftriaxone x 3 days  - urine culture with insignificant growth of GPC    HTN - uncontrolled  - Coreg 12.5 tid, Clonidine 0.3 tid- limited by bradycardia  - Hydralazine 100 q8. Nifedipine 90 daily.  - imdur increased    T2DM with hyperglycemia. Improved glycemic control. A1c 5.5  - stable     CAD, PAD  - DAPT, Statin, BB    CKD4 -   - Monitor renal functions. May need to have some degree of azotemia  advised to watch salt intake    Anemia, normocytic  - Supplements  - Monitor Hgb    Hypokalemia - supplement    pt consult pending  erika curtis tomorrow

## 2021-09-15 NOTE — CHART NOTE - NSCHARTNOTEFT_GEN_A_CORE
Patient with /69 HR 57  Patient asymptomatic, no complaints   All other VSS   Hydralazine 10mg IVP x 1 given earlier in the shift   Repeat BP still elevated 196/75 with HR 57  Additional Hydralazine 5mg IVP x 1   Still asymptomatic   Patient due for PO BP medications in AM - likely need to be titrated   RN to notify with any acute changes

## 2021-09-15 NOTE — PROGRESS NOTE ADULT - ASSESSMENT
CKD(IV): L renal atrophy  No renal obstruction seen on CT scan  - avoid potential nephrotoxins  - cont diuretics  - monitor labs

## 2021-09-16 ENCOUNTER — TRANSCRIPTION ENCOUNTER (OUTPATIENT)
Age: 86
End: 2021-09-16

## 2021-09-16 VITALS
TEMPERATURE: 97 F | RESPIRATION RATE: 18 BRPM | SYSTOLIC BLOOD PRESSURE: 107 MMHG | OXYGEN SATURATION: 95 % | DIASTOLIC BLOOD PRESSURE: 57 MMHG | HEART RATE: 60 BPM

## 2021-09-16 LAB
ALBUMIN SERPL ELPH-MCNC: 3.8 G/DL — SIGNIFICANT CHANGE UP (ref 3.3–5.2)
ALP SERPL-CCNC: 93 U/L — SIGNIFICANT CHANGE UP (ref 40–120)
ALT FLD-CCNC: 14 U/L — SIGNIFICANT CHANGE UP
ANION GAP SERPL CALC-SCNC: 15 MMOL/L — SIGNIFICANT CHANGE UP (ref 5–17)
AST SERPL-CCNC: 14 U/L — SIGNIFICANT CHANGE UP
BILIRUB SERPL-MCNC: <0.2 MG/DL — LOW (ref 0.4–2)
BUN SERPL-MCNC: 59.8 MG/DL — HIGH (ref 8–20)
CALCIUM SERPL-MCNC: 9.3 MG/DL — SIGNIFICANT CHANGE UP (ref 8.6–10.2)
CHLORIDE SERPL-SCNC: 96 MMOL/L — LOW (ref 98–107)
CO2 SERPL-SCNC: 26 MMOL/L — SIGNIFICANT CHANGE UP (ref 22–29)
CREAT SERPL-MCNC: 2.64 MG/DL — HIGH (ref 0.5–1.3)
GLUCOSE BLDC GLUCOMTR-MCNC: 214 MG/DL — HIGH (ref 70–99)
GLUCOSE BLDC GLUCOMTR-MCNC: 221 MG/DL — HIGH (ref 70–99)
GLUCOSE SERPL-MCNC: 208 MG/DL — HIGH (ref 70–99)
MAGNESIUM SERPL-MCNC: 2.2 MG/DL — SIGNIFICANT CHANGE UP (ref 1.6–2.6)
POTASSIUM SERPL-MCNC: 3.9 MMOL/L — SIGNIFICANT CHANGE UP (ref 3.5–5.3)
POTASSIUM SERPL-SCNC: 3.9 MMOL/L — SIGNIFICANT CHANGE UP (ref 3.5–5.3)
PROT SERPL-MCNC: 6.9 G/DL — SIGNIFICANT CHANGE UP (ref 6.6–8.7)
SODIUM SERPL-SCNC: 137 MMOL/L — SIGNIFICANT CHANGE UP (ref 135–145)

## 2021-09-16 PROCEDURE — 74176 CT ABD & PELVIS W/O CONTRAST: CPT | Mod: MG

## 2021-09-16 PROCEDURE — 80048 BASIC METABOLIC PNL TOTAL CA: CPT

## 2021-09-16 PROCEDURE — T1013: CPT

## 2021-09-16 PROCEDURE — 71046 X-RAY EXAM CHEST 2 VIEWS: CPT

## 2021-09-16 PROCEDURE — 84100 ASSAY OF PHOSPHORUS: CPT

## 2021-09-16 PROCEDURE — 84443 ASSAY THYROID STIM HORMONE: CPT

## 2021-09-16 PROCEDURE — 36415 COLL VENOUS BLD VENIPUNCTURE: CPT

## 2021-09-16 PROCEDURE — 83036 HEMOGLOBIN GLYCOSYLATED A1C: CPT

## 2021-09-16 PROCEDURE — G1004: CPT

## 2021-09-16 PROCEDURE — 96375 TX/PRO/DX INJ NEW DRUG ADDON: CPT

## 2021-09-16 PROCEDURE — 83735 ASSAY OF MAGNESIUM: CPT

## 2021-09-16 PROCEDURE — 76705 ECHO EXAM OF ABDOMEN: CPT

## 2021-09-16 PROCEDURE — 80053 COMPREHEN METABOLIC PANEL: CPT

## 2021-09-16 PROCEDURE — 96374 THER/PROPH/DIAG INJ IV PUSH: CPT

## 2021-09-16 PROCEDURE — 83690 ASSAY OF LIPASE: CPT

## 2021-09-16 PROCEDURE — 97163 PT EVAL HIGH COMPLEX 45 MIN: CPT

## 2021-09-16 PROCEDURE — 84484 ASSAY OF TROPONIN QUANT: CPT

## 2021-09-16 PROCEDURE — 87186 SC STD MICRODIL/AGAR DIL: CPT

## 2021-09-16 PROCEDURE — 83540 ASSAY OF IRON: CPT

## 2021-09-16 PROCEDURE — 99239 HOSP IP/OBS DSCHRG MGMT >30: CPT

## 2021-09-16 PROCEDURE — 93005 ELECTROCARDIOGRAM TRACING: CPT

## 2021-09-16 PROCEDURE — 83605 ASSAY OF LACTIC ACID: CPT

## 2021-09-16 PROCEDURE — 83880 ASSAY OF NATRIURETIC PEPTIDE: CPT

## 2021-09-16 PROCEDURE — 93306 TTE W/DOPPLER COMPLETE: CPT

## 2021-09-16 PROCEDURE — 86769 SARS-COV-2 COVID-19 ANTIBODY: CPT

## 2021-09-16 PROCEDURE — 99285 EMERGENCY DEPT VISIT HI MDM: CPT | Mod: 25

## 2021-09-16 PROCEDURE — 85027 COMPLETE CBC AUTOMATED: CPT

## 2021-09-16 PROCEDURE — 71045 X-RAY EXAM CHEST 1 VIEW: CPT

## 2021-09-16 PROCEDURE — 82728 ASSAY OF FERRITIN: CPT

## 2021-09-16 PROCEDURE — 82962 GLUCOSE BLOOD TEST: CPT

## 2021-09-16 RX ORDER — SITAGLIPTIN 50 MG/1
1 TABLET, FILM COATED ORAL
Qty: 0 | Refills: 0 | DISCHARGE

## 2021-09-16 RX ORDER — NIFEDIPINE 30 MG
1 TABLET, EXTENDED RELEASE 24 HR ORAL
Qty: 30 | Refills: 0
Start: 2021-09-16 | End: 2021-10-15

## 2021-09-16 RX ORDER — ISOSORBIDE MONONITRATE 60 MG/1
120 TABLET, EXTENDED RELEASE ORAL DAILY
Refills: 0 | Status: DISCONTINUED | OUTPATIENT
Start: 2021-09-16 | End: 2021-09-16

## 2021-09-16 RX ORDER — ISOSORBIDE MONONITRATE 60 MG/1
1 TABLET, EXTENDED RELEASE ORAL
Qty: 30 | Refills: 0
Start: 2021-09-16 | End: 2021-10-15

## 2021-09-16 RX ORDER — HYDRALAZINE HCL 50 MG
10 TABLET ORAL ONCE
Refills: 0 | Status: COMPLETED | OUTPATIENT
Start: 2021-09-16 | End: 2021-09-16

## 2021-09-16 RX ORDER — NIFEDIPINE 30 MG
1 TABLET, EXTENDED RELEASE 24 HR ORAL
Qty: 0 | Refills: 0 | DISCHARGE

## 2021-09-16 RX ORDER — POLYETHYLENE GLYCOL 3350 17 G/17G
17 POWDER, FOR SOLUTION ORAL DAILY
Refills: 0 | Status: DISCONTINUED | OUTPATIENT
Start: 2021-09-16 | End: 2021-09-16

## 2021-09-16 RX ORDER — FUROSEMIDE 40 MG
1 TABLET ORAL
Qty: 30 | Refills: 0
Start: 2021-09-16 | End: 2021-10-15

## 2021-09-16 RX ORDER — MULTIVIT WITH MIN/MFOLATE/K2 340-15/3 G
1 POWDER (GRAM) ORAL ONCE
Refills: 0 | Status: COMPLETED | OUTPATIENT
Start: 2021-09-16 | End: 2021-09-16

## 2021-09-16 RX ORDER — LEVOCETIRIZINE DIHYDROCHLORIDE 0.5 MG/ML
1 SOLUTION ORAL
Qty: 0 | Refills: 0 | DISCHARGE

## 2021-09-16 RX ORDER — CLOPIDOGREL BISULFATE 75 MG/1
1 TABLET, FILM COATED ORAL
Qty: 0 | Refills: 0 | DISCHARGE
Start: 2021-09-16

## 2021-09-16 RX ADMIN — HEPARIN SODIUM 5000 UNIT(S): 5000 INJECTION INTRAVENOUS; SUBCUTANEOUS at 12:56

## 2021-09-16 RX ADMIN — Medication 325 MILLIGRAM(S): at 10:02

## 2021-09-16 RX ADMIN — Medication 0.1 MILLIGRAM(S): at 06:16

## 2021-09-16 RX ADMIN — CLOPIDOGREL BISULFATE 75 MILLIGRAM(S): 75 TABLET, FILM COATED ORAL at 10:03

## 2021-09-16 RX ADMIN — GABAPENTIN 100 MILLIGRAM(S): 400 CAPSULE ORAL at 12:56

## 2021-09-16 RX ADMIN — Medication 100 MILLIGRAM(S): at 06:16

## 2021-09-16 RX ADMIN — Medication 20 MILLIEQUIVALENT(S): at 10:03

## 2021-09-16 RX ADMIN — POLYETHYLENE GLYCOL 3350 17 GRAM(S): 17 POWDER, FOR SOLUTION ORAL at 12:54

## 2021-09-16 RX ADMIN — Medication 90 MILLIGRAM(S): at 06:16

## 2021-09-16 RX ADMIN — Medication 100 MILLIGRAM(S): at 12:55

## 2021-09-16 RX ADMIN — Medication 40 MILLIGRAM(S): at 06:16

## 2021-09-16 RX ADMIN — Medication 0.2 MILLIGRAM(S): at 06:16

## 2021-09-16 RX ADMIN — Medication 2: at 12:52

## 2021-09-16 RX ADMIN — MEMANTINE HYDROCHLORIDE 5 MILLIGRAM(S): 10 TABLET ORAL at 10:03

## 2021-09-16 RX ADMIN — ISOSORBIDE MONONITRATE 120 MILLIGRAM(S): 60 TABLET, EXTENDED RELEASE ORAL at 10:02

## 2021-09-16 RX ADMIN — CEFTRIAXONE 100 MILLIGRAM(S): 500 INJECTION, POWDER, FOR SOLUTION INTRAMUSCULAR; INTRAVENOUS at 10:01

## 2021-09-16 RX ADMIN — CARVEDILOL PHOSPHATE 12.5 MILLIGRAM(S): 80 CAPSULE, EXTENDED RELEASE ORAL at 06:16

## 2021-09-16 RX ADMIN — LORATADINE 10 MILLIGRAM(S): 10 TABLET ORAL at 10:03

## 2021-09-16 RX ADMIN — HEPARIN SODIUM 5000 UNIT(S): 5000 INJECTION INTRAVENOUS; SUBCUTANEOUS at 06:16

## 2021-09-16 RX ADMIN — Medication 81 MILLIGRAM(S): at 10:02

## 2021-09-16 RX ADMIN — Medication 0.1 MILLIGRAM(S): at 12:56

## 2021-09-16 RX ADMIN — Medication 1 BOTTLE: at 12:53

## 2021-09-16 RX ADMIN — Medication 2: at 08:38

## 2021-09-16 RX ADMIN — Medication 0.2 MILLIGRAM(S): at 12:56

## 2021-09-16 RX ADMIN — GABAPENTIN 100 MILLIGRAM(S): 400 CAPSULE ORAL at 06:16

## 2021-09-16 RX ADMIN — Medication 5 MILLIGRAM(S): at 08:39

## 2021-09-16 NOTE — DISCHARGE NOTE PROVIDER - NSDCMRMEDTOKEN_GEN_ALL_CORE_FT
aspirin 81 mg oral delayed release tablet: 1 tab(s) orally once a day  Basaglar KwikPen 100 units/mL subcutaneous solution: 14 unit(s) subcutaneous once a day (at bedtime)  carvedilol 12.5 mg oral tablet: 1 tab(s) orally 2 times a day  cloNIDine 0.3 mg oral tablet: 1 tab(s) orally 3 times a day   clopidogrel 75 mg oral tablet: 1 tab(s) orally once a day  ferrous sulfate (as elemental iron) 45 mg oral tablet, extended release: 1 tab(s) orally once a day  furosemide 40 mg oral tablet: 1 tab(s) orally once a day  gabapentin 100 mg oral capsule: 1 cap(s) orally 3 times a day  hydrALAZINE 100 mg oral tablet: 1 tab(s) orally 3 times a day  isosorbide mononitrate 120 mg oral tablet, extended release: 1 tab(s) orally once a day  memantine 5 mg oral tablet: 1 tab(s) orally once a day  NIFEdipine 90 mg oral tablet, extended release: 1 tab(s) orally once a day  simvastatin 20 mg oral tablet: 1 tab(s) orally once a day (at bedtime)  Vitamin C 100 mg oral tablet: 1 tab(s) orally once a day

## 2021-09-16 NOTE — PROGRESS NOTE ADULT - SUBJECTIVE AND OBJECTIVE BOX
[facility], [facility]                                          [address] , [address], [place] [address]                                               PHONE: [phone]    FAX: [phone] [address], [address], [place] [address]                                       PHONE: [phone]    FAX: [phone]  *******************************************************************************    Overnight events/Subjective Assessment: no acute CV events overnight. Patient states that she is feeling well and looking forward to going home.     INTERPRETATION OF TELEMETRY (personally reviewed): all sinus    No Known Allergies    MEDICATIONS  (STANDING):  aspirin enteric coated 81 milliGRAM(s) Oral daily  carvedilol 12.5 milliGRAM(s) Oral every 12 hours  cefTRIAXone   IVPB 1000 milliGRAM(s) IV Intermittent every 24 hours  cloNIDine 0.1 milliGRAM(s) Oral three times a day  cloNIDine 0.2 milliGRAM(s) Oral three times a day  clopidogrel Tablet 75 milliGRAM(s) Oral daily  dextrose 40% Gel 15 Gram(s) Oral once  dextrose 5%. 1000 milliLiter(s) (50 mL/Hr) IV Continuous <Continuous>  dextrose 5%. 1000 milliLiter(s) (100 mL/Hr) IV Continuous <Continuous>  dextrose 50% Injectable 25 Gram(s) IV Push once  dextrose 50% Injectable 12.5 Gram(s) IV Push once  dextrose 50% Injectable 25 Gram(s) IV Push once  ferrous    sulfate 325 milliGRAM(s) Oral daily  furosemide    Tablet 40 milliGRAM(s) Oral daily  gabapentin 100 milliGRAM(s) Oral three times a day  glucagon  Injectable 1 milliGRAM(s) IntraMuscular once  heparin   Injectable 5000 Unit(s) SubCutaneous every 8 hours  hydrALAZINE 100 milliGRAM(s) Oral three times a day  insulin lispro (ADMELOG) corrective regimen sliding scale   SubCutaneous three times a day before meals  insulin lispro (ADMELOG) corrective regimen sliding scale   SubCutaneous at bedtime  isosorbide   mononitrate ER Tablet (IMDUR) 60 milliGRAM(s) Oral daily  loratadine 10 milliGRAM(s) Oral daily  memantine 5 milliGRAM(s) Oral daily  NIFEdipine XL 90 milliGRAM(s) Oral daily  potassium chloride    Tablet ER 20 milliEquivalent(s) Oral daily  senna 2 Tablet(s) Oral at bedtime  simvastatin 20 milliGRAM(s) Oral at bedtime    MEDICATIONS  (PRN):  hydrALAZINE Injectable 5 milliGRAM(s) IV Push every 4 hours PRN for sbp above 170 mmhg      Vital Signs Last 24 Hrs  T(C): 36.3 (15 Sep 2021 09:07), Max: 37.6 (14 Sep 2021 21:32)  T(F): 97.4 (15 Sep 2021 09:07), Max: 99.7 (14 Sep 2021 21:32)  HR: 57 (15 Sep 2021 11:41) (53 - 59)  BP: 163/73 (15 Sep 2021 11:41) (162/61 - 196/75)  RR: 18 (15 Sep 2021 09:07) (18 - 18)  SpO2: 95% (15 Sep 2021 09:07) (94% - 97%)    I&O's Detail    14 Sep 2021 07:01  -  15 Sep 2021 07:00  --------------------------------------------------------  IN:    IV PiggyBack: 50 mL    Oral Fluid: 760 mL  Total IN: 810 mL    OUT:    Voided (mL): 300 mL  Total OUT: 300 mL    Total NET: 510 mL        I&O's Summary    14 Sep 2021 07:01  -  15 Sep 2021 07:00  --------------------------------------------------------  IN: 810 mL / OUT: 300 mL / NET: 510 mL      PHYSICAL EXAM:  General: Appears well developed, well nourished, no acute distress  HEENT: Head: normocephalic, atraumatic  Eyes: Pupils equal and reactive  Neck: Supple, no carotid bruit, no JVD, no HJR  CARDIOVASCULAR: Normal S1 and S2, no murmur, rub, or gallop  LUNGS: Clear to auscultation bilaterally, no rales, rhonchi or wheeze  ABDOMEN: Soft, nontender, non-distended, positive bowel sounds, no mass or bruit  EXTREMITIES: No edema, distal pulses WNL  SKIN: Warm and dry with normal turgor  NEURO: Alert & oriented x 3, grossly intact  PSYCH: normal mood and affect        LABS:    09-15    142  |  99  |  55.5<H>  ----------------------------<  81  3.3<L>   |  28.0  |  2.58<H>    Ca    9.8      15 Sep 2021 08:15    Serum Pro-Brain Natriuretic Peptide: 2842 pg/mL (09-14 @ 03:03)  Serum Pro-Brain Natriuretic Peptide: 3932 pg/mL (09-11 @ 23:13)  serum  Lipids:     Thyroid Stimulating Hormone, Serum: 4.16 uIU/mL (09-13 @ 09:01)      RADIOLOGY & ADDITIONAL STUDIES REVIEWED      ASSESSMENT AND PLAN:  In summary, PAULINO LAM is a 85F a/w SOB, orthopnea/PND, LE edema & epigastric pain (troponin negative), acute on chronic diastolic CHF exacerbation, + UTI, h/o severe AS s/p bioprosthetic AVR (TAVR) 7/14/21, normal LV fxn, essentially normal coronary arteries with minor luminal irregularities on cardiac catheterization 6/24/21, NSTEMI 8/13/21 2/2 demand ischemia in the setting of uncontrolled HTN, PVD s/p LE stent 8/13/18, carotid disease, CVA 5/20/21, DM, HTN, HL, CRI, anemia    - Telemetry reviewed by me: all sinus/sinus bradycardia; no need for continuous telemetry monitoring.   - No evidence of ischemia clinically, no chest pain, atypical epigastric pain likely GI in nature, troponin negative, no acute EKG changes.  Cardiac catheterization 6/14/21 demonstrated severe aortic stenosis with no significant CAD and only minor luminal irregularities noted.  Continue medical management.  - Echocardiogram 8/18/21reviewed: normal LV fxn (EF 65-70%), diastolic dysfxn, bioAVR, mild paravalvular AR, mild-moderate MR, mild TR, moderate pulmonary HTN.  Repeat Echo pending.  - CT abdomen/pelvis 9/12/21 demonstrated 1. Question mild gallbladder wall thickening. Nonspecific haziness in the region of the laurel hepatis. Correlation with LFTs is recommended. Further evaluation with ultrasound is advised to evaluate for underlying cholecystitis. 2. Pulmonary edema at the lung bases and small right pleural effusion.  - Acute on chronic diastolic HF: marked improvement post IV diuresis.  No on lasix 40 mg PO daily. Patient was not on diuretic therapy as an outpatient and would benefit from an oral loop diuretic on discharge.  Nephrology follow-up given CRI  - Rhythm/hemodynamics stable = continue current doses of Coreg 12.5 bid, Nifedipine XL 90 daily, Hydralazine 100 tid & Clonidine 0.3 tid for now and titrate PRN  - ASA 81 & Plavix 75 daily in place  - Simvastatin 20 daily in place  - Antibiotics per medicine for UTI  - Discharge planning per primary team.   - No further inpatient cardiovascular workup indicated.  Early outpatient follow up with [facility].      Odilon Moseley M.D.
  HOSPITALIST PROGRESS NOTE    PAULINO LAM  [id number]  85yFemale    Patient is a 85y old  Female who presents with a chief complaint of Acute CHF exacerbation  TAVR  UTI (13 Sep 2021 11:08)      SUBJECTIVE:   Chart reviewed since admission.  Patient seen and examined at bedside for CHF, UTI, HTN, CKD4  Feels slightly better. Dyspnea improved.   Denies any palpitations now, chest, pain, cough, fever or chills      OBJECTIVE:  Vital Signs Last 24 Hrs  T(C): 36.4 (13 Sep 2021 16:39), Max: 36.8 (13 Sep 2021 01:41)  T(F): 97.6 (13 Sep 2021 16:39), Max: 98.3 (13 Sep 2021 12:18)  HR: 58 (13 Sep 2021 16:39) (57 - 67)  BP: 188/71 (13 Sep 2021 16:39) (166/64 - 188/71)   RR: 18 (13 Sep 2021 16:39) (18 - 18)  SpO2: 95% (13 Sep 2021 16:39) (95% - 96%)    PHYSICAL EXAMINATION  General: Elderly female lying in bed, NAD  HEENT:  No supplemental O2  NECK:  supple  CVS: regular rate and rhythm S1 S2  RESP:  bilateral fine crackles  lower half lung fields  GI:  Soft nondistended nontender BS+  : No suprapubic tenderness  MSK:  No edema. FROM  CNS:  AAOX3. Moves all extremities, sensation grossly intact  INTEG:  warm dry skin  PSYCH:  Fair mood    MONITOR:  CAPILLARY BLOOD GLUCOSE            I&O's Summary                          9.0    6.10  )-----------( 249      ( 13 Sep 2021 09:02 )             28.2       09-13    140  |  98  |  53.4<H>  ----------------------------<  240<H>  3.3<L>   |  25.0  |  2.54<H>    Ca    9.3      13 Sep 2021 09:01  Phos  3.7     09-13  Mg     2.6     09-11    TPro  7.2  /  Alb  3.9  /  TBili  0.3<L>  /  DBili  x   /  AST  27  /  ALT  32  /  AlkPhos  136<H>  09-11    CARDIAC MARKERS ( 13 Sep 2021 09:01 )  x     / 0.04 ng/mL / x     / x     / x      CARDIAC MARKERS ( 11 Sep 2021 23:13 )  x     / 0.02 ng/mL / x     / x     / x          Serum Pro-Brain Natriuretic Peptide (21 @ 23:13)   Serum Pro-Brain Natriuretic Peptide: 3932:      Urinalysis Basic - ( 11 Sep 2021 23:44 )    Color: Yellow / Appearance: Clear / S.010 / pH: x  Gluc: x / Ketone: Negative  / Bili: Small / Urobili: 4   Blood: x / Protein: 100 / Nitrite: Positive   Leuk Esterase: Small / RBC: 0-2 /HPF / WBC 6-10   Sq Epi: x / Non Sq Epi: Few / Bacteria: Few        Culture:    TTE:    RADIOLOGY    < from: US Abdomen Upper Quadrant Right (21 @ 08:14) >    IMPRESSION:    Mild gallbladder wall thickening, without sonographic evidence of cholelithiasis.        --- End of Report ---            MONICA BURROWS MD; Attending Radiologist  This document has been electronically signed. Sep 12 2021  9:27AM    < end of copied text >  < from: CT Abdomen and Pelvis No Cont (21 @ 01:44) >  IMPRESSION:  Mild motion degraded limited exam.  1. Question mild gallbladder wall thickening. Nonspecific haziness in the region of the laurel hepatis.Correlation with LFTs is recommended. Further evaluation with ultrasound is advised to evaluate for underlying cholecystitis.  2. Pulmonary edema at the lung bases and small right pleural effusion.        --- End of Report ---            NELIA LUA MD; Attending Radiologist    < end of copied text >  < from: Xray Chest 2 Views PA/Lat (21 @ 23:30) >  IMPRESSION: mild congestive heart failure with cardiomegaly, vascular congestion and interstitial edema    --- End of Report ---            BRANDEE SEPULVEDA MD; Attending Radiologist  This document has been electronically signed. Sep 12 2021  7:48PM    < end of copied text >      MEDICATIONS  (STANDING):  aspirin enteric coated 81 milliGRAM(s) Oral daily  carvedilol 12.5 milliGRAM(s) Oral every 12 hours  cefTRIAXone   IVPB 1000 milliGRAM(s) IV Intermittent every 24 hours  cloNIDine 0.1 milliGRAM(s) Oral three times a day  cloNIDine 0.2 milliGRAM(s) Oral three times a day  clopidogrel Tablet 75 milliGRAM(s) Oral daily  ferrous    sulfate 325 milliGRAM(s) Oral daily  furosemide   Injectable 40 milliGRAM(s) IV Push daily  gabapentin 100 milliGRAM(s) Oral three times a day  heparin   Injectable 5000 Unit(s) SubCutaneous every 8 hours  hydrALAZINE 100 milliGRAM(s) Oral three times a day  loratadine 10 milliGRAM(s) Oral daily  memantine 5 milliGRAM(s) Oral daily  NIFEdipine XL 90 milliGRAM(s) Oral daily  simvastatin 20 milliGRAM(s) Oral at bedtime      MEDICATIONS  (PRN):  
                                                    [facility], [facility]                                          [address] , [address], [place] [address]                                               PHONE: [phone]    FAX: [phone] [address], [address], [place] [address]                                       PHONE: [phone]    FAX: [phone]  *******************************************************************************    Overnight events/Subjective Assessment: (via ) breathing improved, no CP, palpitations.  No overnight cardiac events    INTERPRETATION OF TELEMETRY (personally reviewed): SR high 50s-60s, no events    No Known Allergies    MEDICATIONS  (STANDING):  aspirin enteric coated 81 milliGRAM(s) Oral daily  carvedilol 12.5 milliGRAM(s) Oral every 12 hours  cefTRIAXone   IVPB 1000 milliGRAM(s) IV Intermittent every 24 hours  cloNIDine 0.1 milliGRAM(s) Oral three times a day  cloNIDine 0.2 milliGRAM(s) Oral three times a day  clopidogrel Tablet 75 milliGRAM(s) Oral daily  ferrous    sulfate 325 milliGRAM(s) Oral daily  furosemide   Injectable 40 milliGRAM(s) IV Push daily  gabapentin 100 milliGRAM(s) Oral three times a day  heparin   Injectable 5000 Unit(s) SubCutaneous every 8 hours  hydrALAZINE 100 milliGRAM(s) Oral three times a day  loratadine 10 milliGRAM(s) Oral daily  memantine 5 milliGRAM(s) Oral daily  NIFEdipine XL 90 milliGRAM(s) Oral daily  simvastatin 20 milliGRAM(s) Oral at bedtime    MEDICATIONS  (PRN):      Vital Signs Last 24 Hrs  T(C): 36.7 (13 Sep 2021 08:27), Max: 37.1 (12 Sep 2021 15:54)  T(F): 98 (13 Sep 2021 08:27), Max: 98.8 (12 Sep 2021 15:54)  HR: 57 (13 Sep 2021 08:27) (57 - 67)  BP: 181/74 (13 Sep 2021 08:27) (157/83 - 181/74)  BP(mean): --  RR: 18 (13 Sep 2021 08:27) (18 - 18)  SpO2: 96% (13 Sep 2021 08:27) (94% - 96%)    I&O's Detail    I&O's Summary          PHYSICAL EXAM:  General: Appears well developed, well nourished, no acute distress  HEENT: Head: normocephalic, atraumatic  Eyes: Pupils equal and reactive  Neck: Supple, no carotid bruit, no JVD, no HJR  CARDIOVASCULAR: Normal S1 and S2, no murmur, rub, or gallop 1/6 systolic murmur LUSB  CHEST: MCOT device left chest  LUNGS: Clear to auscultation bilaterally, no rales, rhonchi or wheeze  ABDOMEN: Soft, nontender, non-distended, positive bowel sounds, no mass or bruit  EXTREMITIES: No edema (resolved), distal pulses WNL  SKIN: Warm and dry with normal turgor  NEURO: Alert & oriented x 3, grossly intact  PSYCH: appropriate mood and affect        LABS:                        9.0    6.10  )-----------( 249      ( 13 Sep 2021 09:02 )             28.2     09-11    139  |  101  |  60.9<H>  ----------------------------<  250<H>  3.8   |  20.0<L>  |  2.65<H>    Ca    9.0      11 Sep 2021 23:13  Mg     2.6     09-11    TPro  7.2  /  Alb  3.9  /  TBili  0.3<L>  /  DBili  x   /  AST  27  /  ALT  32  /  AlkPhos  136<H>  09-11    CARDIAC MARKERS ( 11 Sep 2021 23:13 )  x     / 0.02 ng/mL / x     / x     / x            Serum Pro-Brain Natriuretic Peptide: 3932 pg/mL (09-11 @ 23:13)  serum  Lipids:         RADIOLOGY & ADDITIONAL STUDIES:  ECHO (8/18/21):  Normal LV fxn (EF 65-70%), diastolic dysfxn, bioAVR, mild paravalvular AR, mild-moderate MR, mild TR, moderate pulmonary HTN    CARDIAC CATHETERIZATION (6/14/21):  Severe aortic stenosis, minor luminal irregularities    CT Abd/Pelvis 9/12/21:  IMPRESSION:  Mild motion degraded limited exam.  1. Question mild gallbladder wall thickening. Nonspecific haziness in the region of the laurel hepatis. Correlation with LFTs is recommended. Further evaluation with ultrasound is advised to evaluate for underlying cholecystitis.  2. Pulmonary edema at the lung bases and small right pleural effusion.    Assessment and Plan:  In summary, PAULINO LAM is a 85F a/w SOB, orthopnea/PND, LE edema & epigastric pain (troponin negative), acute on chronic diastolic CHF exacerbation, + UTI, h/o severe AS s/p bioprosthetic AVR (TAVR) 7/14/21, normal LV fxn, essentially normal coronary arteries with minor luminal irregularities on cardiac catheterization 6/24/21, NSTEMI 8/13/21 2/2 demand ischemia in the setting of uncontrolled HTN, PVD s/p LE stent 8/13/18, carotid disease, CVA 5/20/21, DM, HTN, HL, CRI, anemia    - Monitor on telemetry  - No evidence of ischemia clinically, no chest pain, atypical epigastric pain likely GI in nature, troponin negative, no acute EKG changes.  Cardiac catheterization 6/14/21 demonstrated severe aortic stenosis with no significant CAD and only minor luminal irregularities noted.  Continue medical management.  - Echocardiogram 8/18/21 demonstrated normal LV fxn (EF 65-70%), diastolic dysfxn, bioAVR, mild paravalvular AR, mild-moderate MR, mild TR, moderate pulmonary HTN.  Repeat Echo pending.  - CT abdomen/pelvis 9/12/21 demonstrated 1. Question mild gallbladder wall thickening. Nonspecific haziness in the region of the laurel hepatis. Correlation with LFTs is recommended. Further evaluation with ultrasound is advised to evaluate for underlying cholecystitis. 2. Pulmonary edema at the lung bases and small right pleural effusion.  - Acute on chronic diastolic CHF.  Improvement with IV diuresis.  Continue with Lasix 40 mg IV daily for now, change to PO when Cr starts rising (likely Torsemide).  Monitor repeat CXRs, strict I/Os, daily weights, & BUN/Cr closely and titrate PRN.  Replete K>4 & Mg>2.  Patient was not on diuretic therapy as an outpatient and would benefit from an oral loop diuretic on discharge.  Nephrology follow-up given CRI  - Rhythm/hemodynamics stable = continue current doses of Coreg 12.5 bid, Nifedipine XL 90 daily, Hydralazine 100 tid & Clonidine 0.3 tid for now and titrate PRN  - ASA 81 & Plavix 75 daily in place  - Simvastatin 20 daily in place  - Antibiotics per medicine for UTI        Robin Petty MD 
NEPHROLOGY INTERVAL HPI/OVERNIGHT EVENTS:    Examined  Feeling better  No dyspnea laying flat    MEDICATIONS  (STANDING):  aspirin enteric coated 81 milliGRAM(s) Oral daily  carvedilol 12.5 milliGRAM(s) Oral every 12 hours  cefTRIAXone   IVPB 1000 milliGRAM(s) IV Intermittent every 24 hours  cloNIDine 0.1 milliGRAM(s) Oral three times a day  cloNIDine 0.2 milliGRAM(s) Oral three times a day  clopidogrel Tablet 75 milliGRAM(s) Oral daily  ferrous    sulfate 325 milliGRAM(s) Oral daily  furosemide   Injectable 40 milliGRAM(s) IV Push daily  gabapentin 100 milliGRAM(s) Oral three times a day  heparin   Injectable 5000 Unit(s) SubCutaneous every 8 hours  hydrALAZINE 100 milliGRAM(s) Oral three times a day  loratadine 10 milliGRAM(s) Oral daily  memantine 5 milliGRAM(s) Oral daily  NIFEdipine XL 90 milliGRAM(s) Oral daily  simvastatin 20 milliGRAM(s) Oral at bedtime    MEDICATIONS  (PRN):      Allergies    No Known Allergies    Intolerances        Vital Signs Last 24 Hrs  T(C): 36.7 (13 Sep 2021 08:27), Max: 37.1 (12 Sep 2021 15:54)  T(F): 98 (13 Sep 2021 08:27), Max: 98.8 (12 Sep 2021 15:54)  HR: 57 (13 Sep 2021 08:27) (57 - 67)  BP: 181/74 (13 Sep 2021 08:27) (157/83 - 181/74)  BP(mean): --  RR: 18 (13 Sep 2021 08:27) (18 - 18)  SpO2: 96% (13 Sep 2021 08:27) (94% - 96%)  Daily     Daily     PHYSICAL EXAM:    GENERAL: NAD  HEAD: NCAT  EYES: EOMI  NECK: Supple  NERVOUS SYSTEM:  Alert & Oriented X3  CHEST/LUNG: Clear to percussion bilaterally  HEART: Regular rate and rhythm  ABDOMEN: Soft, Nontender, Nondistended; +BS  EXTREMITIES:  trace LE edema    LABS:                        9.0    6.10  )-----------( 249      ( 13 Sep 2021 09:02 )             28.2     09-13    140  |  98  |  53.4<H>  ----------------------------<  240<H>  3.3<L>   |  25.0  |  2.54<H>    Ca    9.3      13 Sep 2021 09:01  Phos  3.7       Mg     2.6         TPro  7.2  /  Alb  3.9  /  TBili  0.3<L>  /  DBili  x   /  AST  27  /  ALT  32  /  AlkPhos  136<H>        Urinalysis Basic - ( 11 Sep 2021 23:44 )    Color: Yellow / Appearance: Clear / S.010 / pH: x  Gluc: x / Ketone: Negative  / Bili: Small / Urobili: 4   Blood: x / Protein: 100 / Nitrite: Positive   Leuk Esterase: Small / RBC: 0-2 /HPF / WBC 6-10   Sq Epi: x / Non Sq Epi: Few / Bacteria: Few      Phosphorus Level, Serum: 3.7 mg/dL ( @ 09:01)          RADIOLOGY & ADDITIONAL TESTS:  
                                                              [facility], [facility]                                                    [address], [address], [place] [address]                                                         PHONE: [phone]    FAX: [phone] [address], [address], [place] [address]                                                 PHONE: [phone]    FAX: [phone]  *******************************************************************************  cc:    HPI:PAULINO LAM is a 85F a/w SOB, orthopnea / PND, LE edema & epigastric pain (troponin negative), acute on chronic diastolic CHF exacerbation, + UTI, h/o severe AS s/p bioprosthetic AVR (TAVR) 7/14/21, normal LV fxn, essentially normal coronary arteries with minor luminal irregularities on cardiac catheterization 6/24/21, NSTEMI 8/13/21 2/2 demand ischemia in the setting of uncontrolled HTN, PVD s/p LE stent 8/13/18, carotid disease, CVA 5/20/21, DM, HTN, HL, CRI, anemia      Overnight events/Subjective Assessment: no new complaints. Breathing improved. Denies CP. no palpitations, Laying flat. No tachypnea    INTERPRETATION OF TELEMETRY (personally reviewed): SR    PAST MEDICAL & SURGICAL HISTORY:  HTN (hypertension)    High cholesterol    DM (diabetes mellitus)    Cardiomegaly    PAD (peripheral artery disease)    H/O cataract        No Known Allergies      MEDICATIONS  (STANDING):  aspirin enteric coated 81 milliGRAM(s) Oral daily  carvedilol 12.5 milliGRAM(s) Oral every 12 hours  cefTRIAXone   IVPB 1000 milliGRAM(s) IV Intermittent every 24 hours  cloNIDine 0.1 milliGRAM(s) Oral three times a day  cloNIDine 0.2 milliGRAM(s) Oral three times a day  clopidogrel Tablet 75 milliGRAM(s) Oral daily  dextrose 40% Gel 15 Gram(s) Oral once  dextrose 5%. 1000 milliLiter(s) (50 mL/Hr) IV Continuous <Continuous>  dextrose 5%. 1000 milliLiter(s) (100 mL/Hr) IV Continuous <Continuous>  dextrose 50% Injectable 25 Gram(s) IV Push once  dextrose 50% Injectable 12.5 Gram(s) IV Push once  dextrose 50% Injectable 25 Gram(s) IV Push once  doxazosin 1 milliGRAM(s) Oral at bedtime  ferrous    sulfate 325 milliGRAM(s) Oral daily  furosemide   Injectable 40 milliGRAM(s) IV Push daily  gabapentin 100 milliGRAM(s) Oral three times a day  glucagon  Injectable 1 milliGRAM(s) IntraMuscular once  heparin   Injectable 5000 Unit(s) SubCutaneous every 8 hours  hydrALAZINE 100 milliGRAM(s) Oral three times a day  insulin glargine Injectable (LANTUS) 15 Unit(s) SubCutaneous at bedtime  insulin lispro (ADMELOG) corrective regimen sliding scale   SubCutaneous three times a day before meals  insulin lispro (ADMELOG) corrective regimen sliding scale   SubCutaneous at bedtime  insulin lispro Injectable (ADMELOG) 5 Unit(s) SubCutaneous three times a day before meals  loratadine 10 milliGRAM(s) Oral daily  memantine 5 milliGRAM(s) Oral daily  NIFEdipine XL 90 milliGRAM(s) Oral daily  potassium chloride    Tablet ER 20 milliEquivalent(s) Oral daily  simvastatin 20 milliGRAM(s) Oral at bedtime    MEDICATIONS  (PRN):      Vital Signs Last 24 Hrs  T(C): 37.1 (14 Sep 2021 08:06), Max: 37.1 (14 Sep 2021 08:06)  T(F): 98.8 (14 Sep 2021 08:06), Max: 98.8 (14 Sep 2021 08:06)  HR: 60 (14 Sep 2021 08:06) (57 - 63)  BP: 167/- (14 Sep 2021 08:06) (166/64 - 189/72)  BP(mean): --  RR: 18 (14 Sep 2021 08:06) (18 - 18)  SpO2: 96% (14 Sep 2021 08:06) (95% - 97%)    I&O's Detail    14 Sep 2021 07:01  -  14 Sep 2021 08:41  --------------------------------------------------------  IN:    Oral Fluid: 120 mL  Total IN: 120 mL    OUT:  Total OUT: 0 mL    Total NET: 120 mL        I&O's Summary    14 Sep 2021 07:01  -  14 Sep 2021 08:41  --------------------------------------------------------  IN: 120 mL / OUT: 0 mL / NET: 120 mL            PHYSICAL EXAM:  General: Appears well developed, well nourished, no acute distress. not in acute pain  HEAD: normal cephalic. Atraumatic  PUPILS: equal and reactive to light  EARS: normal hearing  NECK: supple. no JVD or HJR. no carotid bruits. no visible lymphadenopathy  NOSE: no gross abnormalities  CHEST: symmetric chest wall expansion  CARDIOVASCULAR: Normal rate. Regular rhythm. Normal S1 and S2, no S3/S4,  no murmur, rub, or gallop  LUNGS: Normal effort. Normal respiratory rate. Breath sounds are clear to auscultation bilaterally. No respiratory distress. No stridor.  no rales, rhonchi or wheeze. no decreased Breath sounds  ABDOMEN: Soft, nontender, non-distended, positive bowel sounds, no mass or bruit. no abdominal tenderness. No rebound. no ascites  EXTREMITIES: No clubbing, cyanosis or edema. normal range of motion  PULSES:  distal pulses WNL  SKIN: Warm and dry with normal turgor. no visible rash or cyanosis   NEURO: Alert & oriented x 3, grossly intact with no focal weakness  PSYCH: normal mood and affect. Grossly normal insight and judgement exhibited    FAMILY HISTORY:  No pertinent family history in first degree relatives        SOCIAL HISTORY:   active smoking. No ETOH/No IVDA    REVIEW OF SYSTEMS:  All pertinent positive and negative ROS as above. All other ROS are negative.        LABS:                        9.0    6.10  )-----------( 249      ( 13 Sep 2021 09:02 )             28.2     09-14    142  |  100  |  57.3<H>  ----------------------------<  134<H>  3.7   |  26.0  |  2.98<H>    Ca    9.0      14 Sep 2021 03:03  Phos  3.7     09-13      CARDIAC MARKERS ( 13 Sep 2021 09:01 )  x     / 0.04 ng/mL / x     / x     / x            Serum Pro-Brain Natriuretic Peptide: 2842 pg/mL (09-14 @ 03:03)  Serum Pro-Brain Natriuretic Peptide: 3932 pg/mL (09-11 @ 23:13)  serum  Lipids:     Thyroid Stimulating Hormone, Serum: 4.16 uIU/mL (09-13 @ 09:01)      RADIOLOGY & ADDITIONAL STUDIES:      ECHO: (8/18/21):  Normal LV fxn (EF 65-70%), diastolic dysfxn, bioAVR, mild paravalvular AR, mild-moderate MR, mild TR, moderate pulmonary HTN    CARDIAC CATHETERIZATION (6/14/21):  Severe aortic stenosis, minor luminal irregularities    CT Abd/Pelvis 9/12/21:  IMPRESSION:  Mild motion degraded limited exam.  1. Question mild gallbladder wall thickening. Nonspecific haziness in the region of the laurel hepatis. Correlation with LFTs is recommended. Further evaluation with ultrasound is advised to evaluate for underlying cholecystitis.  2. Pulmonary edema at the lung bases and small right pleural effusion.    ASSESSMENT AND PLAN:  In summary, PAULINO LAM is a 85y Female with past medical history significant for  SOB, orthopnea/PND, LE edema & epigastric pain (troponin negative), acute on chronic diastolic CHF exacerbation, + UTI, h/o severe AS s/p bioprosthetic AVR (TAVR) 7/14/21, normal LV fxn, essentially normal coronary arteries with minor luminal irregularities on cardiac catheterization 6/24/21, NSTEMI 8/13/21 2/2 demand ischemia in the setting of uncontrolled HTN, PVD s/p LE stent 8/13/18, carotid disease, CVA 5/20/21, DM, HTN, HL, CRI, anemia    - Monitor on telemetry  - No evidence of ischemia clinically, no chest pain, atypical epigastric pain likely GI in nature, troponin negative, no acute EKG changes.  Cardiac catheterization 6/14/21 demonstrated severe aortic stenosis with no significant CAD and only minor luminal irregularities noted.  Continue medical management.  - Acute on chronic diastolic CHF. improved. on IV lasix. Can change to po  - bio AVR. SBE prophylaxis for procedures that entail bacteremia  - Echocardiogram 8/18/21 demonstrated normal LV fxn (EF 65-70%), diastolic dysfxn, bioAVR, mild paravalvular AR, mild-moderate MR, mild TR, moderate pulmonary HTN.  Repeat Echo pending.  - CT abdomen/pelvis 9/12/21 demonstrated 1. Question mild gallbladder wall thickening. Nonspecific haziness in the region of the laurel hepatis. Correlation with LFTs is recommended. Further evaluation with ultrasound is advised to evaluate for underlying cholecystitis. 2. Pulmonary edema at the lung bases and small right pleural effusion.  - Acute on chronic diastolic CHF.  Improvement with IV diuresis.  Continue with Lasix 40 mg IV daily for now, change to PO when Cr starts rising (likely Torsemide).  Monitor repeat CXRs, strict I/Os, daily weights, & BUN/Cr closely and titrate PRN.  Replete K>4 & Mg>2.  Patient was not on diuretic therapy as an outpatient and would benefit from an oral loop diuretic on discharge.  Nephrology follow-up given CRI  - Rhythm/hemodynamics stable = continue current doses of Coreg 12.5 bid, Nifedipine XL 90 daily, Hydralazine 100 tid & Clonidine 0.3 tid for now and titrate PRN  - ASA 81 & Plavix 75 daily in place  - HL. Simvastatin 20 daily in place  - Antibiotics per medicine for UTI  - Telemetry monitoring personally reviewed by me  - radiologic imaging reviewed  - Laboratory data reviewed.  - I have personally reviewed all obtainable prior records and data    Thank you for allowing me to participated in the care of your patient.  Lucinda Krueger MD
  HOSPITALIST PROGRESS NOTE    PAULINO LAM  [id number]  85yFemale    Patient is a 85y old  Female who presents with a chief complaint of Acute CHF exacerbation  TAVR  UTI (14 Sep 2021 14:47)      SUBJECTIVE:   [place]    Chart reviewed since last visit.  Patient seen and examined at bedside for CHF, UTI, uncontrolled HTN  Denies any dyspnea per se but still complains of feeling 'suffocated'  Denies any chest pain, palpitations, fever, chills or cough      OBJECTIVE:  Vital Signs Last 24 Hrs  T(C): 36.5 (14 Sep 2021 15:33), Max: 37.1 (14 Sep 2021 08:06)  T(F): 97.7 (14 Sep 2021 15:33), Max: 98.8 (14 Sep 2021 08:06)  HR: 56 (14 Sep 2021 15:33) (56 - 60)  BP: 162/61 (14 Sep 2021 15:33) (162/61 - 189/72)   RR: 18 (14 Sep 2021 15:33) (18 - 18)  SpO2: 95% (14 Sep 2021 15:33) (95% - 97%)    PHYSICAL EXAMINATION  General: Elderly female lying in bed, NAD  HEENT:  No supplemental O2  NECK:  supple  CVS: regular rate and rhythm S1 S2  RESP:  bilateral fine crackles  lower half lung fields  GI:  Soft nondistended nontender BS+  : No suprapubic tenderness  MSK:  No edema. FROM  CNS:  AAOX3. Moves all extremities, sensation grossly intact  INTEG:  warm dry skin  PSYCH:  Fair mood    MONITOR:  CAPILLARY BLOOD GLUCOSE      POCT Blood Glucose.: 169 mg/dL (14 Sep 2021 16:48)  POCT Blood Glucose.: 100 mg/dL (14 Sep 2021 11:54)  POCT Blood Glucose.: 143 mg/dL (14 Sep 2021 07:50)  POCT Blood Glucose.: 217 mg/dL (13 Sep 2021 21:04)        I&O's Summary    14 Sep 2021 07:01  -  14 Sep 2021 17:04  --------------------------------------------------------  IN: 410 mL / OUT: 0 mL / NET: 410 mL                            9.0    6.10  )-----------( 249      ( 13 Sep 2021 09:02 )             28.2       09-14    142  |  100  |  57.3<H>  ----------------------------<  134<H>  3.7   |  26.0  |  2.98<H>    Ca    9.0      14 Sep 2021 03:03  Phos  3.7     09-13      CARDIAC MARKERS ( 13 Sep 2021 09:01 )  x     / 0.04 ng/mL / x     / x     / x              Culture:    TTE:    RADIOLOGY        MEDICATIONS  (STANDING):  aspirin enteric coated 81 milliGRAM(s) Oral daily  carvedilol 12.5 milliGRAM(s) Oral every 12 hours  cefTRIAXone   IVPB 1000 milliGRAM(s) IV Intermittent every 24 hours  cloNIDine 0.1 milliGRAM(s) Oral three times a day  cloNIDine 0.2 milliGRAM(s) Oral three times a day  clopidogrel Tablet 75 milliGRAM(s) Oral daily  dextrose 40% Gel 15 Gram(s) Oral once  dextrose 5%. 1000 milliLiter(s) (50 mL/Hr) IV Continuous <Continuous>  dextrose 5%. 1000 milliLiter(s) (100 mL/Hr) IV Continuous <Continuous>  dextrose 50% Injectable 25 Gram(s) IV Push once  dextrose 50% Injectable 12.5 Gram(s) IV Push once  dextrose 50% Injectable 25 Gram(s) IV Push once  ferrous    sulfate 325 milliGRAM(s) Oral daily  furosemide    Tablet 40 milliGRAM(s) Oral daily  gabapentin 100 milliGRAM(s) Oral three times a day  glucagon  Injectable 1 milliGRAM(s) IntraMuscular once  heparin   Injectable 5000 Unit(s) SubCutaneous every 8 hours  hydrALAZINE 100 milliGRAM(s) Oral three times a day  insulin glargine Injectable (LANTUS) 15 Unit(s) SubCutaneous at bedtime  insulin lispro (ADMELOG) corrective regimen sliding scale   SubCutaneous three times a day before meals  insulin lispro (ADMELOG) corrective regimen sliding scale   SubCutaneous at bedtime  insulin lispro Injectable (ADMELOG) 5 Unit(s) SubCutaneous three times a day before meals  isosorbide   mononitrate ER Tablet (IMDUR) 30 milliGRAM(s) Oral daily  loratadine 10 milliGRAM(s) Oral daily  memantine 5 milliGRAM(s) Oral daily  NIFEdipine XL 90 milliGRAM(s) Oral daily  potassium chloride    Tablet ER 20 milliEquivalent(s) Oral daily  simvastatin 20 milliGRAM(s) Oral at bedtime      MEDICATIONS  (PRN):  
NEPHROLOGY INTERVAL HPI/OVERNIGHT EVENTS:    Examined   No distress  Feeling better  No dyspnea    MEDICATIONS  (STANDING):  aspirin enteric coated 81 milliGRAM(s) Oral daily  carvedilol 12.5 milliGRAM(s) Oral every 12 hours  cefTRIAXone   IVPB 1000 milliGRAM(s) IV Intermittent every 24 hours  cloNIDine 0.1 milliGRAM(s) Oral three times a day  cloNIDine 0.2 milliGRAM(s) Oral three times a day  clopidogrel Tablet 75 milliGRAM(s) Oral daily  dextrose 40% Gel 15 Gram(s) Oral once  dextrose 5%. 1000 milliLiter(s) (50 mL/Hr) IV Continuous <Continuous>  dextrose 5%. 1000 milliLiter(s) (100 mL/Hr) IV Continuous <Continuous>  dextrose 50% Injectable 25 Gram(s) IV Push once  dextrose 50% Injectable 12.5 Gram(s) IV Push once  dextrose 50% Injectable 25 Gram(s) IV Push once  ferrous    sulfate 325 milliGRAM(s) Oral daily  furosemide    Tablet 40 milliGRAM(s) Oral daily  gabapentin 100 milliGRAM(s) Oral three times a day  glucagon  Injectable 1 milliGRAM(s) IntraMuscular once  heparin   Injectable 5000 Unit(s) SubCutaneous every 8 hours  hydrALAZINE 100 milliGRAM(s) Oral three times a day  insulin glargine Injectable (LANTUS) 15 Unit(s) SubCutaneous at bedtime  insulin lispro (ADMELOG) corrective regimen sliding scale   SubCutaneous three times a day before meals  insulin lispro (ADMELOG) corrective regimen sliding scale   SubCutaneous at bedtime  insulin lispro Injectable (ADMELOG) 5 Unit(s) SubCutaneous three times a day before meals  isosorbide   mononitrate ER Tablet (IMDUR) 30 milliGRAM(s) Oral daily  loratadine 10 milliGRAM(s) Oral daily  memantine 5 milliGRAM(s) Oral daily  NIFEdipine XL 90 milliGRAM(s) Oral daily  potassium chloride    Tablet ER 20 milliEquivalent(s) Oral daily  simvastatin 20 milliGRAM(s) Oral at bedtime    MEDICATIONS  (PRN):      Allergies    No Known Allergies    Intolerances        Vital Signs Last 24 Hrs  T(C): 36.3 (14 Sep 2021 11:50), Max: 37.1 (14 Sep 2021 08:06)  T(F): 97.4 (14 Sep 2021 11:50), Max: 98.8 (14 Sep 2021 08:06)  HR: 56 (14 Sep 2021 11:50) (56 - 60)  BP: 174/63 (14 Sep 2021 11:50) (167/76 - 189/72)  BP(mean): --  RR: 18 (14 Sep 2021 11:50) (18 - 18)  SpO2: 95% (14 Sep 2021 11:50) (95% - 97%)  Daily     Daily     PHYSICAL EXAM:    GENERAL: NAD, well-groomed, well-developed  HEAD:  Atraumatic, Normocephalic  EYES: EOMI, PERRLA, conjunctiva and sclera clear  ENMT: No tonsillar erythema, exudates, or enlargement; Moist mucous membranes, Good dentition, No lesions  NECK: Supple, No JVD, Normal thyroid  NERVOUS SYSTEM:  Alert & Oriented X3, Good concentration; Motor Strength 5/5 B/L upper and lower extremities; DTRs 2+ intact and symmetric  CHEST/LUNG: Clear to percussion bilaterally; No rales, rhonchi, wheezing, or rubs  HEART: Regular rate and rhythm; No murmurs, rubs, or gallops  ABDOMEN: Soft, Nontender, Nondistended; Bowel sounds present  EXTREMITIES:  2+ Peripheral Pulses, No clubbing, cyanosis, or edema  SKIN: No rashes or lesions    LABS:                        9.0    6.10  )-----------( 249      ( 13 Sep 2021 09:02 )             28.2     09-14    142  |  100  |  57.3<H>  ----------------------------<  134<H>  3.7   |  26.0  |  2.98<H>    Ca    9.0      14 Sep 2021 03:03  Phos  3.7     09-13                  RADIOLOGY & ADDITIONAL TESTS:  
NEPHROLOGY INTERVAL HPI/OVERNIGHT EVENTS:  pt comfortable  no acute distress noted    MEDICATIONS  (STANDING):  aspirin enteric coated 81 milliGRAM(s) Oral daily  carvedilol 12.5 milliGRAM(s) Oral every 12 hours  cefTRIAXone   IVPB 1000 milliGRAM(s) IV Intermittent every 24 hours  cloNIDine 0.1 milliGRAM(s) Oral three times a day  cloNIDine 0.2 milliGRAM(s) Oral three times a day  clopidogrel Tablet 75 milliGRAM(s) Oral daily  dextrose 40% Gel 15 Gram(s) Oral once  dextrose 5%. 1000 milliLiter(s) (50 mL/Hr) IV Continuous <Continuous>  dextrose 5%. 1000 milliLiter(s) (100 mL/Hr) IV Continuous <Continuous>  dextrose 50% Injectable 25 Gram(s) IV Push once  dextrose 50% Injectable 12.5 Gram(s) IV Push once  dextrose 50% Injectable 25 Gram(s) IV Push once  ferrous    sulfate 325 milliGRAM(s) Oral daily  furosemide    Tablet 40 milliGRAM(s) Oral daily  gabapentin 100 milliGRAM(s) Oral three times a day  glucagon  Injectable 1 milliGRAM(s) IntraMuscular once  heparin   Injectable 5000 Unit(s) SubCutaneous every 8 hours  hydrALAZINE 100 milliGRAM(s) Oral three times a day  insulin lispro (ADMELOG) corrective regimen sliding scale   SubCutaneous three times a day before meals  insulin lispro (ADMELOG) corrective regimen sliding scale   SubCutaneous at bedtime  isosorbide   mononitrate ER Tablet (IMDUR) 60 milliGRAM(s) Oral daily  loratadine 10 milliGRAM(s) Oral daily  memantine 5 milliGRAM(s) Oral daily  NIFEdipine XL 90 milliGRAM(s) Oral daily  potassium chloride    Tablet ER 20 milliEquivalent(s) Oral daily  senna 2 Tablet(s) Oral at bedtime  simvastatin 20 milliGRAM(s) Oral at bedtime    MEDICATIONS  (PRN):  hydrALAZINE Injectable 5 milliGRAM(s) IV Push every 4 hours PRN for sbp above 170 mmhg      Allergies    No Known Allergies          Vital Signs Last 24 Hrs  T(C): 36.3 (15 Sep 2021 09:07), Max: 37.6 (14 Sep 2021 21:32)  T(F): 97.4 (15 Sep 2021 09:07), Max: 99.7 (14 Sep 2021 21:32)  HR: 57 (15 Sep 2021 11:41) (53 - 59)  BP: 163/73 (15 Sep 2021 11:41) (162/61 - 196/75)  BP(mean): --  RR: 18 (15 Sep 2021 09:07) (18 - 18)  SpO2: 95% (15 Sep 2021 09:07) (94% - 97%)    PHYSICAL EXAM:  GENERAL: NAD, well-groomed, well-developed  ENMT: Moist mucous membranes, Good dentition, No lesions  NECK: Supple, No JVD  NERVOUS SYSTEM:  Awake, alert  CHEST/LUNG: Clear bilaterally  HEART: Regular rate and rhythm; No rub  ABDOMEN: Soft, Nontender, +BSt  EXTREMITIES:  2+ Peripheral Pulses, No LE edema  SKIN: No rashes or lesions    LABS:    09-15    142  |  99  |  55.5<H>  ----------------------------<  81  3.3<L>   |  28.0  |  2.58<H>        Ca    9.8      15 Sep 2021 08:15              RADIOLOGY & ADDITIONAL TESTS:  < from: CT Abdomen and Pelvis No Cont (09.12.21 @ 01:44) >     EXAM:  CT ABDOMEN AND PELVIS                          PROCEDURE DATE:  09/12/2021          INTERPRETATION:  CLINICAL INFORMATION: Left upper quadrant abdominal pain.    COMPARISON: 5/20/2021    CONTRAST/COMPLICATIONS:  IV Contrast: NONE  Oral Contrast: NONE  Complications: None reported at time of study completion    PROCEDURE:  CT of the Abdomen and Pelvis was performed.  Sagittal and coronal reformats were performed.    FINDINGS: Evaluation of solid organs and vascular structures is limited without intravenous contrast. Exam is degraded by motion artifact.    LOWER CHEST: Interlobular septal thickening at the lung bases suggestive of edema. Small right pleural effusion. Scattered mild linear atelectasis. Cardiomegaly. No pericardial effusion. Aortic valve replacement.    LIVER: Within normal limits.  BILE DUCTS: Normal caliber.  GALLBLADDER: Question mild gallbladder wall thickening. Mesenteric haziness in the laurel hepatis extending into the region of the falciform ligament. No radiopaque gallstones.  SPLEEN: Within normal limits.  PANCREAS: Within normal limits.  ADRENALS: Within normal limits.  KIDNEYS/URETERS: Left renal atrophy. No hydronephrosis or urinary tract stone.    BLADDER: Within normal limits.  REPRODUCTIVE ORGANS:Uterus and adnexa within normal limits.    BOWEL: No bowel obstruction. Colonic diverticulosis. Appendix is normal.  PERITONEUM: No ascites.  VESSELS: Atherosclerotic changes.  RETROPERITONEUM/LYMPH NODES: No lymphadenopathy.  ABDOMINAL WALL: Small fat-containing periumbilical hernia. Right inguinal surgical clips.  BONES: Degenerative changes.    IMPRESSION:  Mild motion degraded limited exam.  1. Question mild gallbladder wall thickening. Nonspecific haziness in the region of the laurel hepatis.Correlation with LFTs is recommended. Further evaluation with ultrasound is advised to evaluate for underlying cholecystitis.  2. Pulmonary edema at the lung bases and small right pleural effusion.    < end of copied text >  
NEPHROLOGY INTERVAL HPI/OVERNIGHT EVENTS:  pt resting when seen earlier    MEDICATIONS  (STANDING):  aspirin enteric coated 81 milliGRAM(s) Oral daily  carvedilol 12.5 milliGRAM(s) Oral every 12 hours  cefTRIAXone   IVPB 1000 milliGRAM(s) IV Intermittent every 24 hours  cloNIDine 0.1 milliGRAM(s) Oral three times a day  cloNIDine 0.2 milliGRAM(s) Oral three times a day  clopidogrel Tablet 75 milliGRAM(s) Oral daily  dextrose 40% Gel 15 Gram(s) Oral once  dextrose 5%. 1000 milliLiter(s) (50 mL/Hr) IV Continuous <Continuous>  dextrose 5%. 1000 milliLiter(s) (100 mL/Hr) IV Continuous <Continuous>  dextrose 50% Injectable 25 Gram(s) IV Push once  dextrose 50% Injectable 12.5 Gram(s) IV Push once  dextrose 50% Injectable 25 Gram(s) IV Push once  ferrous    sulfate 325 milliGRAM(s) Oral daily  furosemide    Tablet 40 milliGRAM(s) Oral daily  gabapentin 100 milliGRAM(s) Oral three times a day  glucagon  Injectable 1 milliGRAM(s) IntraMuscular once  heparin   Injectable 5000 Unit(s) SubCutaneous every 8 hours  hydrALAZINE 100 milliGRAM(s) Oral three times a day  insulin lispro (ADMELOG) corrective regimen sliding scale   SubCutaneous three times a day before meals  insulin lispro (ADMELOG) corrective regimen sliding scale   SubCutaneous at bedtime  isosorbide   mononitrate ER Tablet (IMDUR) 120 milliGRAM(s) Oral daily  loratadine 10 milliGRAM(s) Oral daily  magnesium citrate Oral Solution 1 Bottle Oral once  memantine 5 milliGRAM(s) Oral daily  NIFEdipine XL 90 milliGRAM(s) Oral daily  polyethylene glycol 3350 17 Gram(s) Oral daily  potassium chloride    Tablet ER 20 milliEquivalent(s) Oral daily  senna 2 Tablet(s) Oral at bedtime  simvastatin 20 milliGRAM(s) Oral at bedtime    MEDICATIONS  (PRN):  hydrALAZINE Injectable 5 milliGRAM(s) IV Push every 4 hours PRN for sbp above 170 mmhg      Allergies    No Known Allergies          Vital Signs Last 24 Hrs  T(C): 36.6 (16 Sep 2021 06:08), Max: 36.6 (15 Sep 2021 23:00)  T(F): 97.8 (16 Sep 2021 06:08), Max: 97.9 (15 Sep 2021 23:00)  HR: 63 (16 Sep 2021 09:03) (56 - 63)  BP: 160/70 (16 Sep 2021 09:03) (138/62 - 198/68)  BP(mean): --  RR: 16 (16 Sep 2021 08:25) (16 - 18)  SpO2: 96% (16 Sep 2021 08:25) (95% - 97%)    PHYSICAL EXAM:  GENERAL: Comfortable  ENMT: Moist mucous membranes  NECK: Supple, No JVD  NERVOUS SYSTEM:  Awake, alert  CHEST/LUNG: Clear bilaterally  HEART: Regular rate and rhythm; No rub  ABDOMEN: Soft, Nontender, +BSt  EXTREMITIES:  2+ Peripheral Pulses, No LE edema  SKIN: No rashes or lesions    LABS:    09-16    137  |  96<L>  |  59.8<H>  ----------------------------<  208<H>  3.9   |  26.0  |  2.64<H>    Ca    9.3      16 Sep 2021 07:23  Mg     2.2     09-16    TPro  6.9  /  Alb  3.8  /  TBili  <0.2<L>  /  DBili  x   /  AST  14  /  ALT  14  /  AlkPhos  93  09-16        Magnesium, Serum: 2.2 mg/dL (09-16 @ 07:23)      RADIOLOGY & ADDITIONAL TESTS:  
OVERNIGHT EVENTS: doing better. no acute issues. afebrile. + constipation     Present Symptoms:     Dyspnea: none   Nausea/Vomiting: No  Anxiety:  No  Depression: No  Fatigue: no   Loss of appetite: No  Constipation: yes     Pain: none currently             Character-            Duration-            Effect-            Factors-            Frequency-            Location-            Severity-    Pain AD Score:  [email or web address] (press ctrl + left click to view)    Review of Systems: Reviewed                     Negative: no chest pain           All others negative    MEDICATIONS  (STANDING):  aspirin enteric coated 81 milliGRAM(s) Oral daily  carvedilol 12.5 milliGRAM(s) Oral every 12 hours  cefTRIAXone   IVPB 1000 milliGRAM(s) IV Intermittent every 24 hours  cloNIDine 0.1 milliGRAM(s) Oral three times a day  cloNIDine 0.2 milliGRAM(s) Oral three times a day  clopidogrel Tablet 75 milliGRAM(s) Oral daily  dextrose 40% Gel 15 Gram(s) Oral once  dextrose 5%. 1000 milliLiter(s) (50 mL/Hr) IV Continuous <Continuous>  dextrose 5%. 1000 milliLiter(s) (100 mL/Hr) IV Continuous <Continuous>  dextrose 50% Injectable 25 Gram(s) IV Push once  dextrose 50% Injectable 12.5 Gram(s) IV Push once  dextrose 50% Injectable 25 Gram(s) IV Push once  ferrous    sulfate 325 milliGRAM(s) Oral daily  furosemide    Tablet 40 milliGRAM(s) Oral daily  gabapentin 100 milliGRAM(s) Oral three times a day  glucagon  Injectable 1 milliGRAM(s) IntraMuscular once  heparin   Injectable 5000 Unit(s) SubCutaneous every 8 hours  hydrALAZINE 100 milliGRAM(s) Oral three times a day  insulin lispro (ADMELOG) corrective regimen sliding scale   SubCutaneous three times a day before meals  insulin lispro (ADMELOG) corrective regimen sliding scale   SubCutaneous at bedtime  isosorbide   mononitrate ER Tablet (IMDUR) 60 milliGRAM(s) Oral daily  loratadine 10 milliGRAM(s) Oral daily  memantine 5 milliGRAM(s) Oral daily  NIFEdipine XL 90 milliGRAM(s) Oral daily  polyethylene glycol 3350 17 Gram(s) Oral once  potassium chloride    Tablet ER 20 milliEquivalent(s) Oral daily  senna 2 Tablet(s) Oral at bedtime  simvastatin 20 milliGRAM(s) Oral at bedtime    MEDICATIONS  (PRN):  hydrALAZINE Injectable 5 milliGRAM(s) IV Push every 4 hours PRN for sbp above 170 mmhg      PHYSICAL EXAM:    Vital Signs Last 24 Hrs  T(C): 36.3 (15 Sep 2021 09:07), Max: 37.6 (14 Sep 2021 21:32)  T(F): 97.4 (15 Sep 2021 09:07), Max: 99.7 (14 Sep 2021 21:32)  HR: 55 (15 Sep 2021 09:07) (53 - 59)  BP: 177/68 (15 Sep 2021 09:07) (162/61 - 196/75)  BP(mean): --  RR: 18 (15 Sep 2021 09:07) (18 - 18)  SpO2: 95% (15 Sep 2021 09:07) (94% - 97%)    General: alert and oriented in no acute distress     Karnofsky:  50 %    HEENT: normal     Lungs: comfortable     CV: normal      GI: normal          MSK: normal     Skin: no rash    LABS:  09-15    142  |  99  |  55.5<H>  ----------------------------<  81  3.3<L>   |  28.0  |  2.58<H>    Ca    9.8      15 Sep 2021 08:15    I&O's Summary    14 Sep 2021 07:01  -  15 Sep 2021 07:00  --------------------------------------------------------  IN: 810 mL / OUT: 300 mL / NET: 510 mL    RADIOLOGY & ADDITIONAL STUDIES:    ADVANCE DIRECTIVES/TREATMENT PREFERENCES: Full code 
PAULINO LAM    [id number]    85y      Female    INTERVAL HPI/OVERNIGHT EVENTS: patient being seen for chf. patient seen at bedside with language line . Patient states breathing is improved.     REVIEW OF SYSTEMS:    CONSTITUTIONAL: No fever, weight loss, or fatigue  RESPIRATORY: No cough, wheezing, hemoptysis; No shortness of breath  CARDIOVASCULAR: No chest pain, palpitations  GASTROINTESTINAL: No abdominal or epigastric pain. No nausea, vomiting  NEUROLOGICAL: No headaches, memory loss, loss of strength.  MISCELLANEOUS:      Vital Signs Last 24 Hrs  T(C): 36.3 (15 Sep 2021 09:07), Max: 37.6 (14 Sep 2021 21:32)  T(F): 97.4 (15 Sep 2021 09:07), Max: 99.7 (14 Sep 2021 21:32)  HR: 57 (15 Sep 2021 11:41) (53 - 59)  BP: 163/73 (15 Sep 2021 11:41) (162/61 - 196/75)  BP(mean): --  RR: 18 (15 Sep 2021 09:07) (18 - 18)  SpO2: 95% (15 Sep 2021 09:07) (94% - 97%)    PHYSICAL EXAM:    General: Elderly female lying in bed, NAD  HEENT:  No supplemental O2  CVS: regular rate and rhythm S1 S2  RESP:  bilateral fine crackles  lower half lung fields  GI:  Soft nondistended nontender BS+  : No suprapubic tenderness  MSK:  No edema. FROM  CNS:  AAOX3. Moves all extremities, sensation grossly intact  INTEG:  warm dry skin  PSYCH:  Fair mood      LABS:    09-15    142  |  99  |  55.5<H>  ----------------------------<  81  3.3<L>   |  28.0  |  2.58<H>    Ca    9.8      15 Sep 2021 08:15        MEDICATIONS  (STANDING):  aspirin enteric coated 81 milliGRAM(s) Oral daily  carvedilol 12.5 milliGRAM(s) Oral every 12 hours  cefTRIAXone   IVPB 1000 milliGRAM(s) IV Intermittent every 24 hours  cloNIDine 0.1 milliGRAM(s) Oral three times a day  cloNIDine 0.2 milliGRAM(s) Oral three times a day  clopidogrel Tablet 75 milliGRAM(s) Oral daily  dextrose 40% Gel 15 Gram(s) Oral once  dextrose 5%. 1000 milliLiter(s) (50 mL/Hr) IV Continuous <Continuous>  dextrose 5%. 1000 milliLiter(s) (100 mL/Hr) IV Continuous <Continuous>  dextrose 50% Injectable 25 Gram(s) IV Push once  dextrose 50% Injectable 12.5 Gram(s) IV Push once  dextrose 50% Injectable 25 Gram(s) IV Push once  ferrous    sulfate 325 milliGRAM(s) Oral daily  furosemide    Tablet 40 milliGRAM(s) Oral daily  gabapentin 100 milliGRAM(s) Oral three times a day  glucagon  Injectable 1 milliGRAM(s) IntraMuscular once  heparin   Injectable 5000 Unit(s) SubCutaneous every 8 hours  hydrALAZINE 100 milliGRAM(s) Oral three times a day  insulin lispro (ADMELOG) corrective regimen sliding scale   SubCutaneous three times a day before meals  insulin lispro (ADMELOG) corrective regimen sliding scale   SubCutaneous at bedtime  isosorbide   mononitrate ER Tablet (IMDUR) 60 milliGRAM(s) Oral daily  loratadine 10 milliGRAM(s) Oral daily  memantine 5 milliGRAM(s) Oral daily  NIFEdipine XL 90 milliGRAM(s) Oral daily  potassium chloride    Tablet ER 20 milliEquivalent(s) Oral daily  senna 2 Tablet(s) Oral at bedtime  simvastatin 20 milliGRAM(s) Oral at bedtime    MEDICATIONS  (PRN):  hydrALAZINE Injectable 5 milliGRAM(s) IV Push every 4 hours PRN for sbp above 170 mmhg      RADIOLOGY & ADDITIONAL TESTS:

## 2021-09-16 NOTE — PROGRESS NOTE ADULT - REASON FOR ADMISSION
Acute CHF exacerbation  TAVR  UTI

## 2021-09-16 NOTE — DISCHARGE NOTE NURSING/CASE MANAGEMENT/SOCIAL WORK - PATIENT PORTAL LINK FT
You can access the FollowMyHealth Patient Portal offered by [facility] by registering at the following website: [email or web address]. By joining [employer]’s FollowMyHealth portal, you will also be able to view your health information using other applications (apps) compatible with our system.

## 2021-09-16 NOTE — PROGRESS NOTE ADULT - PROVIDER SPECIALTY LIST ADULT
Cardiology
Nephrology
Cardiology
Hospitalist
Nephrology
Cardiology
Internal Medicine
Nephrology
Nephrology
Palliative Care
Hospitalist

## 2021-09-16 NOTE — DISCHARGE NOTE PROVIDER - CARE PROVIDER_API CALL
Lucinda Krueger)  Cardiovascular Disease; Internal Medicine  [address], [address]  [place] [address]  Phone: [phone]  Fax: [phone]  Follow Up Time:     primary care,   pcp  Phone: (   )    -  Fax: (   )    -  Follow Up Time:

## 2021-09-16 NOTE — DISCHARGE NOTE PROVIDER - NSDCCPCAREPLAN_GEN_ALL_CORE_FT
PRINCIPAL DISCHARGE DIAGNOSIS  Diagnosis: Pulmonary edema  Assessment and Plan of Treatment:       SECONDARY DISCHARGE DIAGNOSES  Diagnosis: Recurrent UTI  Assessment and Plan of Treatment:     Diagnosis: Stage 4 chronic kidney disease  Assessment and Plan of Treatment:     Diagnosis: Diabetes  Assessment and Plan of Treatment:     Diagnosis: CHF, acute  Assessment and Plan of Treatment:      PRINCIPAL DISCHARGE DIAGNOSIS  Diagnosis: Pulmonary edema  Assessment and Plan of Treatment:       SECONDARY DISCHARGE DIAGNOSES  Diagnosis: Recurrent UTI  Assessment and Plan of Treatment:     Diagnosis: Stage 4 chronic kidney disease  Assessment and Plan of Treatment:     Diagnosis: Diabetes  Assessment and Plan of Treatment:     Diagnosis: CHF, acute  Assessment and Plan of Treatment:     Diagnosis: Hypertensive urgency  Assessment and Plan of Treatment:

## 2021-09-16 NOTE — DISCHARGE NOTE PROVIDER - HOSPITAL COURSE
85 year old female with PMH HTN, Dyslipidemia, ckd stage 3-4,. T2DM, CAD, AS s/p TAVR, PVD s/p LE stent, CHFpEF presenting with dyspnea, LE swelling and pain. Elevated BNP and Chest X-Ray consistent with CHF. UA (+)  patient admitted to medicine and seen by cardio and renal in consult.     tte:  Summary:   1. Left ventricular ejection fraction, by visual estimation, is 70 to 75%.   2. Technically adequate study.   3. Normal global left ventricular systolic function.   4. Mildly increased LV wall thickness.   5. Spectral Doppler shows pseudonormal pattern of left ventricular myocardial filling (Grade II diastolic dysfunction).    CHFpEF, acute on chronic diastolic HF - decompensated. improved  - c.w lasix   advised low salt diet    UTI - afebrile without any leukocytosis  - completed Ceftriaxone  - asymptomativ, non toxic appearing     HTN - uncontrolled  - Coreg 12.5 tid, Clonidine 0.3 tid- limited by bradycardia  - Hydralazine 100 q8. Nifedipine 90 daily.  - imdur    T2DM with hyperglycemia. Improved glycemic control. A1c 5.5  - stable   dc without januvia    CAD, PAD  - DAPT, Statin, BB    CKD4 -   - Monitor renal functions. May need to have some degree of azotemia  advised to watch salt intake    Anemia, normocytic  - Supplements  - Monitor Hgb    Hypokalemia - supplement    home with home care    time spent on dc 34 minutes    pe  General: Elderly female lying in bed, NAD  HEENT:  No supplemental O2  CVS: regular rate and rhythm S1 S2  RESP:  bilateral fine crackles  lower half lung fields  GI:  Soft nondistended nontender BS+  : No suprapubic tenderness  MSK:  No edema. FROM  CNS:  AAOX3. Moves all extremities, sensation grossly intact  INTEG:  warm dry skin  PSYCH:  Fair mood 85 year old female with PMH HTN, Dyslipidemia, ckd stage 3-4,. T2DM, CAD, AS s/p TAVR, PVD s/p LE stent, CHFpEF presenting with dyspnea, LE swelling and pain. Elevated BNP and Chest X-Ray consistent with CHF. UA (+)  patient admitted to medicine and seen by cardio and renal in consult.     tte:  Summary:   1. Left ventricular ejection fraction, by visual estimation, is 70 to 75%.   2. Technically adequate study.   3. Normal global left ventricular systolic function.   4. Mildly increased LV wall thickness.   5. Spectral Doppler shows pseudonormal pattern of left ventricular myocardial filling (Grade II diastolic dysfunction).    CHFpEF, acute on chronic diastolic HF - decompensated. improved  - c.w lasix   advised low salt diet    UTI - afebrile without any leukocytosis  - completed Ceftriaxone  - asymptomativ, non toxic appearing     HTN urgency- uncontrolled  - Coreg 12.5 tid, Clonidine 0.3 tid- limited by bradycardia  - Hydralazine 100 q8. Nifedipine 90 daily.  - imdur    T2DM with hyperglycemia. Improved glycemic control. A1c 5.5  - stable   dc without januvia    CAD, PAD  - DAPT, Statin, BB    CKD4 -   - Monitor renal functions. May need to have some degree of azotemia  advised to watch salt intake    Anemia, normocytic  - Supplements  - Monitor Hgb    Hypokalemia - supplement    home with home care    time spent on dc 34 minutes    pe  General: Elderly female lying in bed, NAD  HEENT:  No supplemental O2  CVS: regular rate and rhythm S1 S2  RESP:  bilateral fine crackles  lower half lung fields  GI:  Soft nondistended nontender BS+  : No suprapubic tenderness  MSK:  No edema. FROM  CNS:  AAOX3. Moves all extremities, sensation grossly intact  INTEG:  warm dry skin  PSYCH:  Fair mood

## 2021-09-16 NOTE — DISCHARGE NOTE PROVIDER - NSDCFUSCHEDAPPT_GEN_ALL_CORE_FT
[facility] ; 09/27/2021 ; [facility] [address] ; 10/05/2021 ; [facility] [address] ; 11/30/2021 ; [facility] Endocr[facility] [address]

## 2021-09-16 NOTE — DISCHARGE NOTE PROVIDER - PROVIDER TOKENS
PROVIDER:[TOKEN:[[id number]]],FREE:[LAST:[primary care],PHONE:[(   )    -],FAX:[(   )    -],ADDRESS:[pcp]]

## 2021-09-20 ENCOUNTER — NON-APPOINTMENT (OUTPATIENT)
Age: 86
End: 2021-09-20

## 2021-09-21 RX ORDER — FLASH GLUCOSE SENSOR
KIT MISCELLANEOUS
Qty: 1 | Refills: 0 | Status: DISCONTINUED | COMMUNITY
Start: 2021-06-10 | End: 2021-09-21

## 2021-09-21 RX ORDER — FLASH GLUCOSE SENSOR
KIT MISCELLANEOUS
Qty: 6 | Refills: 0 | Status: DISCONTINUED | COMMUNITY
Start: 2021-06-10 | End: 2021-09-21

## 2021-09-21 RX ORDER — BLOOD SUGAR DIAGNOSTIC
STRIP MISCELLANEOUS TWICE DAILY
Qty: 100 | Refills: 3 | Status: COMPLETED | COMMUNITY
Start: 2020-05-12 | End: 2021-09-21

## 2021-09-23 RX ORDER — HYDRALAZINE HYDROCHLORIDE 100 MG/1
100 TABLET ORAL 3 TIMES DAILY
Qty: 270 | Refills: 0 | Status: ACTIVE | COMMUNITY
Start: 1900-01-01 | End: 1900-01-01

## 2021-09-23 RX ORDER — FLASH GLUCOSE SCANNING READER
EACH MISCELLANEOUS
Qty: 1 | Refills: 0 | Status: ACTIVE | COMMUNITY
Start: 2021-09-21 | End: 1900-01-01

## 2021-09-23 RX ORDER — FLASH GLUCOSE SENSOR
KIT MISCELLANEOUS
Qty: 2 | Refills: 0 | Status: ACTIVE | COMMUNITY
Start: 2021-09-21 | End: 1900-01-01

## 2021-09-24 ENCOUNTER — APPOINTMENT (OUTPATIENT)
Dept: HEMATOLOGY ONCOLOGY | Facility: CLINIC | Age: 86
End: 2021-09-24
Payer: MEDICARE

## 2021-09-24 VITALS
SYSTOLIC BLOOD PRESSURE: 113 MMHG | HEIGHT: 62 IN | WEIGHT: 140 LBS | OXYGEN SATURATION: 98 % | DIASTOLIC BLOOD PRESSURE: 56 MMHG | BODY MASS INDEX: 25.76 KG/M2 | HEART RATE: 48 BPM

## 2021-09-24 DIAGNOSIS — K59.09 OTHER CONSTIPATION: ICD-10-CM

## 2021-09-24 PROCEDURE — 99213 OFFICE O/P EST LOW 20 MIN: CPT

## 2021-09-24 RX ORDER — DOCUSATE SODIUM 100 MG/1
100 CAPSULE ORAL TWICE DAILY
Qty: 60 | Refills: 2 | Status: ACTIVE | COMMUNITY
Start: 2021-09-24 | End: 1900-01-01

## 2021-09-24 RX ORDER — IRON POLYSACCHARIDE COMPLEX 15MG/0.5ML
15 DROPS ORAL DAILY
Qty: 1 | Refills: 0 | Status: ACTIVE | COMMUNITY
Start: 2021-09-24 | End: 1900-01-01

## 2021-09-24 NOTE — HISTORY OF PRESENT ILLNESS
[de-identified] : 9/24/21: Tete is here for follow up. She had an recent [facility] admission early Sept 2021t for hypertensive urgency found to have UTI and NSTEMI.  She was treated with abx and medically management. Since hospital discharge, she feels fatigued. Pt stopped PO iron due to severe constipation after taking it for a month. Labs on 9/22 showed iron 27, % Sat 10. \par \par Pt was advised to take iron and resume Aranesp injection previously. She has not done that.\par \par \par \par  [de-identified] : \par \par Ms. Hastings is an 86 yo presenting for follow up for anemia.\par She has a history of diabetes and renal failure, secondary to diabetes. She follows with Dr. Strickland and . Has been treated with aranesp. \par \par \par

## 2021-09-24 NOTE — ASSESSMENT
[FreeTextEntry1] : 85-year-old woman with diabetes and chronic renal insufficiency, recently discharged from rehab following CVA.\par Mild anemia related to chronic renal insufficiency requires no current hematologic therapy.\par \par # Anemia\par -JACY and also in the setting of chronic renal insufficiency\par -trial of oral liquid iron as she could not tolerate the pills\par -once iron is repleted, will start Aranesp\par \par # constipation\par -trial of Colace \par -script sent\par \par \par RTC in 4 weeks to recheck iron and plan for Aranesp

## 2021-09-24 NOTE — PHYSICAL EXAM
[Ambulatory and capable of all self care but unable to carry out any work activities] : Status 2- Ambulatory and capable of all self care but unable to carry out any work activities. Up and about more than 50% of waking hours [Normal] : affect appropriate [de-identified] : Elderly

## 2021-09-24 NOTE — REVIEW OF SYSTEMS
[Fatigue] : fatigue [Negative] : Allergic/Immunologic [Fever] : no fever [de-identified] : Dementia

## 2021-09-27 ENCOUNTER — APPOINTMENT (OUTPATIENT)
Dept: FAMILY MEDICINE | Facility: CLINIC | Age: 86
End: 2021-09-27
Payer: MEDICARE

## 2021-09-27 VITALS
BODY MASS INDEX: 25.76 KG/M2 | WEIGHT: 140 LBS | DIASTOLIC BLOOD PRESSURE: 82 MMHG | HEIGHT: 62 IN | RESPIRATION RATE: 12 BRPM | SYSTOLIC BLOOD PRESSURE: 130 MMHG | TEMPERATURE: 98.3 F | HEART RATE: 55 BPM | OXYGEN SATURATION: 98 %

## 2021-09-27 DIAGNOSIS — R30.0 DYSURIA: ICD-10-CM

## 2021-09-27 DIAGNOSIS — N18.5 CHRONIC KIDNEY DISEASE, STAGE 5: ICD-10-CM

## 2021-09-27 DIAGNOSIS — E11.21 TYPE 2 DIABETES MELLITUS WITH DIABETIC NEPHROPATHY: ICD-10-CM

## 2021-09-27 DIAGNOSIS — D64.9 ANEMIA, UNSPECIFIED: ICD-10-CM

## 2021-09-27 DIAGNOSIS — Z09 ENCOUNTER FOR FOLLOW-UP EXAMINATION AFTER COMPLETED TREATMENT FOR CONDITIONS OTHER THAN MALIGNANT NEOPLASM: ICD-10-CM

## 2021-09-27 DIAGNOSIS — I50.9 HEART FAILURE, UNSPECIFIED: ICD-10-CM

## 2021-09-27 DIAGNOSIS — I10 ESSENTIAL (PRIMARY) HYPERTENSION: ICD-10-CM

## 2021-09-27 DIAGNOSIS — N39.0 URINARY TRACT INFECTION, SITE NOT SPECIFIED: ICD-10-CM

## 2021-09-27 DIAGNOSIS — E11.40 TYPE 2 DIABETES MELLITUS WITH DIABETIC NEUROPATHY, UNSPECIFIED: ICD-10-CM

## 2021-09-27 DIAGNOSIS — E11.9 TYPE 2 DIABETES MELLITUS W/OUT COMPLICATIONS: ICD-10-CM

## 2021-09-27 PROCEDURE — 99495 TRANSJ CARE MGMT MOD F2F 14D: CPT | Mod: 25

## 2021-09-27 PROCEDURE — 99214 OFFICE O/P EST MOD 30 MIN: CPT | Mod: 25

## 2021-09-27 PROCEDURE — 81003 URINALYSIS AUTO W/O SCOPE: CPT | Mod: QW

## 2021-09-27 RX ORDER — CLONIDINE HYDROCHLORIDE 0.2 MG/1
0.2 TABLET ORAL 3 TIMES DAILY
Qty: 1 | Refills: 3 | Status: DISCONTINUED | COMMUNITY
Start: 2019-06-26 | End: 2021-09-27

## 2021-09-27 RX ORDER — CLONIDINE HYDROCHLORIDE 0.3 MG/1
0.3 TABLET ORAL 3 TIMES DAILY
Qty: 90 | Refills: 5 | Status: ACTIVE | COMMUNITY
Start: 2021-09-27 | End: 1900-01-01

## 2021-09-27 RX ORDER — LIDOCAINE 5% 700 MG/1
5 PATCH TOPICAL
Qty: 1 | Refills: 0 | Status: ACTIVE | COMMUNITY
Start: 2021-09-27 | End: 1900-01-01

## 2021-09-27 RX ORDER — CIPROFLOXACIN HYDROCHLORIDE 250 MG/1
250 TABLET, FILM COATED ORAL
Qty: 6 | Refills: 0 | Status: ACTIVE | COMMUNITY
Start: 2021-09-27 | End: 1900-01-01

## 2021-09-27 RX ORDER — CLOPIDOGREL BISULFATE 75 MG/1
75 TABLET, FILM COATED ORAL
Qty: 90 | Refills: 1 | Status: ACTIVE | COMMUNITY
Start: 2019-06-26 | End: 1900-01-01

## 2021-09-27 NOTE — REVIEW OF SYSTEMS
[Dysuria] : dysuria [Frequency] : frequency [Joint Pain] : joint pain [Dizziness] : dizziness [Negative] : Heme/Lymph [FreeTextEntry7] : poor apettite [de-identified] : burning sensation across top of abdomen across the front

## 2021-09-27 NOTE — HEALTH RISK ASSESSMENT
[Intercurrent hospitalizations] : was admitted to the hospital  [No] : In the past 12 months have you used drugs other than those required for medical reasons? No [No falls in past year] : Patient reported no falls in the past year [0] : 2) Feeling down, depressed, or hopeless: Not at all (0) [PHQ-2 Negative - No further assessment needed] : PHQ-2 Negative - No further assessment needed [With Family] : lives with family [Independent] : feeding [Some assistance needed] : using telephone [Full assistance needed] : managing finances [Reports changes in hearing] : Reports changes in hearing [Reports changes in vision] : Reports changes in vision [] : No [Audit-CScore] : 0 [de-identified] : none [de-identified] : Diabetic [[id number]] : 0 [Change in mental status noted] : No change in mental status noted

## 2021-09-27 NOTE — ASSESSMENT
[FreeTextEntry1] : 84 y.o female with PMHx significant for HTN, HLD, DM2, diabetic neuropathy, PAD s/p Right LE Stent (?), memory loss, dementia - early Alzheimer's, CVA, CKD, Diabetic Nephropathy, diabetic neuropathy, presenting s/p hospitalization for acute on chronic CHF exacerbation, hypertensive urgency, CKD, Anemia of iron deficiency\par \par ENDOCRINE/NEUROLOGY: h/o T2DM, diabetic neuropathy, memory difficulties, advancing early Alzheimer's dementia, small brain infarcts.\par -Last HgA1c 7.0-->9.0 --> 11.1\par -Currently only on Basaglar 14 u as per Endocrine, Januvia discontinued at discharge.\par -Log sheet for blood sugars showed elevated at 200s 1 hr after lunch\par -Recommend fasting BS <130 and to check BS 2 hr after lunch. If bs still elevated recommend to go back to Novolog pre meal SS.\par -Moderate exercise recommended\par -Continue Metanx.\par -Diabetic eye exam May 2021, no retinopathy.\par -Neurology Dr Tolentino, follow up as scheduled.\par -Continue Memantine 5 mg daily\par \par RENAL: CKD Stage 5, Diabetic Nephropathy\par -Stable, last Cr 2.45 -->1.60--> 3.3 on bw by nephrology 5 days ago\par -Renal US with [facility]\par -Nephrology following Dr Bennett\par -Furosemide reinstated on discharge to 40 mg daily\par -Avoid Nephrotoxins\par \par CVS: h/o HTN, HLD, PAD s/p stent to RLE for severe right leg claudication to right tibio-Peroneal and balloon Angioplasty to Right posterior tibial and right anterior tibial, f/w NSTEMI likely type 2 in setting of hypertensive urgency\par -Cardiology - [facility], Dr D'Agate.\par -Stent placed by Dr Anurag Mcgee.\par -Blood pressure at goal today.\par -Continue Carvedilol 12.5 mg bid, Clonidine 0.3 mg tid, Hydralazine 100 mg TID, Simvastatin, Nifedipine XL 90 mg, Clopidogrel (Rx refilled), Furosemide 40 mg daily.\par -Moderate exercise recommended.\par -NST 4/30/19 negative for Ischemia\par -Carotid duplex Mild-Mod 16-49% stenosis of Right bulb and pRICA. Mild to Mod 16-49% stenosis of the left Bulb and pLICA\par -2D Echo (5/20/19) EF 70-75%, normal LVSF, mild to mod AV stenosis.\par -Cardio eval for possible TAVR\par \par GI PPx- Continue Famotidine\par \par HEME: Anemia of chronic disease\par -Stable, currently s/p  Aranesp injections\par -Hematology Dr MILDRED Myers following, will need Iron infusions once her Fe level rises.\par -Trial of oral liquid iron as she cannot tolerate pills.\par \par HCM:\par -Received Prevnar 13 in office Nov 2019\par -PFIZER Covid-19 vaccine X  2 DOSES 3/14/21, 4/14/21.\par -Flu vaccine administered in house 10/20\par -RTO in 1 months for BP, diabetes check, Flu vaccine

## 2021-09-27 NOTE — HISTORY OF PRESENT ILLNESS
[Discharge Summary] : discharge summary [Pertinent Labs] : pertinent labs [Radiology Findings] : radiology findings [Med Reconciliation] : medication reconciliation has been completed [Discharge Med List] : discharge medication list [Post-hospitalization from ___ Hospital] : Post-hospitalization from [unfilled] Hospital [Admitted on: ___] : The patient was admitted on [unfilled] [Discharged on ___] : discharged on [unfilled] [Patient Contacted By: ____] : and contacted by [unfilled] [FreeTextEntry2] : Pt presented to ER with Dyspnea, LE swelling and abdominal pain, f/w elevated BNP, acute on chronic CHF treated with Lasix, TTE showed EF 70-75%, HTN urgency, medications adjusted, CKD with anemia of chronic disease / Iron insufficiency and uncomplicated UTI treated with Rocephin. Pt was discharged to home with home care and follow ups with Nephrology (Dr Bennett), PMD.\par Since discharge, pt's son states that he blood pressure has been very well controlled but her blood sugars have been elevated during the day. Pt seeing endocrinology, was discontinued Januvia at the hospital, left only on Basaglar at bedtime.

## 2021-10-05 ENCOUNTER — APPOINTMENT (OUTPATIENT)
Dept: FAMILY MEDICINE | Facility: CLINIC | Age: 86
End: 2021-10-05

## 2021-10-07 ENCOUNTER — NON-APPOINTMENT (OUTPATIENT)
Age: 86
End: 2021-10-07

## 2021-10-15 ENCOUNTER — OUTPATIENT (OUTPATIENT)
Dept: OUTPATIENT SERVICES | Facility: HOSPITAL | Age: 86
LOS: 1 days | Discharge: HOME | End: 2021-10-15

## 2021-10-15 DIAGNOSIS — D64.9 ANEMIA, UNSPECIFIED: ICD-10-CM

## 2021-10-15 DIAGNOSIS — Z86.69 PERSONAL HISTORY OF OTHER DISEASES OF THE NERVOUS SYSTEM AND SENSE ORGANS: Chronic | ICD-10-CM

## 2021-10-18 ENCOUNTER — APPOINTMENT (OUTPATIENT)
Dept: HEMATOLOGY ONCOLOGY | Facility: CLINIC | Age: 86
End: 2021-10-18

## 2021-11-18 ENCOUNTER — APPOINTMENT (OUTPATIENT)
Dept: FAMILY MEDICINE | Facility: CLINIC | Age: 86
End: 2021-11-18

## 2021-11-30 ENCOUNTER — APPOINTMENT (OUTPATIENT)
Dept: ENDOCRINOLOGY | Facility: CLINIC | Age: 86
End: 2021-11-30

## 2022-01-10 NOTE — ED ADULT NURSE NOTE - BEFAST FACE
Pt was in mask through out encounter. This ERT was in proper PPE through out encounter.      Bessy Morris, PCT  01/10/22 [id number]     No

## 2024-12-19 NOTE — H&P ADULT - NSHPSOCIALHISTORY_GEN_ALL_CORE
Take your medicines as prescribed.  Your lab work did not show evidence of damage to your heart.      Please return to this facility or another ED as needed for any new or worsening symptoms including chest pain, shortness of breath, abdominal pain, nausea or vomiting, fever or chills. Please follow up with your primary care doctor in 3 days. Please take all medicines as prescribed.      No cigarette, alcohol or illicit drug use. Lives with family

## 2025-01-21 NOTE — ED ADULT NURSE NOTE - NSFALLRSKASSESSTYPE_ED_ALL_ED
Please bring in a copy of your advance directive at your next visit, or drop off a copy at any [facility] facility so that it can be added to your medical record.     Initial (On Arrival)